# Patient Record
Sex: MALE | Race: WHITE | Employment: OTHER | URBAN - METROPOLITAN AREA
[De-identification: names, ages, dates, MRNs, and addresses within clinical notes are randomized per-mention and may not be internally consistent; named-entity substitution may affect disease eponyms.]

---

## 2017-02-20 PROBLEM — K64.0 FIRST DEGREE HEMORRHOIDS: Status: ACTIVE | Noted: 2017-02-20

## 2018-02-26 PROBLEM — F32.A MILD DEPRESSION: Status: ACTIVE | Noted: 2018-02-26

## 2018-03-12 PROBLEM — I48.0 PAROXYSMAL ATRIAL FIBRILLATION (HCC): Status: ACTIVE | Noted: 2018-03-12

## 2018-03-14 ENCOUNTER — HOSPITAL ENCOUNTER (OUTPATIENT)
Dept: LAB | Age: 58
Discharge: HOME | End: 2018-03-14
Payer: COMMERCIAL

## 2018-03-14 DIAGNOSIS — I48.0 PAROXYSMAL ATRIAL FIBRILLATION (HCC): ICD-10-CM

## 2018-03-14 LAB
ANION GAP SERPL CALC-SCNC: 8 MMOL/L
BASOPHILS # BLD: 0.1 K/UL (ref 0–0.2)
BASOPHILS NFR BLD: 1 % (ref 0–2)
BUN SERPL-MCNC: 21 MG/DL (ref 6–23)
CALCIUM SERPL-MCNC: 9.1 MG/DL (ref 8.3–10.4)
CHLORIDE SERPL-SCNC: 111 MMOL/L (ref 98–107)
CO2 SERPL-SCNC: 22 MMOL/L (ref 21–32)
CREAT SERPL-MCNC: 0.8 MG/DL (ref 0.8–1.5)
DIFFERENTIAL METHOD BLD: ABNORMAL
EOSINOPHIL # BLD: 0.1 K/UL (ref 0–0.8)
EOSINOPHIL NFR BLD: 1 % (ref 0.5–7.8)
ERYTHROCYTE [DISTWIDTH] IN BLOOD BY AUTOMATED COUNT: 12.4 % (ref 11.9–14.6)
GLUCOSE SERPL-MCNC: 95 MG/DL (ref 65–100)
HCT VFR BLD AUTO: 45.7 % (ref 41.1–50.3)
HGB BLD-MCNC: 16.7 G/DL (ref 13.6–17.2)
INR PPP: 1.1
LYMPHOCYTES # BLD: 1.6 K/UL (ref 0.5–4.6)
LYMPHOCYTES NFR BLD: 24 % (ref 13–44)
MCH RBC QN AUTO: 30.8 PG (ref 26.1–32.9)
MCHC RBC AUTO-ENTMCNC: 36.5 G/DL (ref 31.4–35)
MCV RBC AUTO: 84.2 FL (ref 79.6–97.8)
MONOCYTES # BLD: 0.5 K/UL (ref 0.1–1.3)
MONOCYTES NFR BLD: 7 % (ref 4–12)
NEUTS SEG # BLD: 4.5 K/UL (ref 1.7–8.2)
NEUTS SEG NFR BLD: 67 % (ref 43–78)
PLATELET # BLD AUTO: 207 K/UL (ref 150–450)
PMV BLD AUTO: 9.2 FL (ref 10.8–14.1)
POTASSIUM SERPL-SCNC: 3.7 MMOL/L (ref 3.5–5.1)
PROTHROMBIN TIME: 14.3 SEC (ref 11.5–14.5)
RBC # BLD AUTO: 5.43 M/UL (ref 4.23–5.67)
SODIUM SERPL-SCNC: 141 MMOL/L (ref 136–145)
WBC # BLD AUTO: 6.7 K/UL (ref 4.3–11.1)

## 2018-03-14 PROCEDURE — 36415 COLL VENOUS BLD VENIPUNCTURE: CPT | Performed by: INTERNAL MEDICINE

## 2018-03-14 PROCEDURE — 85610 PROTHROMBIN TIME: CPT | Performed by: INTERNAL MEDICINE

## 2018-03-14 PROCEDURE — 85025 COMPLETE CBC W/AUTO DIFF WBC: CPT | Performed by: INTERNAL MEDICINE

## 2018-03-14 PROCEDURE — 80048 BASIC METABOLIC PNL TOTAL CA: CPT | Performed by: INTERNAL MEDICINE

## 2018-03-15 ENCOUNTER — HOSPITAL ENCOUNTER (OUTPATIENT)
Dept: CARDIAC CATH/INVASIVE PROCEDURES | Age: 58
Discharge: HOME | End: 2018-03-15
Attending: INTERNAL MEDICINE | Admitting: INTERNAL MEDICINE
Payer: COMMERCIAL

## 2018-03-15 VITALS
BODY MASS INDEX: 24.59 KG/M2 | HEART RATE: 62 BPM | HEIGHT: 69 IN | DIASTOLIC BLOOD PRESSURE: 56 MMHG | WEIGHT: 166 LBS | TEMPERATURE: 98 F | SYSTOLIC BLOOD PRESSURE: 97 MMHG | OXYGEN SATURATION: 97 % | RESPIRATION RATE: 16 BRPM

## 2018-03-15 LAB
ATRIAL RATE: 66 BPM
CALCULATED P AXIS, ECG09: 7 DEGREES
CALCULATED R AXIS, ECG10: 63 DEGREES
CALCULATED T AXIS, ECG11: 45 DEGREES
DIAGNOSIS, 93000: NORMAL
P-R INTERVAL, ECG05: 190 MS
Q-T INTERVAL, ECG07: 398 MS
QRS DURATION, ECG06: 100 MS
QTC CALCULATION (BEZET), ECG08: 417 MS
VENTRICULAR RATE, ECG03: 66 BPM

## 2018-03-15 PROCEDURE — 74011000250 HC RX REV CODE- 250: Performed by: INTERNAL MEDICINE

## 2018-03-15 PROCEDURE — 93005 ELECTROCARDIOGRAM TRACING: CPT | Performed by: INTERNAL MEDICINE

## 2018-03-15 PROCEDURE — 74011250636 HC RX REV CODE- 250/636: Performed by: INTERNAL MEDICINE

## 2018-03-15 PROCEDURE — 74011250636 HC RX REV CODE- 250/636

## 2018-03-15 PROCEDURE — C1894 INTRO/SHEATH, NON-LASER: HCPCS

## 2018-03-15 PROCEDURE — 99152 MOD SED SAME PHYS/QHP 5/>YRS: CPT

## 2018-03-15 PROCEDURE — 99152 MOD SED SAME PHYS/QHP 5/>YRS: CPT | Performed by: NURSE PRACTITIONER

## 2018-03-15 PROCEDURE — 77030019569 HC BND COMPR RAD TERU -B

## 2018-03-15 PROCEDURE — 93458 L HRT ARTERY/VENTRICLE ANGIO: CPT

## 2018-03-15 PROCEDURE — 74011636320 HC RX REV CODE- 636/320: Performed by: INTERNAL MEDICINE

## 2018-03-15 PROCEDURE — C1769 GUIDE WIRE: HCPCS

## 2018-03-15 PROCEDURE — 77030004534 HC CATH ANGI DX INFN CARD -A

## 2018-03-15 PROCEDURE — 77030015766

## 2018-03-15 PROCEDURE — 93312 ECHO TRANSESOPHAGEAL: CPT

## 2018-03-15 PROCEDURE — 74011250637 HC RX REV CODE- 250/637: Performed by: INTERNAL MEDICINE

## 2018-03-15 RX ORDER — HYDROCODONE BITARTRATE AND ACETAMINOPHEN 5; 325 MG/1; MG/1
1 TABLET ORAL
Status: DISCONTINUED | OUTPATIENT
Start: 2018-03-15 | End: 2018-03-15 | Stop reason: HOSPADM

## 2018-03-15 RX ORDER — LIDOCAINE HYDROCHLORIDE 20 MG/ML
15 SOLUTION OROPHARYNGEAL AS NEEDED
Status: DISCONTINUED | OUTPATIENT
Start: 2018-03-15 | End: 2018-03-15 | Stop reason: HOSPADM

## 2018-03-15 RX ORDER — SODIUM CHLORIDE 9 MG/ML
75 INJECTION, SOLUTION INTRAVENOUS CONTINUOUS
Status: DISCONTINUED | OUTPATIENT
Start: 2018-03-15 | End: 2018-03-15 | Stop reason: HOSPADM

## 2018-03-15 RX ORDER — HEPARIN SODIUM 200 [USP'U]/100ML
3 INJECTION, SOLUTION INTRAVENOUS CONTINUOUS
Status: DISCONTINUED | OUTPATIENT
Start: 2018-03-15 | End: 2018-03-15 | Stop reason: HOSPADM

## 2018-03-15 RX ORDER — LIDOCAINE HYDROCHLORIDE 20 MG/ML
60 INJECTION, SOLUTION INFILTRATION; PERINEURAL ONCE
Status: COMPLETED | OUTPATIENT
Start: 2018-03-15 | End: 2018-03-15

## 2018-03-15 RX ORDER — DIAZEPAM 5 MG/1
5 TABLET ORAL ONCE
Status: DISCONTINUED | OUTPATIENT
Start: 2018-03-15 | End: 2018-03-15 | Stop reason: HOSPADM

## 2018-03-15 RX ORDER — GUAIFENESIN 100 MG/5ML
81-324 LIQUID (ML) ORAL ONCE
Status: COMPLETED | OUTPATIENT
Start: 2018-03-15 | End: 2018-03-15

## 2018-03-15 RX ORDER — SODIUM CHLORIDE 0.9 % (FLUSH) 0.9 %
5-10 SYRINGE (ML) INJECTION EVERY 8 HOURS
Status: DISCONTINUED | OUTPATIENT
Start: 2018-03-15 | End: 2018-03-15 | Stop reason: HOSPADM

## 2018-03-15 RX ORDER — ACETAMINOPHEN 325 MG/1
650 TABLET ORAL
Status: DISCONTINUED | OUTPATIENT
Start: 2018-03-15 | End: 2018-03-15 | Stop reason: HOSPADM

## 2018-03-15 RX ORDER — SODIUM CHLORIDE 0.9 % (FLUSH) 0.9 %
5-10 SYRINGE (ML) INJECTION AS NEEDED
Status: DISCONTINUED | OUTPATIENT
Start: 2018-03-15 | End: 2018-03-15 | Stop reason: HOSPADM

## 2018-03-15 RX ORDER — ONDANSETRON 2 MG/ML
4 INJECTION INTRAMUSCULAR; INTRAVENOUS
Status: DISCONTINUED | OUTPATIENT
Start: 2018-03-15 | End: 2018-03-15 | Stop reason: HOSPADM

## 2018-03-15 RX ORDER — FENTANYL CITRATE 50 UG/ML
25-50 INJECTION, SOLUTION INTRAMUSCULAR; INTRAVENOUS
Status: DISCONTINUED | OUTPATIENT
Start: 2018-03-15 | End: 2018-03-15 | Stop reason: HOSPADM

## 2018-03-15 RX ORDER — MIDAZOLAM HYDROCHLORIDE 1 MG/ML
.5-2 INJECTION, SOLUTION INTRAMUSCULAR; INTRAVENOUS
Status: DISCONTINUED | OUTPATIENT
Start: 2018-03-15 | End: 2018-03-15 | Stop reason: HOSPADM

## 2018-03-15 RX ADMIN — MIDAZOLAM HYDROCHLORIDE 2 MG: 1 INJECTION, SOLUTION INTRAMUSCULAR; INTRAVENOUS at 14:04

## 2018-03-15 RX ADMIN — FENTANYL CITRATE 50 MCG: 50 INJECTION, SOLUTION INTRAMUSCULAR; INTRAVENOUS at 14:00

## 2018-03-15 RX ADMIN — MIDAZOLAM HYDROCHLORIDE 2 MG: 1 INJECTION, SOLUTION INTRAMUSCULAR; INTRAVENOUS at 14:00

## 2018-03-15 RX ADMIN — HEPARIN SODIUM 3 ML/HR: 200 INJECTION, SOLUTION INTRAVENOUS at 14:06

## 2018-03-15 RX ADMIN — LIDOCAINE HYDROCHLORIDE 60 MG: 20 INJECTION, SOLUTION INFILTRATION; PERINEURAL at 14:06

## 2018-03-15 RX ADMIN — LIDOCAINE HYDROCHLORIDE 15 ML: 20 SOLUTION ORAL; TOPICAL at 09:00

## 2018-03-15 RX ADMIN — ASPIRIN 81 MG 81 MG: 81 TABLET ORAL at 10:00

## 2018-03-15 RX ADMIN — IOPAMIDOL 74 ML: 755 INJECTION, SOLUTION INTRAVENOUS at 14:16

## 2018-03-15 RX ADMIN — HEPARIN SODIUM 2 ML: 10000 INJECTION, SOLUTION INTRAVENOUS; SUBCUTANEOUS at 14:07

## 2018-03-15 NOTE — PROCEDURES
Brief Cardiac Procedure Note    Patient: Terese Sweet MRN: [id number]  SSN: [id number]    YOB: 1960  Age: 65 y.o. Sex: male      Date of Procedure: 3/15/2018     Pre-procedure Diagnosis: Mitral Valve Disorder    Post-procedure Diagnosis: Mitral Valve Disorder    Procedure: Left Heart Catheterization    Brief Description of Procedure: As above    Performed By: Tula Jeans, MD     Assistants: None    Anesthesia: Moderate Sedation    Estimated Blood Loss: Less than 10 mL      Specimens: None    Implants: None    Findings: Mild to moderate CAD. EF 50%. 4+ MR    Complications: None    Recommendations: Continue medical therapy.     Signed By: Tula Jeans, MD     March 15, 2018

## 2018-03-15 NOTE — IP AVS SNAPSHOT
[address] 
[phone] Patient: Efrain Salvador 
MRN: [id number] RVI:5/4/8800 Discharge Summary 3/15/2018 Melissa Herrera III  
 MRN[de-identified]  [id number] Admission Information Provider Pager Service Admission Date Expected D/C Date Caio Becerril MD  CARDIAC CATH LAB 3/15/2018 Actual LOS Patient Class 0 days OUTPATIENT Follow-up Information Follow up With Details Comments Contact Info Vandana Kwan MD On 4/3/2018 Appointment with Dr Srikanth Day with CV surgery on Tuesday April 3rd at [address] [facility] CARDIO THORACIC [facility] [address] 
[phone] Caio Becerril MD On 4/12/2018 Follow up with Dr. Weston Haile on April 12th at 915am at [facility] Cardiology. [address] [facility] [address] 
[phone] My Medications ASK your physician about these medications Instructions Each Dose to Equal  
 Morning Noon Evening Bedtime  
 apixaban 5 mg tablet Commonly known as:  Kandi Redfield Your last dose was: Your next dose is: Take 1 Tab by mouth two (2) times a day. 5 mg  
    
   
   
   
  
 dronedarone Tab tablet Commonly known as:  Toyei Bettye Your last dose was: Your next dose is: Take 1 Tab by mouth two (2) times daily (with meals). 400 mg  
    
   
   
   
  
 hydrocortisone 2.5 % rectal cream  
Commonly known as:  ANUSOL-HC Your last dose was: Your next dose is: Insert  into rectum two (2) times a day. LORazepam 1 mg tablet Commonly known as:  ATIVAN Your last dose was: Your next dose is: Take 1 Tab by mouth two (2) times a day. Max Daily Amount: 2 mg.  
 1 mg  
    
   
   
   
  
 metoprolol succinate 50 mg XL tablet Commonly known as:  TOPROL-XL  
 Your last dose was: Your next dose is: Take 1 Tab by mouth daily. 50 mg  
    
   
   
   
  
 sertraline 100 mg tablet Commonly known as:  ZOLOFT Your last dose was: Your next dose is: Take 1 Tab by mouth daily. 100 mg  
    
   
   
   
  
 zolpidem 10 mg tablet Commonly known as:  AMBIEN Your last dose was: Your next dose is: Take 1 Tab by mouth nightly. Max Daily Amount: 10 mg.  
 10 mg  
    
   
   
   
  
  
  
  
 
  
General Information Please provide this summary of care documentation to your next provider. Allergies No Known Allergies Current Immunizations  Reviewed on 2/22/2016 Name Date Influenza Vaccine (Quad) Mdck Pf 8/29/2017 Influenza Vaccine (Quad) PF 10/24/2016 Td 4/22/2013 Discharge Instructions Discharge Instructions HEART CATHETERIZATION/ANGIOGRAPHY DISCHARGE INSTRUCTIONS 1. Check puncture site frequently for swelling or bleeding. If there is any bleeding, lie down and apply pressure over the area with a clean towel or washcloth and call 911. Notify your doctor for any redness, swelling, drainage, or oozing from the puncture site. Notify your doctor for any fever or chills. 2. If the extremity becomes cold, numb, or painful call [facility] Cardiology at [phone]. 
3. Activity should be limited for the next 48 hours. No heavy lifting (anything over 5 pounds) for 3 days. 4. You may resume your usual diet. Drink more fluids than usual. 
5. Have a responsible person drive you home and stay with you for at least 24 hours after your heart catheterization/angiography. 6. You may remove bandage from your Right wrist in 24 hours. You may shower in 24 hours. No tub baths, hot tubs, or swimming for 1 week.  Do not place any lotions, creams, powders, or ointments over puncture site for 1 week. You may place a clean band-aid over the puncture site each day for 5 days. Change daily. I have read the above instructions and have had the opportunity to ask questions. Patient: ________________________   Date: 3/15/2018 Witness: _______________________   Date: 3/15/2018 Discharge Orders None  
  
` Patient Signature:  ____________________________________________________________ Date:  ____________________________________________________________  
  
 Marlyse Leaks Provider Signature:  ____________________________________________________________ Date:  ____________________________________________________________

## 2018-03-15 NOTE — H&P
[address], [address] [facility], [address] [place] [address]  PHONE: [phone]  Yesika Wick III  1960     SUBJECTIVE:   Yesika Wick III is a 65 y.o. male seen for a follow up visit regarding the following:           Chief Complaint   Patient presents with    Other       Aortic Valve Disease         HPI:     HPI Comments: Echo showed severe MR due to prolapse and possible flail posterior leaflet, low normal LVEF with mild LVE. There was no significant mitral valve calcification and all other valves appeared normal. RVSP 41. Monitor has shown a host of atrial arrhythmias which include atypical flutter, atrial fib and atrial tachycardia. His overarching symptom is palpitations leading to anxiety. He says if it weren't for that, he would feel fine, denies dyspnea or chest discomfort. Notably, with heart rate 140 ST segments and T waves remain normal.  He frequently converts spontaneously to sinus rhythm and does so during his exam today.          Past Medical History, Past Surgical History, Family history, Social History, and Medications were all reviewed with the patient today and updated as necessary.              Prior to Admission medications    Medication Sig Start Date End Date Taking? Authorizing Provider   metoprolol succinate (TOPROL-XL) 50 mg XL tablet Take 1 Tab by mouth daily. 3/14/18   Yes Kevin Jones MD   apixaban (ELIQUIS) 5 mg tablet Take 1 Tab by mouth two (2) times a day. 3/14/18   Yes Kevin Jones MD   dronedarone (MULTAQ) tab tablet Take 1 Tab by mouth two (2) times daily (with meals). 3/14/18   Yes Kevin Jones MD   LORazepam (ATIVAN) 1 mg tablet Take 1 Tab by mouth two (2) times a day. Max Daily Amount: 2 mg. 2/12/18     Leonardo Velez MD   zolpidem (AMBIEN) 10 mg tablet Take 1 Tab by mouth nightly. Max Daily Amount: 10 mg. 12/18/17     Leonardo Velez MD   sertraline (ZOLOFT) 100 mg tablet Take 1 Tab by mouth daily.  11/4/17     Hasmukh Calabrese MD Marty   hydrocortisone (ANUSOL-HC) 2.5 % rectal cream Insert  into rectum two (2) times a day. 3/3/17     Galen Gandhi MD      No Known Allergies       Past Medical History:   Diagnosis Date    Allergic rhinitis due to other allergen 2/25/2015    Anxiety      Anxiety state, unspecified 2/25/2015    Depression      First degree hemorrhoids 2/20/2017    Insomnia 2/25/2015    Insomnia, unspecified 2/25/2015    Lipoma 2/25/2015    Unspecified adjustment reaction 2/25/2015            Past Surgical History:   Procedure Laterality Date    HX WISDOM TEETH EXTRACTION                 Family History   Problem Relation Age of Onset    Other Mother         alcoholism    Hypertension Father      Heart Failure Paternal Grandfather      Coronary Artery Disease Paternal Grandfather             Social History   Substance Use Topics    Smoking status: Never Smoker    Smokeless tobacco: Never Used    Alcohol use No         ROS:     Review of Systems   Cardiovascular: Positive for palpitations. Negative for chest pain. Respiratory: Negative for shortness of breath. Psychiatric/Behavioral: The patient is nervous/anxious.              PHYSICAL EXAM:          Visit Vitals    /78    Pulse (!) 113    Ht 5' 9\" (1.753 m)    Wt 166 lb (75.3 kg)    BMI 24.51 kg/m2                 Wt Readings from Last 3 Encounters:   03/14/18 166 lb (75.3 kg)   03/09/18 164 lb (74.4 kg)   02/27/18 163 lb 9.6 oz (74.2 kg)          BP Readings from Last 3 Encounters:   03/14/18 124/78   03/09/18 126/64   02/27/18 126/70            Physical Exam   Constitutional: He is oriented to person, place, and time. He appears well-developed and well-nourished. HENT:   Head: Normocephalic and atraumatic. Eyes: Conjunctivae are normal.   Neck: Neck supple. No JVD present. Cardiovascular: Normal rate and regular rhythm. Exam reveals no gallop and no friction rub.     Murmur (3/6 hsm llsb, apex and precordium) heard.  Pulmonary/Chest: Breath sounds normal. No respiratory distress. He has no wheezes. He has no rales. Musculoskeletal: He exhibits no edema. Neurological: He is alert and oriented to person, place, and time. Skin: Skin is warm and dry. Psychiatric: He has a normal mood and affect. Vitals reviewed.        Medical problems and test results were reviewed with the patient today.               Lab Results   Component Value Date/Time     BUN 21 03/14/2018 11:11 AM      Lab Results   Component Value Date/Time     Creatinine 0.80 03/14/2018 11:11 AM            Lab Results   Component Value Date/Time     Potassium 3.7 03/14/2018 11:11 AM                  Lab Results   Component Value Date/Time     Cholesterol, total 162 02/20/2017 04:03 PM     HDL Cholesterol 54 02/20/2017 04:03 PM     LDL, calculated 96 02/20/2017 04:03 PM     VLDL, calculated 12 02/20/2017 04:03 PM     Triglyceride 61 02/20/2017 04:03 PM      EKG - Atrial fibrillation  with RVR  normal axis, intervals, ST segments, T waves     ASSESSMENT and PLAN     Diagnoses and all orders for this visit:     1. Paroxysmal atrial fibrillation (HCC)  -     EKG  -     ECHO TRANSESOPHAGEAL (MALLORY) W OR WO CONTR; Future  -     LEFT HEART CATH; Future  -     CBC WITH AUTOMATED DIFF; Future  -     METABOLIC PANEL, BASIC; Future  -     PROTHROMBIN TIME + INR; Future  -     CARDIOVERSION EXTERNAL; Future     2. Mitral regurgitation, myxomatous  -     CARDIOTHORACIC SURGERY - [facility]     Other orders  -     metoprolol succinate (TOPROL-XL) 50 mg XL tablet; Take 1 Tab by mouth daily. -     apixaban (ELIQUIS) 5 mg tablet; Take 1 Tab by mouth two (2) times a day. -     dronedarone (MULTAQ) tab tablet; Take 1 Tab by mouth two (2) times daily (with meals).                IMPRESSION:      Should be a good candidate for MV repair. Likelihood of coexistent coronary disease is low but needs angiogram and MALLORY prior to surgery.   He would like to proceed with each of those but delay surgery for 3-6 weeks while getting things in order. Discussed risks of procedure including, but not limited to, bleeding, infection, MI, CVA, renal failure, allergic reaction, arterial damage. Patient understands and wishes to proceed. Discussed risks of procedure including, but not limited to esophageal damage, allergic reaction, bleeding. Patient understands and wishes to proceed.      His work is very stressful and contributes to his tachyarrhythmias. I encouraged a leave of absence while having this evaluated and treated, he will consider     Because he has again spontaneously converted will add Multaq 400 mg bid, add     Discussed indications, benefits and risks of anticoagulation with patient. Risks include, but are not limited to minor and major bleeding for which there may not be an immediate antidote other than supportive care. The risk of fatal bleeding is rare but present. Patient is advised to stop all aspirin and aspirin containing products and NSAID's (such as ibuprofen, Aleve, Naprosyn), and is invited to call should he or she have questions regarding the safety of over the counter medications, and instructed to call should anyone wish to interrupt the medication for any reason. A mild increase in  bruising is to be expected. Call should concerning signs or symptoms of abnormal bleeding be experienced. moderate dose metoprolol for rate control, and Eliquis for anticoagulation at this time, though his CHADS 2 VAsc score is 0 at this time. If still in afib during MALLORY will proceed with cardioversion which we discussed at length as well, reviewed risks and benefits.   CT surgery consultation after diagnostic evaluation complete.            Follow-up Disposition:  Return for after procedure to review results.

## 2018-03-15 NOTE — PROCEDURES
[address]  MR#: [id number]  : 1960  ACCOUNT #: [de-identified]   DATE OF SERVICE: 03/15/2018    PROCEDURE:  Left heart catheterization, selective arteriography, left ventriculogram via the right radial approach. INDICATION:  Severe mitral regurgitation. Need for mitral valve repair. TECHNICAL FINDINGS:  After informed consent was obtained, patient brought to cardiac catheterization lab. The right radial artery was prepped and draped in the usual sterile fashion. Using the modified Seldinger technique and micropuncture needle, the right radial artery was entered. A 6-Citizen of [place] slender sheath was placed without difficulty. Radial cocktail consisting of 2000 units of heparin, 2 mg verapamil, 200 mcg nitroglycerin was administered. A 5-Citizen of [place] Tiger 4.0 catheter used to engage the ostium of the left main coronary artery and right coronary artery respectively. Selective injection of various projections was performed. The pigtail catheter used to cross the aortic valve and left ventricle. Hemodynamic measurements and left ventriculogram were obtained. Left ventricular aortic pressure estimated by pullback technique. At the close of diagnostic procedure, the radial sheath was removed and a pneumatic band was placed with excellent hemostasis. No complications were encountered. SEDATION:  The patient received 4 mg of Versed, 25 mcg fentanyl. Sedation start time was 1402 with a procedure end time of 1421. Sedation was administered by Mirna Alan RN, under my supervision. CONTRAST:  Isovue 74. HEMODYNAMIC RESULTS:  1. Aortic pressure 116/64 with a mean of 78.  2.  Left ventricular end diastolic pressure is 26.    3.  There is no significant gradient across the aortic valve. Study start time was 14, O2 with the procedure end time 14:21.   Sedation was administered by Galdino Cortes RN under my supervision. CORONARY ANGIOGRAPHY:    1. Left main coronary artery:  Large caliber vessel. A 10% to 20% distal left main coronary artery stenosis with mild calcification. 2.  Left anterior descending artery: This is a medium caliber vessel. Diffuse 20% mid stenosis. 3.  First diagonal artery:  Medium caliber vessel. Patent. 4.  Ramus intermediate:  Medium to large caliber vessel. Angiographically normal.  5.  Left circumflex:  Medium caliber vessel. Contains a 40% mid stenosis. 6.  Right coronary artery:  Large caliber dominant vessel; 30% ostial stenosis; 20% mid stenosis. 7.  Right PDA:  Small caliber vessel. 8.  Right posterolateral branch:  Medium caliber vessel. Patent. 9.  Left ventricle: Moderately dilated. Low normal left ventricular systolic function, EF 88%. There is 4+ mitral regurgitation with a markedly enlarged left atrium and retrograde filling of the pulmonary veins. CONCLUSION:    1. Mild to moderate nonobstructive coronary artery disease. 2.  Dilated left ventricle with low normal left ventricular systolic function. 3.  Mitral regurgitation, 4+. PLAN:  Surgical evaluation for mitral valve repair, surgical Maze and left atrial appendage occlusion. I do not see targets for coronary bypass grafting.       MD PAPI Baig / RN  D: 03/15/2018 14:34     T: 03/15/2018 15:01  JOB #: [id number]

## 2018-03-15 NOTE — PROCEDURES
Procedure: MALLORY with color doppler and 3-D reconstruction    Assistants:  PORTIA Villasenor sonographer    Sedation start time: 10:25 am  Sedation completion time: 10:44 am    After informed consent was obtained, posterior pharynx was anesthetized with topical xylocaine. Patient was sedated with 75 mcg IV fentanyl and 4 IV Versed. MALLORY probe was inserted into the distal esophagus without difficulty. Imaging of the heart and great vessels obtained. Procedure was well tolerated without apparent complications. FINDINGS: full report is under separate cover  LV: Mild LV enlargement with low normal LVEF    RV:normal size and function    ATRIA: marked left atrial enlargement, normal right atrium with small Eustachian valve and intact interatrial septum    VALVES: normal structure and function of AV, PV and TV. Flail posterior MV leaflet with ruptured chord and severe eccentric MR. No significant MV calcification    AORTA: normal caliber, minimal atheroma      IMPRESSION:  1. Low normal LV systolic function with mild LVE  2. Severe MR due primarily to ruptured chord with flail posterior leaflet        PLAN:    Surgical consultation is scheduled for 4/3/18. Patient has severe anxiety and hopes to move up appointment, but in the interim will need to apply for leave from work as his workplace environment is very stressful and exacerbates his symptoms significantly.

## 2018-03-15 NOTE — DISCHARGE INSTRUCTIONS
HEART CATHETERIZATION/ANGIOGRAPHY DISCHARGE INSTRUCTIONS    1. Check puncture site frequently for swelling or bleeding. If there is any bleeding, lie down and apply pressure over the area with a clean towel or washcloth and call 911. Notify your doctor for any redness, swelling, drainage, or oozing from the puncture site. Notify your doctor for any fever or chills. 2. If the extremity becomes cold, numb, or painful call [facility] Cardiology at [phone].  3. Activity should be limited for the next 48 hours. No heavy lifting (anything over 5 pounds) for 3 days. 4. You may resume your usual diet. Drink more fluids than usual.  5. Have a responsible person drive you home and stay with you for at least 24 hours after your heart catheterization/angiography. 6. You may remove bandage from your Right wrist in 24 hours. You may shower in 24 hours. No tub baths, hot tubs, or swimming for 1 week. Do not place any lotions, creams, powders, or ointments over puncture site for 1 week. You may place a clean band-aid over the puncture site each day for 5 days. Change daily. I have read the above instructions and have had the opportunity to ask questions.       Patient: ________________________   Date: 3/15/2018    Witness: _______________________   Date: 3/15/2018

## 2018-03-15 NOTE — PROGRESS NOTES
TRANSFER - OUT REPORT:    Verbal report given to Hal Morfin RN(name) on [employer] III  being transferred to CPRU(unit) for routine progression of care       Report consisted of patients Situation, Background, Assessment and   Recommendations(SBAR). Information from the following report(s) SBAR and Procedure Summary was reviewed with the receiving nurse. Lines:   Peripheral IV 03/15/18 Right Antecubital (Active)       Peripheral IV 03/15/18 Left Hand (Active)        Opportunity for questions and clarification was provided.       Patient transported with:   Monitor

## 2018-03-15 NOTE — PROGRESS NOTES
Patient up to bedside, vital signs and site stable. Patient ambulated to bathroom without difficulty. Patient voided without difficulty. Vascular site stable. 1547 Discharge instructions and home medications reviewed with patient. Time allowed for questions and answers. [id number] Patient ambulated second time without difficulty. Site stable after ambulation. Peripheral IV sites dc'd without difficulty with tips intact. 1615 Patient discharged to home with family. Patient up to bedside, vital signs and site stable. Patient ambulated to bathroom without difficulty. Patient voided without difficulty. Vascular site stable.

## 2018-03-15 NOTE — PROGRESS NOTES
TRANSFER - IN REPORT:    Verbal report received from Lori Coleman RN(name) on [employer] III  being received from cath lab(unit) for routine progression of care      Report consisted of patients Situation, Background, Assessment and   Recommendations(SBAR). Information from the following report(s) Procedure Summary was reviewed with the receiving nurse. Opportunity for questions and clarification was provided. Assessment completed upon patients arrival to unit and care assumed.

## 2018-03-15 NOTE — PROGRESS NOTES
Patient received to [address] [place] [address] room # 9  Ambulatory from [facility]. Patient scheduled for MALLORY/LHC today with Dr Bhavani Rajan. Procedure reviewed & questions answered, voiced good understanding consent obtained & placed on chart. All medications and medical history reviewed. Will prep patient per orders. Patient & family updated on plan of care.

## 2018-03-15 NOTE — PROGRESS NOTES
TRANSFER - OUT REPORT:    Verbal report given to Celeste(name) on Rachid Ferrara III  being transferred to cpru(unit) for routine progression of care       Report consisted of patients Situation, Background, Assessment and   Recommendations(SBAR). Information from the following report(s) SBAR was reviewed with the receiving nurse. Opportunity for questions and clarification was provided. Procedure: [facility]   Finding Summary: no interventions(cath/pci/pacer settings)  Location: rwrist    Closure Device: trband 10ml(yes/no/description)  Post Site Assessment: no bleeding/no hematoma         Intra Procedure Meds:    Versed: 4mg  Fentanyl: 50mcg               Peripheral IV 03/15/18 Right Antecubital (Active)       Peripheral IV 03/15/18 Left Hand (Active)        Post-Procedure Site Assessment (1)  Wound Type: Catheter entry/exit  Location: Wrist  Orientation : Right  Hemostasis : TR Band  Site Assessment: No bleeding, No hematoma                       has No Known Allergies.     Past Medical History:   Diagnosis Date    Allergic rhinitis due to other allergen 2/25/2015    Anxiety     Anxiety state, unspecified 2/25/2015    Depression     First degree hemorrhoids 2/20/2017    Insomnia 2/25/2015    Insomnia, unspecified 2/25/2015    Lipoma 2/25/2015    Unspecified adjustment reaction 2/25/2015     Visit Vitals    /65 (BP Patient Position: Supine)    Pulse 66    Temp 98 °F (36.7 °C)    Resp 14    Ht 5' 9\" (1.753 m)    Wt 75.3 kg (166 lb)    SpO2 97%    BMI 24.51 kg/m2

## 2018-04-03 PROBLEM — Z79.01 ANTICOAGULATED: Chronic | Status: ACTIVE | Noted: 2018-04-03

## 2018-04-17 ENCOUNTER — HOSPITAL ENCOUNTER (OUTPATIENT)
Dept: ULTRASOUND IMAGING | Age: 58
Discharge: HOME | DRG: 219 | End: 2018-04-17
Attending: THORACIC SURGERY (CARDIOTHORACIC VASCULAR SURGERY)
Payer: COMMERCIAL

## 2018-04-17 ENCOUNTER — HOSPITAL ENCOUNTER (OUTPATIENT)
Dept: GENERAL RADIOLOGY | Age: 58
Discharge: HOME | DRG: 219 | End: 2018-04-17
Attending: THORACIC SURGERY (CARDIOTHORACIC VASCULAR SURGERY)
Payer: COMMERCIAL

## 2018-04-17 ENCOUNTER — HOSPITAL ENCOUNTER (OUTPATIENT)
Dept: SURGERY | Age: 58
Discharge: HOME | DRG: 219 | End: 2018-04-17
Payer: COMMERCIAL

## 2018-04-17 ENCOUNTER — ANESTHESIA EVENT (OUTPATIENT)
Dept: SURGERY | Age: 58
DRG: 219 | End: 2018-04-17
Payer: COMMERCIAL

## 2018-04-17 VITALS
RESPIRATION RATE: 17 BRPM | TEMPERATURE: 98.5 F | SYSTOLIC BLOOD PRESSURE: 104 MMHG | HEART RATE: 66 BPM | BODY MASS INDEX: 26.21 KG/M2 | WEIGHT: 167 LBS | HEIGHT: 67 IN | OXYGEN SATURATION: 96 % | DIASTOLIC BLOOD PRESSURE: 61 MMHG

## 2018-04-17 LAB
ALBUMIN SERPL-MCNC: 4 G/DL (ref 3.5–5)
ANION GAP SERPL CALC-SCNC: 12 MMOL/L (ref 7–16)
APPEARANCE UR: CLEAR
APTT PPP: 27.6 SEC (ref 23.2–35.3)
BACTERIA SPEC CULT: ABNORMAL
BILIRUB SERPL-MCNC: 0.5 MG/DL (ref 0.2–1.1)
BILIRUB UR QL: NEGATIVE
BUN SERPL-MCNC: 14 MG/DL (ref 6–23)
CALCIUM SERPL-MCNC: 9.1 MG/DL (ref 8.3–10.4)
CHLORIDE SERPL-SCNC: 109 MMOL/L (ref 98–107)
CO2 SERPL-SCNC: 24 MMOL/L (ref 21–32)
COLOR UR: YELLOW
CREAT SERPL-MCNC: 0.94 MG/DL (ref 0.8–1.5)
ERYTHROCYTE [DISTWIDTH] IN BLOOD BY AUTOMATED COUNT: 13 % (ref 11.9–14.6)
EST. AVERAGE GLUCOSE BLD GHB EST-MCNC: 97 MG/DL
GLUCOSE SERPL-MCNC: 88 MG/DL (ref 65–100)
GLUCOSE UR STRIP.AUTO-MCNC: NEGATIVE MG/DL
HBA1C MFR BLD: 5 % (ref 4.8–6)
HCT VFR BLD AUTO: 42.9 % (ref 41.1–50.3)
HGB BLD-MCNC: 15.3 G/DL (ref 13.6–17.2)
HGB UR QL STRIP: NEGATIVE
INR PPP: 1.1
KETONES UR QL STRIP.AUTO: NEGATIVE MG/DL
LEUKOCYTE ESTERASE UR QL STRIP.AUTO: NEGATIVE
MCH RBC QN AUTO: 30.7 PG (ref 26.1–32.9)
MCHC RBC AUTO-ENTMCNC: 35.7 G/DL (ref 31.4–35)
MCV RBC AUTO: 86.1 FL (ref 79.6–97.8)
NITRITE UR QL STRIP.AUTO: NEGATIVE
PH UR STRIP: 6.5 [PH] (ref 5–9)
PLATELET # BLD AUTO: 183 K/UL (ref 150–450)
PMV BLD AUTO: 10.6 FL (ref 10.8–14.1)
POTASSIUM SERPL-SCNC: 4.1 MMOL/L (ref 3.5–5.1)
PROT UR STRIP-MCNC: NEGATIVE MG/DL
PROTHROMBIN TIME: 13.9 SEC (ref 11.5–14.5)
RBC # BLD AUTO: 4.98 M/UL (ref 4.23–5.67)
SERVICE CMNT-IMP: ABNORMAL
SODIUM SERPL-SCNC: 145 MMOL/L (ref 136–145)
SP GR UR REFRACTOMETRY: 1.02 (ref 1–1.02)
UROBILINOGEN UR QL STRIP.AUTO: 0.2 EU/DL (ref 0.2–1)
WBC # BLD AUTO: 6.6 K/UL (ref 4.3–11.1)

## 2018-04-17 PROCEDURE — 82247 BILIRUBIN TOTAL: CPT | Performed by: THORACIC SURGERY (CARDIOTHORACIC VASCULAR SURGERY)

## 2018-04-17 PROCEDURE — 86923 COMPATIBILITY TEST ELECTRIC: CPT | Performed by: THORACIC SURGERY (CARDIOTHORACIC VASCULAR SURGERY)

## 2018-04-17 PROCEDURE — 93880 EXTRACRANIAL BILAT STUDY: CPT

## 2018-04-17 PROCEDURE — 82040 ASSAY OF SERUM ALBUMIN: CPT | Performed by: THORACIC SURGERY (CARDIOTHORACIC VASCULAR SURGERY)

## 2018-04-17 PROCEDURE — 86900 BLOOD TYPING SEROLOGIC ABO: CPT | Performed by: THORACIC SURGERY (CARDIOTHORACIC VASCULAR SURGERY)

## 2018-04-17 PROCEDURE — 80048 BASIC METABOLIC PNL TOTAL CA: CPT | Performed by: THORACIC SURGERY (CARDIOTHORACIC VASCULAR SURGERY)

## 2018-04-17 PROCEDURE — 87641 MR-STAPH DNA AMP PROBE: CPT | Performed by: THORACIC SURGERY (CARDIOTHORACIC VASCULAR SURGERY)

## 2018-04-17 PROCEDURE — 77030027138 HC INCENT SPIROMETER -A

## 2018-04-17 PROCEDURE — 85730 THROMBOPLASTIN TIME PARTIAL: CPT | Performed by: THORACIC SURGERY (CARDIOTHORACIC VASCULAR SURGERY)

## 2018-04-17 PROCEDURE — 83036 HEMOGLOBIN GLYCOSYLATED A1C: CPT | Performed by: THORACIC SURGERY (CARDIOTHORACIC VASCULAR SURGERY)

## 2018-04-17 PROCEDURE — 81003 URINALYSIS AUTO W/O SCOPE: CPT | Performed by: THORACIC SURGERY (CARDIOTHORACIC VASCULAR SURGERY)

## 2018-04-17 PROCEDURE — 85610 PROTHROMBIN TIME: CPT | Performed by: THORACIC SURGERY (CARDIOTHORACIC VASCULAR SURGERY)

## 2018-04-17 PROCEDURE — 94010 BREATHING CAPACITY TEST: CPT

## 2018-04-17 PROCEDURE — 71046 X-RAY EXAM CHEST 2 VIEWS: CPT

## 2018-04-17 PROCEDURE — 85027 COMPLETE CBC AUTOMATED: CPT | Performed by: THORACIC SURGERY (CARDIOTHORACIC VASCULAR SURGERY)

## 2018-04-17 RX ORDER — AMIODARONE HYDROCHLORIDE 200 MG/1
400 TABLET ORAL ONCE
Status: CANCELLED | OUTPATIENT
Start: 2018-04-19 | End: 2018-04-19

## 2018-04-17 NOTE — PERIOP NOTES
MRSA negative and SA is positive, patient has tiny bump in left nostril, called to Ron Lennon and Dr Jamie Nam office   All other labs noted and acceptable with anesthesia protocol. Patient notified and is to start mupirocin ointment now and continue to include am of surgery.

## 2018-04-17 NOTE — PERIOP NOTES
Patient verified name, , and surgery as listed in [facility]. Patient provided medical/health information and PTA medications to the best of their ability. TYPE  CASE: lll  Labs per surgeon: cbc, bmp, pt, ptt urinalysis and MRSA and A1C and T&C and total bili and albumin, and chest x ray and carotid ultrasound,  *  Labs per anesthesia protocol: no additional  EKG:  Ekg, echo and cath on chart and reviewed by Dr Tammy Louie , no new orders obtained    Patient provided with and instructed on educaitonal handouts including Heart Guide to Surgery, blood transfusions, pain management, central line infection prevention, being on a ventilator and hand hygiene for the family and community, and [facility] brochure. Instructed that family must be present in building at all times. Incentive spirometry completed with return demonstration. FEV1 completed as ordered. Patient viewed pre-operative DVD. Instructed on chest-xray procedure and carotid ultrasound. Instructed on type and cross match procedure and not to remove green blood bank bracelet. Instructed on medications-   Mupirocin with Rx called into  [facility] @ [phone]. Mupirocin ointment to be used the night before surgery and the am of surgery. After calling Dr Divine Guzman office, orders obtained to hold amiodarone the day before and AM of surgery because patient is on Multaq    Hibiclens and instructions given per hospital policy. Instructed patient to continue previous medications as prescribed prior to surgery unless otherwise directed and to take the following medications the day of surgery according to anesthesia guidelines : ativan, and metoprolol, and mupirocin and MULTAQ . Instructed patient to hold  the following medications: eliquis. Original medication prescription bottles were visualized during patient appointment. Patient teach back successful and patient demonstrates knowledge of instruction.

## 2018-04-17 NOTE — ANESTHESIA PREPROCEDURE EVALUATION
Anesthetic History   No history of anesthetic complications            Review of Systems / Medical History  Patient summary reviewed, nursing notes reviewed and pertinent labs reviewed    Pulmonary  Within defined limits                 Neuro/Psych         Psychiatric history     Cardiovascular      Valvular problems/murmurs: mitral insufficiency      Dysrhythmias : atrial fibrillation  CAD (mild non-obstructive dz)    Exercise tolerance: >4 METS  Comments: Echo with EF 50%, and possible flail posterior leaflet, severe eccentric MR  Occ SOB  Denies CP  Took Metoprolol today   GI/Hepatic/Renal  Within defined limits              Endo/Other  Within defined limits           Other Findings            Physical Exam    Airway  Mallampati: I  TM Distance: 4 - 6 cm  Neck ROM: normal range of motion   Mouth opening: Normal     Cardiovascular    Rhythm: regular  Rate: normal    Murmur: Grade 3, Mitral area     Dental    Dentition: Caps/crowns     Pulmonary  Breath sounds clear to auscultation               Abdominal  GI exam deferred       Other Findings            Anesthetic Plan    ASA: 4  Anesthesia type: general    Monitoring Plan: Arterial line, [place], MALLORY, BIS and CVP    Post procedure ventilation   Induction: Intravenous  Anesthetic plan and risks discussed with: Patient and Spouse

## 2018-04-18 RX ORDER — TRANEXAMIC ACID 100 MG/ML
500 INJECTION, SOLUTION INTRAVENOUS ONCE
Status: DISCONTINUED | OUTPATIENT
Start: 2018-04-19 | End: 2018-04-19 | Stop reason: HOSPADM

## 2018-04-19 ENCOUNTER — HOSPITAL ENCOUNTER (INPATIENT)
Age: 58
LOS: 5 days | Discharge: HOME HEALTH | DRG: 219 | End: 2018-04-24
Attending: THORACIC SURGERY (CARDIOTHORACIC VASCULAR SURGERY) | Admitting: THORACIC SURGERY (CARDIOTHORACIC VASCULAR SURGERY)
Payer: COMMERCIAL

## 2018-04-19 ENCOUNTER — ANESTHESIA (OUTPATIENT)
Dept: SURGERY | Age: 58
DRG: 219 | End: 2018-04-19
Payer: COMMERCIAL

## 2018-04-19 ENCOUNTER — APPOINTMENT (OUTPATIENT)
Dept: GENERAL RADIOLOGY | Age: 58
DRG: 219 | End: 2018-04-19
Attending: THORACIC SURGERY (CARDIOTHORACIC VASCULAR SURGERY)
Payer: COMMERCIAL

## 2018-04-19 DIAGNOSIS — J95.821 RESPIRATORY FAILURE, POST-OPERATIVE (HCC): ICD-10-CM

## 2018-04-19 DIAGNOSIS — I34.0 MITRAL VALVE INSUFFICIENCY, UNSPECIFIED ETIOLOGY: Chronic | ICD-10-CM

## 2018-04-19 DIAGNOSIS — R09.02 HYPOXIA: ICD-10-CM

## 2018-04-19 DIAGNOSIS — J98.11 ATELECTASIS: ICD-10-CM

## 2018-04-19 DIAGNOSIS — F41.9 ANXIETY: Chronic | ICD-10-CM

## 2018-04-19 DIAGNOSIS — Z98.890 S/P MVR (MITRAL VALVE REPAIR): ICD-10-CM

## 2018-04-19 DIAGNOSIS — I34.1 MITRAL VALVE PROLAPSE: ICD-10-CM

## 2018-04-19 PROBLEM — F32.A MILD DEPRESSION: Chronic | Status: ACTIVE | Noted: 2018-02-26

## 2018-04-19 PROBLEM — I48.0 PAROXYSMAL ATRIAL FIBRILLATION (HCC): Chronic | Status: ACTIVE | Noted: 2018-03-12

## 2018-04-19 LAB
ANION GAP SERPL CALC-SCNC: 10 MMOL/L (ref 7–16)
APTT PPP: 33.4 SEC (ref 23.2–35.3)
ARTERIAL PATENCY WRIST A: POSITIVE
ATRIAL RATE: 85 BPM
BASE DEFICIT BLD-SCNC: 1 MMOL/L
BASE DEFICIT BLD-SCNC: 1 MMOL/L
BASE DEFICIT BLD-SCNC: 2 MMOL/L
BASE DEFICIT BLDA-SCNC: 5.1 MMOL/L (ref 0–2)
BASE EXCESS BLD CALC-SCNC: 0 MMOL/L
BASE EXCESS BLD CALC-SCNC: 0 MMOL/L
BDY SITE: ABNORMAL
BUN SERPL-MCNC: 17 MG/DL (ref 6–23)
CA-I BLD-MCNC: 1.05 MMOL/L (ref 1.12–1.32)
CA-I BLD-MCNC: 1.1 MMOL/L (ref 1.12–1.32)
CA-I BLD-MCNC: 1.11 MMOL/L (ref 1.12–1.32)
CA-I BLD-MCNC: 1.12 MMOL/L (ref 1.12–1.32)
CA-I BLD-MCNC: 1.12 MMOL/L (ref 1.12–1.32)
CA-I BLD-MCNC: 1.23 MMOL/L (ref 1.12–1.32)
CA-I BLD-MCNC: 1.24 MMOL/L (ref 1.12–1.32)
CA-I BLD-MCNC: 1.39 MMOL/L (ref 1.12–1.32)
CA-I BLD-SCNC: 1.39 MMOL/L (ref 1–1.3)
CALCIUM SERPL-MCNC: 10.1 MG/DL (ref 8.3–10.4)
CALCULATED P AXIS, ECG09: 65 DEGREES
CALCULATED R AXIS, ECG10: -42 DEGREES
CALCULATED T AXIS, ECG11: 53 DEGREES
CHLORIDE BLDA-SCNC: 109 MMOL/L (ref 98–106)
CHLORIDE SERPL-SCNC: 111 MMOL/L (ref 98–107)
CO2 SERPL-SCNC: 23 MMOL/L (ref 21–32)
COHGB MFR BLD: 0.6 % (ref 0.5–1.5)
CREAT SERPL-MCNC: 1.09 MG/DL (ref 0.8–1.5)
DIAGNOSIS, 93000: NORMAL
DO-HGB BLD-MCNC: 5 % (ref 0–5)
ERYTHROCYTE [DISTWIDTH] IN BLOOD BY AUTOMATED COUNT: 13 % (ref 11.9–14.6)
FIBRINOGEN PPP-MCNC: 223 MG/DL (ref 190–501)
FIO2 ON VENT: 60 %
GLUCOSE BLD STRIP.AUTO-MCNC: 100 MG/DL (ref 65–100)
GLUCOSE BLD STRIP.AUTO-MCNC: 100 MG/DL (ref 65–100)
GLUCOSE BLD STRIP.AUTO-MCNC: 105 MG/DL (ref 65–100)
GLUCOSE BLD STRIP.AUTO-MCNC: 114 MG/DL (ref 65–100)
GLUCOSE BLD STRIP.AUTO-MCNC: 118 MG/DL (ref 65–100)
GLUCOSE BLD STRIP.AUTO-MCNC: 120 MG/DL (ref 65–100)
GLUCOSE BLD STRIP.AUTO-MCNC: 120 MG/DL (ref 65–100)
GLUCOSE BLD STRIP.AUTO-MCNC: 121 MG/DL (ref 65–100)
GLUCOSE BLD STRIP.AUTO-MCNC: 123 MG/DL (ref 65–100)
GLUCOSE BLD STRIP.AUTO-MCNC: 124 MG/DL (ref 65–100)
GLUCOSE BLD STRIP.AUTO-MCNC: 138 MG/DL (ref 65–100)
GLUCOSE BLD STRIP.AUTO-MCNC: 140 MG/DL (ref 65–100)
GLUCOSE BLD STRIP.AUTO-MCNC: 144 MG/DL (ref 65–100)
GLUCOSE BLD STRIP.AUTO-MCNC: 79 MG/DL (ref 65–100)
GLUCOSE BLD STRIP.AUTO-MCNC: 95 MG/DL (ref 65–100)
GLUCOSE SERPL-MCNC: 105 MG/DL (ref 65–100)
HCO3 BLD-SCNC: 24.1 MMOL/L (ref 22–26)
HCO3 BLD-SCNC: 24.1 MMOL/L (ref 22–26)
HCO3 BLD-SCNC: 24.2 MMOL/L (ref 22–26)
HCO3 BLD-SCNC: 24.4 MMOL/L (ref 22–26)
HCO3 BLD-SCNC: 24.5 MMOL/L (ref 22–26)
HCO3 BLD-SCNC: 25 MMOL/L (ref 22–26)
HCO3 BLD-SCNC: 25.1 MMOL/L (ref 22–26)
HCO3 BLD-SCNC: 25.2 MMOL/L (ref 22–26)
HCO3 BLDA-SCNC: 21 MMOL/L (ref 22–26)
HCT VFR BLD AUTO: 40.3 % (ref 41.1–50.3)
HCT VFR BLD AUTO: 43.1 % (ref 41.1–50.3)
HGB BLD-MCNC: 14.2 G/DL (ref 13.6–17.2)
HGB BLD-MCNC: 15.2 G/DL (ref 13.6–17.2)
HGB BLDMV-MCNC: 15.9 GM/DL (ref 11.7–15)
INR PPP: 1.5
MAGNESIUM SERPL-MCNC: 2.6 MG/DL (ref 1.8–2.4)
MAGNESIUM SERPL-MCNC: 3.8 MG/DL (ref 1.8–2.4)
MCH RBC QN AUTO: 30.4 PG (ref 26.1–32.9)
MCHC RBC AUTO-ENTMCNC: 35.3 G/DL (ref 31.4–35)
MCV RBC AUTO: 86.2 FL (ref 79.6–97.8)
METHGB MFR BLD: 0.5 % (ref 0–1.5)
OXYHGB MFR BLDA: 94 % (ref 94–97)
P-R INTERVAL, ECG05: 212 MS
PCO2 BLD: 40.2 MMHG (ref 35–45)
PCO2 BLD: 44.8 MMHG (ref 35–45)
PCO2 BLD: 45 MMHG (ref 35–45)
PCO2 BLD: 46 MMHG (ref 35–45)
PCO2 BLD: 46.2 MMHG (ref 35–45)
PCO2 BLD: 46.6 MMHG (ref 35–45)
PCO2 BLD: 46.6 MMHG (ref 35–45)
PCO2 BLD: 48.1 MMHG (ref 35–45)
PCO2 BLDA: 42 MMHG (ref 35–45)
PEEP RESPIRATORY: 8 CM[H2O]
PH BLD: 7.31 [PH] (ref 7.35–7.45)
PH BLD: 7.32 [PH] (ref 7.35–7.45)
PH BLD: 7.33 [PH] (ref 7.35–7.45)
PH BLD: 7.34 [PH] (ref 7.35–7.45)
PH BLD: 7.36 [PH] (ref 7.35–7.45)
PH BLD: 7.39 [PH] (ref 7.35–7.45)
PH BLDA: 7.31 [PH] (ref 7.35–7.45)
PLATELET # BLD AUTO: 119 K/UL (ref 150–450)
PMV BLD AUTO: 9.4 FL (ref 10.8–14.1)
PO2 BLD: 122 MMHG (ref 80–105)
PO2 BLD: 246 MMHG (ref 80–105)
PO2 BLD: 251 MMHG (ref 80–105)
PO2 BLD: 266 MMHG (ref 80–105)
PO2 BLD: 308 MMHG (ref 80–105)
PO2 BLD: 329 MMHG (ref 80–105)
PO2 BLD: 354 MMHG (ref 80–105)
PO2 BLD: 75 MMHG (ref 80–105)
PO2 BLDA: 85 MMHG (ref 80–105)
POTASSIUM BLD-SCNC: 4.1 MMOL/L (ref 3.5–5.1)
POTASSIUM BLD-SCNC: 4.4 MMOL/L (ref 3.5–5.1)
POTASSIUM BLD-SCNC: 4.5 MMOL/L (ref 3.5–5.1)
POTASSIUM BLD-SCNC: 4.7 MMOL/L (ref 3.5–5.1)
POTASSIUM BLD-SCNC: 5.3 MMOL/L (ref 3.5–5.1)
POTASSIUM BLD-SCNC: 5.8 MMOL/L (ref 3.5–5.1)
POTASSIUM BLD-SCNC: 5.9 MMOL/L (ref 3.5–5.1)
POTASSIUM BLD-SCNC: 6.1 MMOL/L (ref 3.5–5.1)
POTASSIUM BLDA-SCNC: 4.61 MMOL/L (ref 3.5–5.3)
POTASSIUM SERPL-SCNC: 4.5 MMOL/L (ref 3.5–5.1)
POTASSIUM SERPL-SCNC: 4.6 MMOL/L (ref 3.5–5.1)
PROTHROMBIN TIME: 17.3 SEC (ref 11.5–14.5)
Q-T INTERVAL, ECG07: 410 MS
QRS DURATION, ECG06: 106 MS
QTC CALCULATION (BEZET), ECG08: 487 MS
RBC # BLD AUTO: 5 M/UL (ref 4.23–5.67)
RESP RATE: 16
SAO2 % BLD: 100 % (ref 95–98)
SAO2 % BLD: 93 % (ref 95–98)
SAO2 % BLD: 95 % (ref 92–98.5)
SAO2 % BLD: 99 % (ref 95–98)
SERVICE CMNT-IMP: ABNORMAL
SODIUM BLD-SCNC: 133 MMOL/L (ref 136–145)
SODIUM BLD-SCNC: 135 MMOL/L (ref 136–145)
SODIUM BLD-SCNC: 135 MMOL/L (ref 136–145)
SODIUM BLD-SCNC: 137 MMOL/L (ref 136–145)
SODIUM BLD-SCNC: 137 MMOL/L (ref 136–145)
SODIUM BLD-SCNC: 139 MMOL/L (ref 136–145)
SODIUM BLD-SCNC: 141 MMOL/L (ref 136–145)
SODIUM BLD-SCNC: 141 MMOL/L (ref 136–145)
SODIUM BLDA-SCNC: 138.5 MMOL/L (ref 135–148)
SODIUM SERPL-SCNC: 144 MMOL/L (ref 136–145)
VENTILATION MODE VENT: ABNORMAL
VENTRICULAR RATE, ECG03: 85 BPM
VT SETTING VENT: 450 ML
WBC # BLD AUTO: 14.8 K/UL (ref 4.3–11.1)

## 2018-04-19 PROCEDURE — 77030018729 HC ELECTRD DEFIB PAD CARD -B: Performed by: THORACIC SURGERY (CARDIOTHORACIC VASCULAR SURGERY)

## 2018-04-19 PROCEDURE — 77030002996 HC SUT SLK J&J -A: Performed by: THORACIC SURGERY (CARDIOTHORACIC VASCULAR SURGERY)

## 2018-04-19 PROCEDURE — 88305 TISSUE EXAM BY PATHOLOGIST: CPT | Performed by: THORACIC SURGERY (CARDIOTHORACIC VASCULAR SURGERY)

## 2018-04-19 PROCEDURE — 77030037092 HC DRN CHST DRY SUCT WATER SEAL GTNG -B: Performed by: THORACIC SURGERY (CARDIOTHORACIC VASCULAR SURGERY)

## 2018-04-19 PROCEDURE — 83735 ASSAY OF MAGNESIUM: CPT | Performed by: THORACIC SURGERY (CARDIOTHORACIC VASCULAR SURGERY)

## 2018-04-19 PROCEDURE — 77030025827 HC BG BLD DNR AUTLG MEDT -A: Performed by: THORACIC SURGERY (CARDIOTHORACIC VASCULAR SURGERY)

## 2018-04-19 PROCEDURE — 77030002912 HC SUT ETHBND J&J -A: Performed by: THORACIC SURGERY (CARDIOTHORACIC VASCULAR SURGERY)

## 2018-04-19 PROCEDURE — 85027 COMPLETE CBC AUTOMATED: CPT | Performed by: THORACIC SURGERY (CARDIOTHORACIC VASCULAR SURGERY)

## 2018-04-19 PROCEDURE — 77030018547 HC SUT ETHBND1 J&J -B: Performed by: THORACIC SURGERY (CARDIOTHORACIC VASCULAR SURGERY)

## 2018-04-19 PROCEDURE — 77030019908 HC STETH ESOPH SIMS -A: Performed by: NURSE ANESTHETIST, CERTIFIED REGISTERED

## 2018-04-19 PROCEDURE — 74011000250 HC RX REV CODE- 250

## 2018-04-19 PROCEDURE — 77030018836 HC SOL IRR NACL ICUM -A: Performed by: THORACIC SURGERY (CARDIOTHORACIC VASCULAR SURGERY)

## 2018-04-19 PROCEDURE — 77030018548 HC SUT ETHBND2 J&J -B: Performed by: THORACIC SURGERY (CARDIOTHORACIC VASCULAR SURGERY)

## 2018-04-19 PROCEDURE — 77030002888 HC SUT CHRMC J&J -A: Performed by: THORACIC SURGERY (CARDIOTHORACIC VASCULAR SURGERY)

## 2018-04-19 PROCEDURE — 76060000043 HC ANESTHESIA 6 TO 6.5 HR: Performed by: THORACIC SURGERY (CARDIOTHORACIC VASCULAR SURGERY)

## 2018-04-19 PROCEDURE — 77030005326 HC CATH PAIN PMP/Q AVNM -C: Performed by: THORACIC SURGERY (CARDIOTHORACIC VASCULAR SURGERY)

## 2018-04-19 PROCEDURE — 02580ZZ DESTRUCTION OF CONDUCTION MECHANISM, OPEN APPROACH: ICD-10-PCS | Performed by: THORACIC SURGERY (CARDIOTHORACIC VASCULAR SURGERY)

## 2018-04-19 PROCEDURE — 80048 BASIC METABOLIC PNL TOTAL CA: CPT | Performed by: THORACIC SURGERY (CARDIOTHORACIC VASCULAR SURGERY)

## 2018-04-19 PROCEDURE — 77030030163 HC BN WAX J&J -A: Performed by: THORACIC SURGERY (CARDIOTHORACIC VASCULAR SURGERY)

## 2018-04-19 PROCEDURE — 84132 ASSAY OF SERUM POTASSIUM: CPT

## 2018-04-19 PROCEDURE — 85730 THROMBOPLASTIN TIME PARTIAL: CPT | Performed by: THORACIC SURGERY (CARDIOTHORACIC VASCULAR SURGERY)

## 2018-04-19 PROCEDURE — 77030012412 HC DRN WND CARD -B: Performed by: THORACIC SURGERY (CARDIOTHORACIC VASCULAR SURGERY)

## 2018-04-19 PROCEDURE — 86580 TB INTRADERMAL TEST: CPT | Performed by: THORACIC SURGERY (CARDIOTHORACIC VASCULAR SURGERY)

## 2018-04-19 PROCEDURE — 77030005537 HC CATH URETH BARD -A: Performed by: THORACIC SURGERY (CARDIOTHORACIC VASCULAR SURGERY)

## 2018-04-19 PROCEDURE — 77030011264 HC ELECTRD BLD EXT COVD -A: Performed by: THORACIC SURGERY (CARDIOTHORACIC VASCULAR SURGERY)

## 2018-04-19 PROCEDURE — 76010000219 HC CV SURG 6 TO 6.5 HR INTENSV-TIER 1: Performed by: THORACIC SURGERY (CARDIOTHORACIC VASCULAR SURGERY)

## 2018-04-19 PROCEDURE — 71045 X-RAY EXAM CHEST 1 VIEW: CPT

## 2018-04-19 PROCEDURE — 82947 ASSAY GLUCOSE BLOOD QUANT: CPT

## 2018-04-19 PROCEDURE — 77030010800: Performed by: THORACIC SURGERY (CARDIOTHORACIC VASCULAR SURGERY)

## 2018-04-19 PROCEDURE — 74011250636 HC RX REV CODE- 250/636: Performed by: THORACIC SURGERY (CARDIOTHORACIC VASCULAR SURGERY)

## 2018-04-19 PROCEDURE — 77030034927 HC PK PROC CPB INSPIRE PERF LIVA -F: Performed by: THORACIC SURGERY (CARDIOTHORACIC VASCULAR SURGERY)

## 2018-04-19 PROCEDURE — 77030005401 HC CATH RAD ARRO -A: Performed by: NURSE ANESTHETIST, CERTIFIED REGISTERED

## 2018-04-19 PROCEDURE — 77030002520 HC INSRT CLMP LATIS STLTH AMR -B: Performed by: THORACIC SURGERY (CARDIOTHORACIC VASCULAR SURGERY)

## 2018-04-19 PROCEDURE — 77030003010 HC SUT SURG STL J&J -B: Performed by: THORACIC SURGERY (CARDIOTHORACIC VASCULAR SURGERY)

## 2018-04-19 PROCEDURE — C2618 PROBE/NEEDLE, CRYO: HCPCS | Performed by: THORACIC SURGERY (CARDIOTHORACIC VASCULAR SURGERY)

## 2018-04-19 PROCEDURE — 74011000258 HC RX REV CODE- 258: Performed by: THORACIC SURGERY (CARDIOTHORACIC VASCULAR SURGERY)

## 2018-04-19 PROCEDURE — 77030020407 HC IV BLD WRMR ST 3M -A: Performed by: NURSE ANESTHETIST, CERTIFIED REGISTERED

## 2018-04-19 PROCEDURE — 77030016154: Performed by: THORACIC SURGERY (CARDIOTHORACIC VASCULAR SURGERY)

## 2018-04-19 PROCEDURE — 85610 PROTHROMBIN TIME: CPT | Performed by: THORACIC SURGERY (CARDIOTHORACIC VASCULAR SURGERY)

## 2018-04-19 PROCEDURE — 82803 BLOOD GASES ANY COMBINATION: CPT

## 2018-04-19 PROCEDURE — 77030013794 HC KT TRNSDUC BLD EDWD -B: Performed by: NURSE ANESTHETIST, CERTIFIED REGISTERED

## 2018-04-19 PROCEDURE — 85018 HEMOGLOBIN: CPT | Performed by: THORACIC SURGERY (CARDIOTHORACIC VASCULAR SURGERY)

## 2018-04-19 PROCEDURE — 77030009355 HC CRDPLG DEL SET QUES -C: Performed by: THORACIC SURGERY (CARDIOTHORACIC VASCULAR SURGERY)

## 2018-04-19 PROCEDURE — 77030003422 HC NDL ASPIR NOVO -A: Performed by: THORACIC SURGERY (CARDIOTHORACIC VASCULAR SURGERY)

## 2018-04-19 PROCEDURE — 77030011235 HC DRN PERCRD SUMP MEDT -B: Performed by: THORACIC SURGERY (CARDIOTHORACIC VASCULAR SURGERY)

## 2018-04-19 PROCEDURE — 77030005521 HC CATH URETH FOL38 BARD -B: Performed by: THORACIC SURGERY (CARDIOTHORACIC VASCULAR SURGERY)

## 2018-04-19 PROCEDURE — 82962 GLUCOSE BLOOD TEST: CPT

## 2018-04-19 PROCEDURE — C1729 CATH, DRAINAGE: HCPCS | Performed by: THORACIC SURGERY (CARDIOTHORACIC VASCULAR SURGERY)

## 2018-04-19 PROCEDURE — 77030031139 HC SUT VCRL2 J&J -A: Performed by: THORACIC SURGERY (CARDIOTHORACIC VASCULAR SURGERY)

## 2018-04-19 PROCEDURE — 74011636637 HC RX REV CODE- 636/637

## 2018-04-19 PROCEDURE — 77030006247 HC LD PCMKR MYOCRD BPLR TEMP MEDT -B: Performed by: THORACIC SURGERY (CARDIOTHORACIC VASCULAR SURGERY)

## 2018-04-19 PROCEDURE — 77030008671 HC TU ENDO/BRNC CUF COVD -B: Performed by: NURSE ANESTHETIST, CERTIFIED REGISTERED

## 2018-04-19 PROCEDURE — 82330 ASSAY OF CALCIUM: CPT

## 2018-04-19 PROCEDURE — P9047 ALBUMIN (HUMAN), 25%, 50ML: HCPCS

## 2018-04-19 PROCEDURE — 77030013292 HC BOWL MX PRSM J&J -A: Performed by: NURSE ANESTHETIST, CERTIFIED REGISTERED

## 2018-04-19 PROCEDURE — 77030002970 HC SUT PLEDG TELE -A: Performed by: THORACIC SURGERY (CARDIOTHORACIC VASCULAR SURGERY)

## 2018-04-19 PROCEDURE — 77030020782 HC GWN BAIR PAWS FLX 3M -B: Performed by: NURSE ANESTHETIST, CERTIFIED REGISTERED

## 2018-04-19 PROCEDURE — 93005 ELECTROCARDIOGRAM TRACING: CPT | Performed by: THORACIC SURGERY (CARDIOTHORACIC VASCULAR SURGERY)

## 2018-04-19 PROCEDURE — 76010000155 HC AUTO TRANSFUSION/CELL SAVER: Performed by: THORACIC SURGERY (CARDIOTHORACIC VASCULAR SURGERY)

## 2018-04-19 PROCEDURE — 77030002895 HC DEV VASC CLOSR COVD -B: Performed by: THORACIC SURGERY (CARDIOTHORACIC VASCULAR SURGERY)

## 2018-04-19 PROCEDURE — 84132 ASSAY OF SERUM POTASSIUM: CPT | Performed by: THORACIC SURGERY (CARDIOTHORACIC VASCULAR SURGERY)

## 2018-04-19 PROCEDURE — 77030008771 HC TU NG SALEM SUMP -A: Performed by: NURSE ANESTHETIST, CERTIFIED REGISTERED

## 2018-04-19 PROCEDURE — 80051 ELECTROLYTE PANEL: CPT

## 2018-04-19 PROCEDURE — 77030013797 HC KT TRNSDUC PRSSR EDWD -A: Performed by: THORACIC SURGERY (CARDIOTHORACIC VASCULAR SURGERY)

## 2018-04-19 PROCEDURE — 77030008703 HC TU ET UNCUF COVD -A: Performed by: NURSE ANESTHETIST, CERTIFIED REGISTERED

## 2018-04-19 PROCEDURE — 74011250636 HC RX REV CODE- 250/636

## 2018-04-19 PROCEDURE — 74011000250 HC RX REV CODE- 250: Performed by: THORACIC SURGERY (CARDIOTHORACIC VASCULAR SURGERY)

## 2018-04-19 PROCEDURE — 94002 VENT MGMT INPAT INIT DAY: CPT

## 2018-04-19 PROCEDURE — 36600 WITHDRAWAL OF ARTERIAL BLOOD: CPT

## 2018-04-19 PROCEDURE — 77030002986 HC SUT PROL J&J -A: Performed by: THORACIC SURGERY (CARDIOTHORACIC VASCULAR SURGERY)

## 2018-04-19 PROCEDURE — 74011000258 HC RX REV CODE- 258

## 2018-04-19 PROCEDURE — 85384 FIBRINOGEN ACTIVITY: CPT | Performed by: THORACIC SURGERY (CARDIOTHORACIC VASCULAR SURGERY)

## 2018-04-19 PROCEDURE — 99254 IP/OBS CNSLTJ NEW/EST MOD 60: CPT | Performed by: INTERNAL MEDICINE

## 2018-04-19 PROCEDURE — 74011000302 HC RX REV CODE- 302: Performed by: THORACIC SURGERY (CARDIOTHORACIC VASCULAR SURGERY)

## 2018-04-19 PROCEDURE — 02UG08Z SUPPLEMENT MITRAL VALVE WITH ZOOPLASTIC TISSUE, OPEN APPROACH: ICD-10-PCS | Performed by: THORACIC SURGERY (CARDIOTHORACIC VASCULAR SURGERY)

## 2018-04-19 PROCEDURE — 77030037378 HC BRONCHOSCOPE DISP TRAN -D: Performed by: NURSE ANESTHETIST, CERTIFIED REGISTERED

## 2018-04-19 PROCEDURE — C1769 GUIDE WIRE: HCPCS | Performed by: THORACIC SURGERY (CARDIOTHORACIC VASCULAR SURGERY)

## 2018-04-19 PROCEDURE — 77030016564 HC BLD STRNL SAW4 CNMD -B: Performed by: THORACIC SURGERY (CARDIOTHORACIC VASCULAR SURGERY)

## 2018-04-19 PROCEDURE — 77030018834: Performed by: THORACIC SURGERY (CARDIOTHORACIC VASCULAR SURGERY)

## 2018-04-19 PROCEDURE — 74011250637 HC RX REV CODE- 250/637: Performed by: THORACIC SURGERY (CARDIOTHORACIC VASCULAR SURGERY)

## 2018-04-19 PROCEDURE — 5A1223Z PERFORMANCE OF CARDIAC PACING, CONTINUOUS: ICD-10-PCS | Performed by: THORACIC SURGERY (CARDIOTHORACIC VASCULAR SURGERY)

## 2018-04-19 PROCEDURE — 77030018792 HC NDL SUT TFSH -A: Performed by: THORACIC SURGERY (CARDIOTHORACIC VASCULAR SURGERY)

## 2018-04-19 PROCEDURE — 77030020268 HC MISC GENERAL SUPPLY: Performed by: THORACIC SURGERY (CARDIOTHORACIC VASCULAR SURGERY)

## 2018-04-19 PROCEDURE — 5A1221Z PERFORMANCE OF CARDIAC OUTPUT, CONTINUOUS: ICD-10-PCS | Performed by: THORACIC SURGERY (CARDIOTHORACIC VASCULAR SURGERY)

## 2018-04-19 PROCEDURE — 65610000006 HC RM INTENSIVE CARE

## 2018-04-19 PROCEDURE — 84295 ASSAY OF SERUM SODIUM: CPT

## 2018-04-19 PROCEDURE — 77030020751 HC FLTR TBNG TRNSFUS HAEM -A: Performed by: NURSE ANESTHETIST, CERTIFIED REGISTERED

## 2018-04-19 PROCEDURE — 77030011640 HC PAD GRND REM COVD -A: Performed by: THORACIC SURGERY (CARDIOTHORACIC VASCULAR SURGERY)

## 2018-04-19 PROCEDURE — C1751 CATH, INF, PER/CENT/MIDLINE: HCPCS | Performed by: NURSE ANESTHETIST, CERTIFIED REGISTERED

## 2018-04-19 PROCEDURE — 74011250636 HC RX REV CODE- 250/636: Performed by: ANESTHESIOLOGY

## 2018-04-19 PROCEDURE — 77030026438 HC STYL ET INTUB CARD -A: Performed by: NURSE ANESTHETIST, CERTIFIED REGISTERED

## 2018-04-19 PROCEDURE — 77030008477 HC STYL SATN SLP COVD -A: Performed by: NURSE ANESTHETIST, CERTIFIED REGISTERED

## 2018-04-19 PROCEDURE — 77030033070 HC RLD QLC FPCH COR-KNOT LSIS -C: Performed by: THORACIC SURGERY (CARDIOTHORACIC VASCULAR SURGERY)

## 2018-04-19 PROCEDURE — 77030026882 HC RNG ANUPLST MTRL PHY EDWD -I1: Performed by: THORACIC SURGERY (CARDIOTHORACIC VASCULAR SURGERY)

## 2018-04-19 PROCEDURE — 77030025646 HC AUTOTRNSFUS KT TERU -C: Performed by: THORACIC SURGERY (CARDIOTHORACIC VASCULAR SURGERY)

## 2018-04-19 PROCEDURE — 77030013386: Performed by: THORACIC SURGERY (CARDIOTHORACIC VASCULAR SURGERY)

## 2018-04-19 PROCEDURE — 02BG0ZZ EXCISION OF MITRAL VALVE, OPEN APPROACH: ICD-10-PCS | Performed by: THORACIC SURGERY (CARDIOTHORACIC VASCULAR SURGERY)

## 2018-04-19 DEVICE — IMPLANTABLE DEVICE: Type: IMPLANTABLE DEVICE | Site: MITRAL VALVE | Status: FUNCTIONAL

## 2018-04-19 RX ORDER — KETOROLAC TROMETHAMINE 30 MG/ML
30 INJECTION, SOLUTION INTRAMUSCULAR; INTRAVENOUS
Status: COMPLETED | OUTPATIENT
Start: 2018-04-19 | End: 2018-04-19

## 2018-04-19 RX ORDER — PROPOFOL 10 MG/ML
INJECTION, EMULSION INTRAVENOUS AS NEEDED
Status: DISCONTINUED | OUTPATIENT
Start: 2018-04-19 | End: 2018-04-19 | Stop reason: HOSPADM

## 2018-04-19 RX ORDER — SUFENTANIL CITRATE 50 UG/ML
INJECTION EPIDURAL; INTRAVENOUS AS NEEDED
Status: DISCONTINUED | OUTPATIENT
Start: 2018-04-19 | End: 2018-04-19 | Stop reason: HOSPADM

## 2018-04-19 RX ORDER — ROCURONIUM BROMIDE 10 MG/ML
INJECTION, SOLUTION INTRAVENOUS AS NEEDED
Status: DISCONTINUED | OUTPATIENT
Start: 2018-04-19 | End: 2018-04-19 | Stop reason: HOSPADM

## 2018-04-19 RX ORDER — ONDANSETRON 2 MG/ML
4 INJECTION INTRAMUSCULAR; INTRAVENOUS
Status: DISCONTINUED | OUTPATIENT
Start: 2018-04-19 | End: 2018-04-20

## 2018-04-19 RX ORDER — OXYCODONE AND ACETAMINOPHEN 5; 325 MG/1; MG/1
1 TABLET ORAL
Status: DISCONTINUED | OUTPATIENT
Start: 2018-04-19 | End: 2018-04-20

## 2018-04-19 RX ORDER — LIDOCAINE HCL/PF 100 MG/5ML
50-100 SYRINGE (ML) INTRAVENOUS
Status: DISCONTINUED | OUTPATIENT
Start: 2018-04-19 | End: 2018-04-20

## 2018-04-19 RX ORDER — MIDAZOLAM HYDROCHLORIDE 1 MG/ML
2 INJECTION, SOLUTION INTRAMUSCULAR; INTRAVENOUS ONCE
Status: DISCONTINUED | OUTPATIENT
Start: 2018-04-19 | End: 2018-04-19 | Stop reason: HOSPADM

## 2018-04-19 RX ORDER — MUPIROCIN 20 MG/G
OINTMENT TOPICAL ONCE
Status: COMPLETED | OUTPATIENT
Start: 2018-04-19 | End: 2018-04-19

## 2018-04-19 RX ORDER — SODIUM CHLORIDE, SODIUM LACTATE, POTASSIUM CHLORIDE, CALCIUM CHLORIDE 600; 310; 30; 20 MG/100ML; MG/100ML; MG/100ML; MG/100ML
INJECTION, SOLUTION INTRAVENOUS
Status: DISCONTINUED | OUTPATIENT
Start: 2018-04-19 | End: 2018-04-19 | Stop reason: HOSPADM

## 2018-04-19 RX ORDER — LIDOCAINE HYDROCHLORIDE 20 MG/ML
INJECTION, SOLUTION EPIDURAL; INFILTRATION; INTRACAUDAL; PERINEURAL AS NEEDED
Status: DISCONTINUED | OUTPATIENT
Start: 2018-04-19 | End: 2018-04-19 | Stop reason: HOSPADM

## 2018-04-19 RX ORDER — METOPROLOL TARTRATE 25 MG/1
25 TABLET, FILM COATED ORAL EVERY 12 HOURS
Status: DISCONTINUED | OUTPATIENT
Start: 2018-04-20 | End: 2018-04-20

## 2018-04-19 RX ORDER — MORPHINE SULFATE 10 MG/ML
3-5 INJECTION, SOLUTION INTRAMUSCULAR; INTRAVENOUS
Status: DISCONTINUED | OUTPATIENT
Start: 2018-04-19 | End: 2018-04-20

## 2018-04-19 RX ORDER — SODIUM BICARBONATE 1 MEQ/ML
50 SYRINGE (ML) INTRAVENOUS AS NEEDED
Status: DISCONTINUED | OUTPATIENT
Start: 2018-04-19 | End: 2018-04-20

## 2018-04-19 RX ORDER — SODIUM CHLORIDE 0.9 % (FLUSH) 0.9 %
5-10 SYRINGE (ML) INJECTION AS NEEDED
Status: DISCONTINUED | OUTPATIENT
Start: 2018-04-19 | End: 2018-04-20

## 2018-04-19 RX ORDER — CEFAZOLIN SODIUM/WATER 2 G/20 ML
2 SYRINGE (ML) INTRAVENOUS EVERY 8 HOURS
Status: COMPLETED | OUTPATIENT
Start: 2018-04-19 | End: 2018-04-20

## 2018-04-19 RX ORDER — PROPOFOL 10 MG/ML
0-50 VIAL (ML) INTRAVENOUS
Status: DISCONTINUED | OUTPATIENT
Start: 2018-04-19 | End: 2018-04-20

## 2018-04-19 RX ORDER — EPHEDRINE SULFATE 50 MG/ML
INJECTION, SOLUTION INTRAVENOUS AS NEEDED
Status: DISCONTINUED | OUTPATIENT
Start: 2018-04-19 | End: 2018-04-19 | Stop reason: HOSPADM

## 2018-04-19 RX ORDER — MUPIROCIN 20 MG/G
OINTMENT TOPICAL 2 TIMES DAILY
Status: DISCONTINUED | OUTPATIENT
Start: 2018-04-19 | End: 2018-04-20

## 2018-04-19 RX ORDER — VECURONIUM BROMIDE FOR INJECTION 1 MG/ML
INJECTION, POWDER, LYOPHILIZED, FOR SOLUTION INTRAVENOUS AS NEEDED
Status: DISCONTINUED | OUTPATIENT
Start: 2018-04-19 | End: 2018-04-19 | Stop reason: HOSPADM

## 2018-04-19 RX ORDER — MIDAZOLAM HYDROCHLORIDE 1 MG/ML
INJECTION, SOLUTION INTRAMUSCULAR; INTRAVENOUS AS NEEDED
Status: DISCONTINUED | OUTPATIENT
Start: 2018-04-19 | End: 2018-04-19 | Stop reason: HOSPADM

## 2018-04-19 RX ORDER — FENTANYL CITRATE 50 UG/ML
100 INJECTION, SOLUTION INTRAMUSCULAR; INTRAVENOUS ONCE
Status: DISCONTINUED | OUTPATIENT
Start: 2018-04-19 | End: 2018-04-19 | Stop reason: HOSPADM

## 2018-04-19 RX ORDER — LIDOCAINE HYDROCHLORIDE 10 MG/ML
0.1 INJECTION INFILTRATION; PERINEURAL AS NEEDED
Status: DISCONTINUED | OUTPATIENT
Start: 2018-04-19 | End: 2018-04-19 | Stop reason: HOSPADM

## 2018-04-19 RX ORDER — SODIUM CHLORIDE 9 MG/ML
INJECTION, SOLUTION INTRAVENOUS
Status: DISCONTINUED | OUTPATIENT
Start: 2018-04-19 | End: 2018-04-19 | Stop reason: HOSPADM

## 2018-04-19 RX ORDER — NALOXONE HYDROCHLORIDE 0.4 MG/ML
0.4 INJECTION, SOLUTION INTRAMUSCULAR; INTRAVENOUS; SUBCUTANEOUS AS NEEDED
Status: DISCONTINUED | OUTPATIENT
Start: 2018-04-19 | End: 2018-04-20

## 2018-04-19 RX ORDER — PROPOFOL 10 MG/ML
INJECTION, EMULSION INTRAVENOUS
Status: DISCONTINUED | OUTPATIENT
Start: 2018-04-19 | End: 2018-04-19 | Stop reason: HOSPADM

## 2018-04-19 RX ORDER — SERTRALINE HYDROCHLORIDE 100 MG/1
100 TABLET, FILM COATED ORAL DAILY
Status: DISCONTINUED | OUTPATIENT
Start: 2018-04-20 | End: 2018-04-24 | Stop reason: HOSPADM

## 2018-04-19 RX ORDER — HEPARIN SODIUM 1000 [USP'U]/ML
INJECTION, SOLUTION INTRAVENOUS; SUBCUTANEOUS AS NEEDED
Status: DISCONTINUED | OUTPATIENT
Start: 2018-04-19 | End: 2018-04-19 | Stop reason: HOSPADM

## 2018-04-19 RX ORDER — POTASSIUM CHLORIDE 14.9 MG/ML
10 INJECTION INTRAVENOUS AS NEEDED
Status: DISCONTINUED | OUTPATIENT
Start: 2018-04-19 | End: 2018-04-20

## 2018-04-19 RX ORDER — GUAIFENESIN 100 MG/5ML
81 LIQUID (ML) ORAL DAILY
Status: DISCONTINUED | OUTPATIENT
Start: 2018-04-20 | End: 2018-04-20

## 2018-04-19 RX ORDER — CEFAZOLIN SODIUM 1 G/3ML
INJECTION, POWDER, FOR SOLUTION INTRAMUSCULAR; INTRAVENOUS AS NEEDED
Status: DISCONTINUED | OUTPATIENT
Start: 2018-04-19 | End: 2018-04-19 | Stop reason: HOSPADM

## 2018-04-19 RX ORDER — MIDAZOLAM HYDROCHLORIDE 1 MG/ML
1 INJECTION, SOLUTION INTRAMUSCULAR; INTRAVENOUS
Status: DISCONTINUED | OUTPATIENT
Start: 2018-04-19 | End: 2018-04-20

## 2018-04-19 RX ORDER — CEFAZOLIN SODIUM/WATER 2 G/20 ML
2 SYRINGE (ML) INTRAVENOUS ONCE
Status: COMPLETED | OUTPATIENT
Start: 2018-04-19 | End: 2018-04-19

## 2018-04-19 RX ORDER — DEXTROSE, SODIUM CHLORIDE, AND POTASSIUM CHLORIDE 5; .45; .15 G/100ML; G/100ML; G/100ML
25 INJECTION INTRAVENOUS CONTINUOUS
Status: DISCONTINUED | OUTPATIENT
Start: 2018-04-19 | End: 2018-04-20

## 2018-04-19 RX ORDER — KETOROLAC TROMETHAMINE 15 MG/ML
15 INJECTION, SOLUTION INTRAMUSCULAR; INTRAVENOUS EVERY 6 HOURS
Status: COMPLETED | OUTPATIENT
Start: 2018-04-19 | End: 2018-04-20

## 2018-04-19 RX ORDER — SODIUM CHLORIDE 0.9 % (FLUSH) 0.9 %
5-10 SYRINGE (ML) INJECTION EVERY 8 HOURS
Status: DISCONTINUED | OUTPATIENT
Start: 2018-04-19 | End: 2018-04-20

## 2018-04-19 RX ORDER — NITROGLYCERIN 20 MG/100ML
10-100 INJECTION INTRAVENOUS
Status: DISCONTINUED | OUTPATIENT
Start: 2018-04-19 | End: 2018-04-20

## 2018-04-19 RX ORDER — PROTAMINE SULFATE 10 MG/ML
INJECTION, SOLUTION INTRAVENOUS AS NEEDED
Status: DISCONTINUED | OUTPATIENT
Start: 2018-04-19 | End: 2018-04-19 | Stop reason: HOSPADM

## 2018-04-19 RX ORDER — SODIUM CHLORIDE 9 MG/ML
25 INJECTION, SOLUTION INTRAVENOUS CONTINUOUS
Status: DISCONTINUED | OUTPATIENT
Start: 2018-04-19 | End: 2018-04-20

## 2018-04-19 RX ORDER — DEXTROSE 50 % IN WATER (D50W) INTRAVENOUS SYRINGE
25 AS NEEDED
Status: DISCONTINUED | OUTPATIENT
Start: 2018-04-19 | End: 2018-04-20

## 2018-04-19 RX ORDER — MIDAZOLAM HYDROCHLORIDE 1 MG/ML
2 INJECTION, SOLUTION INTRAMUSCULAR; INTRAVENOUS
Status: DISCONTINUED | OUTPATIENT
Start: 2018-04-19 | End: 2018-04-19 | Stop reason: HOSPADM

## 2018-04-19 RX ORDER — MAGNESIUM SULFATE 1 G/100ML
1 INJECTION INTRAVENOUS AS NEEDED
Status: DISCONTINUED | OUTPATIENT
Start: 2018-04-19 | End: 2018-04-20

## 2018-04-19 RX ORDER — CHLORHEXIDINE GLUCONATE 1.2 MG/ML
10 RINSE ORAL 2 TIMES DAILY
Status: DISCONTINUED | OUTPATIENT
Start: 2018-04-19 | End: 2018-04-20

## 2018-04-19 RX ADMIN — MIDAZOLAM HYDROCHLORIDE 2 MG: 1 INJECTION, SOLUTION INTRAMUSCULAR; INTRAVENOUS at 08:06

## 2018-04-19 RX ADMIN — EPHEDRINE SULFATE 5 MG: 50 INJECTION, SOLUTION INTRAVENOUS at 08:55

## 2018-04-19 RX ADMIN — SUFENTANIL CITRATE 20 MCG: 50 INJECTION EPIDURAL; INTRAVENOUS at 08:18

## 2018-04-19 RX ADMIN — SUFENTANIL CITRATE 25 MCG: 50 INJECTION EPIDURAL; INTRAVENOUS at 09:18

## 2018-04-19 RX ADMIN — SUFENTANIL CITRATE 25 MCG: 50 INJECTION EPIDURAL; INTRAVENOUS at 13:47

## 2018-04-19 RX ADMIN — SUFENTANIL CITRATE 50 MCG: 50 INJECTION EPIDURAL; INTRAVENOUS at 10:11

## 2018-04-19 RX ADMIN — MIDAZOLAM HYDROCHLORIDE 2 MG: 1 INJECTION, SOLUTION INTRAMUSCULAR; INTRAVENOUS at 08:18

## 2018-04-19 RX ADMIN — DRONEDARONE 400 MG: 400 TABLET, FILM COATED ORAL at 21:22

## 2018-04-19 RX ADMIN — EPHEDRINE SULFATE 15 MG: 50 INJECTION, SOLUTION INTRAVENOUS at 08:20

## 2018-04-19 RX ADMIN — PROPOFOL 20 MCG/KG/MIN: 10 INJECTION, EMULSION INTRAVENOUS at 13:46

## 2018-04-19 RX ADMIN — SUFENTANIL CITRATE 50 MCG: 50 INJECTION EPIDURAL; INTRAVENOUS at 09:36

## 2018-04-19 RX ADMIN — Medication 2 G: at 09:10

## 2018-04-19 RX ADMIN — VECURONIUM BROMIDE FOR INJECTION 2 MG: 1 INJECTION, POWDER, LYOPHILIZED, FOR SOLUTION INTRAVENOUS at 10:45

## 2018-04-19 RX ADMIN — HEPARIN SODIUM 22000 UNITS: 1000 INJECTION, SOLUTION INTRAVENOUS; SUBCUTANEOUS at 10:27

## 2018-04-19 RX ADMIN — CHLORHEXIDINE GLUCONATE 10 ML: 1.2 RINSE ORAL at 14:51

## 2018-04-19 RX ADMIN — SODIUM CHLORIDE, SODIUM LACTATE, POTASSIUM CHLORIDE, CALCIUM CHLORIDE: 600; 310; 30; 20 INJECTION, SOLUTION INTRAVENOUS at 08:06

## 2018-04-19 RX ADMIN — OXYCODONE HYDROCHLORIDE AND ACETAMINOPHEN 1 TABLET: 5; 325 TABLET ORAL at 15:49

## 2018-04-19 RX ADMIN — LIDOCAINE HYDROCHLORIDE 80 MG: 20 INJECTION, SOLUTION EPIDURAL; INFILTRATION; INTRACAUDAL; PERINEURAL at 08:18

## 2018-04-19 RX ADMIN — Medication 10 ML: at 14:51

## 2018-04-19 RX ADMIN — PROPOFOL 100 MG: 10 INJECTION, EMULSION INTRAVENOUS at 08:18

## 2018-04-19 RX ADMIN — SODIUM CHLORIDE, SODIUM LACTATE, POTASSIUM CHLORIDE, AND CALCIUM CHLORIDE 25 ML/HR: 600; 310; 30; 20 INJECTION, SOLUTION INTRAVENOUS at 06:47

## 2018-04-19 RX ADMIN — FAMOTIDINE 20 MG: 10 INJECTION INTRAVENOUS at 21:23

## 2018-04-19 RX ADMIN — ROCURONIUM BROMIDE 30 MG: 10 INJECTION, SOLUTION INTRAVENOUS at 12:42

## 2018-04-19 RX ADMIN — TUBERCULIN PURIFIED PROTEIN DERIVATIVE 5 UNITS: 5 INJECTION, SOLUTION INTRADERMAL at 21:29

## 2018-04-19 RX ADMIN — EPHEDRINE SULFATE 5 MG: 50 INJECTION, SOLUTION INTRAVENOUS at 08:18

## 2018-04-19 RX ADMIN — PHENYLEPHRINE HYDROCHLORIDE 20 MCG/MIN: 10 INJECTION INTRAMUSCULAR; INTRAVENOUS; SUBCUTANEOUS at 19:40

## 2018-04-19 RX ADMIN — EPHEDRINE SULFATE 10 MG: 50 INJECTION, SOLUTION INTRAVENOUS at 08:21

## 2018-04-19 RX ADMIN — SODIUM CHLORIDE 500 MG/HR: 900 INJECTION, SOLUTION INTRAVENOUS at 09:04

## 2018-04-19 RX ADMIN — DEXTROSE MONOHYDRATE, SODIUM CHLORIDE, AND POTASSIUM CHLORIDE 25 ML/HR: 50; 4.5; 1.49 INJECTION, SOLUTION INTRAVENOUS at 14:51

## 2018-04-19 RX ADMIN — CEFAZOLIN SODIUM 2 G: 1 INJECTION, POWDER, FOR SOLUTION INTRAMUSCULAR; INTRAVENOUS at 12:30

## 2018-04-19 RX ADMIN — KETOROLAC TROMETHAMINE 15 MG: 15 INJECTION, SOLUTION INTRAMUSCULAR; INTRAVENOUS at 21:26

## 2018-04-19 RX ADMIN — PROTAMINE SULFATE 220 MG: 10 INJECTION, SOLUTION INTRAVENOUS at 13:33

## 2018-04-19 RX ADMIN — SODIUM CHLORIDE 25 ML/HR: 900 INJECTION, SOLUTION INTRAVENOUS at 14:54

## 2018-04-19 RX ADMIN — SUFENTANIL CITRATE 5 MCG: 50 INJECTION EPIDURAL; INTRAVENOUS at 08:09

## 2018-04-19 RX ADMIN — MUPIROCIN: 20 OINTMENT TOPICAL at 21:23

## 2018-04-19 RX ADMIN — SODIUM CHLORIDE: 9 INJECTION, SOLUTION INTRAVENOUS at 08:40

## 2018-04-19 RX ADMIN — SODIUM CHLORIDE 1 G: 900 INJECTION, SOLUTION INTRAVENOUS at 08:45

## 2018-04-19 RX ADMIN — Medication 2 G: at 19:36

## 2018-04-19 RX ADMIN — MIDAZOLAM HYDROCHLORIDE 1 MG: 1 INJECTION, SOLUTION INTRAMUSCULAR; INTRAVENOUS at 08:09

## 2018-04-19 RX ADMIN — MIDAZOLAM HYDROCHLORIDE 2 MG: 1 INJECTION, SOLUTION INTRAMUSCULAR; INTRAVENOUS at 06:56

## 2018-04-19 RX ADMIN — Medication 10 ML: at 21:23

## 2018-04-19 RX ADMIN — EPHEDRINE SULFATE 10 MG: 50 INJECTION, SOLUTION INTRAVENOUS at 08:19

## 2018-04-19 RX ADMIN — VECURONIUM BROMIDE FOR INJECTION 3 MG: 1 INJECTION, POWDER, LYOPHILIZED, FOR SOLUTION INTRAVENOUS at 09:18

## 2018-04-19 RX ADMIN — OXYCODONE HYDROCHLORIDE AND ACETAMINOPHEN 1 TABLET: 5; 325 TABLET ORAL at 19:36

## 2018-04-19 RX ADMIN — VECURONIUM BROMIDE FOR INJECTION 2 MG: 1 INJECTION, POWDER, LYOPHILIZED, FOR SOLUTION INTRAVENOUS at 08:48

## 2018-04-19 RX ADMIN — ROCURONIUM BROMIDE 50 MG: 10 INJECTION, SOLUTION INTRAVENOUS at 08:18

## 2018-04-19 RX ADMIN — MUPIROCIN: 20 OINTMENT TOPICAL at 07:00

## 2018-04-19 RX ADMIN — KETOROLAC TROMETHAMINE 30 MG: 30 INJECTION, SOLUTION INTRAMUSCULAR at 16:19

## 2018-04-19 RX ADMIN — VECURONIUM BROMIDE FOR INJECTION 3 MG: 1 INJECTION, POWDER, LYOPHILIZED, FOR SOLUTION INTRAVENOUS at 09:50

## 2018-04-19 RX ADMIN — MIDAZOLAM HYDROCHLORIDE 3 MG: 1 INJECTION, SOLUTION INTRAMUSCULAR; INTRAVENOUS at 12:42

## 2018-04-19 RX ADMIN — ROCURONIUM BROMIDE 20 MG: 10 INJECTION, SOLUTION INTRAVENOUS at 11:15

## 2018-04-19 NOTE — ANESTHESIA PROCEDURE NOTES
Central Line Placement    Start time: 4/19/2018 8:20 AM  End time: 4/19/2018 8:35 AM  Performed by: Lani Mendes  Authorized by: Lani Mendes     Indications: vascular access, central pressure monitoring and need for vasopressors  Preanesthetic Checklist: patient identified, risks and benefits discussed, anesthesia consent, site marked, patient being monitored and timeout performed    Timeout Time: 08:20       Pre-procedure: All elements of maximal sterile barrier technique followed? Yes    2% Chlorhexidine for cutaneous antisepsis, Hand hygiene performed prior to catheter insertion and Ultrasound guidance    Sterile Ultrasound Technique followed?: Yes        Ultrasound Image Stored?  Image stored    Procedure:   Prep:  ChloraPrep  Location:  Internal jugular  Orientation:  Right  Patient position:  Trendelenburg  Catheter type:  Double lumen  Catheter size:  9 Fr  Catheter length:  10 cm  Number of attempts:  1  Successful placement: Yes      Assessment:   Post-procedure:  Catheter secured, sterile dressing applied and sterile dressing with CHG applied  Assessment:  Placement verified by x-ray, free fluid flow, blood return through all ports and guidewire removal verified  Insertion:  Uncomplicated  Patient tolerance:  Patient tolerated the procedure well with no immediate complications  PA Cath at 48 cm

## 2018-04-19 NOTE — PROGRESS NOTES
Ventilator check complete; patient has a #7.5 ET tube secured at the 23 at the lip. Patient is sedated. Patient is not able to follow commands. Breath sounds are diminished. Trachea is midline, Negative for subcutaneous air, and chest excursion is symmetric. Patient is also Negative for cyanosis and is Negative for pitting edema. All alarms are set and audible. Resuscitation bag is at the head of the bed. Ventilator Settings  Mode FIO2 Rate Tidal Volume Pressure PEEP I:E Ratio                           Peak airway pressure:     Minute ventilation:       ABG: No results for input(s): PH, PCO2, PO2, HCO3 in the last 72 hours.       Hieu Elias, RT

## 2018-04-19 NOTE — ANESTHESIA POSTPROCEDURE EVALUATION
Post-Anesthesia Evaluation and Assessment    Patient: Prisca Colon MRN: [id number]  SSN: [id number]    YOB: 1960  Age: 65 y.o. Sex: male       Cardiovascular Function/Vital Signs  Visit Vitals    /59    Pulse 83    Temp 36.6 °C (97.9 °F)    Resp 18    Ht 5' 7\" (1.702 m)    Wt 74.5 kg (164 lb 3.2 oz)    SpO2 100%    BMI 25.72 kg/m2       Patient is status post general anesthesia for Procedure(s):  MITRAL VALVE REPAIR (MVR) MINIMALLY INVASIVE. CRYOABLATION OF RIGHT INTERCOSTAL SPACES  MAZE PROCEDURE WITH CRYOABLATION  MALLORY/ANES PROBE  DR GARCIA FOR ANES. Nausea/Vomiting: None    Postoperative hydration reviewed and adequate. Pain:  Pain Scale 1: Visual (04/19/18 1425)  Pain Intensity 1: 0 (04/19/18 1425)   Managed    Neurological Status:   Neuro (WDL): Within Defined Limits (04/19/18 0646)   At baseline    Mental Status and Level of Consciousness: Arousable    Pulmonary Status:   High flow NC  Adequate oxygenation and airway patent    Complications related to anesthesia: None    Post-anesthesia assessment completed.  No concerns    Signed By: Lennox Pilot, MD     April 19, 2018

## 2018-04-19 NOTE — PERIOP NOTES
TRANSFER - OUT REPORT:    Verbal report given to Brenda Castro. 291 on [employer]  being transferred to CVICU(unit) for routine progression of care       Report consisted of patients Situation, Background, Assessment and   Recommendations(SBAR). Information from the following report(s) OR Summary was reviewed with the receiving nurse. Lines:   Double Lumen 04/19/18 Right Internal jugular (Active)       [place] Ill Dual 04/19/18 Right Neck (Active)       Peripheral IV 04/19/18 Right Hand (Active)   Site Assessment Clean, dry, & intact 4/19/2018  6:01 AM   Phlebitis Assessment 0 4/19/2018  6:01 AM   Infiltration Assessment 0 4/19/2018  6:01 AM   Dressing Status Clean, dry, & intact 4/19/2018  6:01 AM   Dressing Type Tape;Transparent 4/19/2018  6:01 AM   Hub Color/Line Status Green; Infusing 4/19/2018  6:01 AM       Arterial Line 04/19/18 Left Radial artery (Active)        Opportunity for questions and clarification was provided.       Patient transported with:   Monitor  O2 @ 15 liters  Registered Nurse   CRNA

## 2018-04-19 NOTE — PROGRESS NOTES
Respiratory Mechanics completed and are as follows:  Weaning Parameters  Spontaneous Breathing Trial Complete: Yes  Ve: 12.4  VT: 1158  NIF: -30  Patient extubated to a 60% airvo. Patient is able to communicate and is negative for stridor. Breath sounds are diminished. No complications with extubation.      Joshua Killian, RT

## 2018-04-19 NOTE — PROGRESS NOTES
's Pre-op visit requested by patient. Conveyed care and concern for patient and family. Offered prayer as requested for patient, family, and staff.     Edward Montgomery MDiv, BS  Board Certified

## 2018-04-19 NOTE — PROGRESS NOTES
TRANSFER - IN REPORT:    Verbal report received from Thomas Delcid CRNA (name) on [employer]  being received from [place] [id number] (unit) for routine post - op      Report consisted of patients Situation, Background, Assessment and   Recommendations(SBAR). Information from the following report(s) SBAR, Kardex, OR Summary, Intake/Output, MAR, Accordion and Recent Results was reviewed with the receiving nurse. Opportunity for questions and clarification was provided. Assessment completed upon patients arrival to unit and care assumed.

## 2018-04-19 NOTE — PERIOP NOTES
On arrival to preop, blood band noted to read Francisco Marcano. Confirmed with charge nurse, blood bank, OR desk and preop supervisor - okay given to proceed as is.

## 2018-04-19 NOTE — PERIOP NOTES
MD order on paper chart to not give Amiodarone day prior or day of surgery. Pt placed on 3 L O2 per NC per protocol. Allevyn dsg placed on chart per protocol.

## 2018-04-19 NOTE — IP AVS SNAPSHOT
303 [facility] 
 
 
 2329 [facility] 322 [facility] 
[phone] Patient: Sahara Obando 
MRN: [id number] KFS:[id number]/[id number] About your hospitalization You were admitted on:  April 19, 2018 You last received care in the:  [facility] 2 CV STEPDOWN You were discharged on:  April 24, 2018 Why you were hospitalized Your primary diagnosis was:  Mitral Valve Regurgitation Your diagnoses also included:  Respiratory Failure, Post-Operative (Hcc), Hypoxia, Anxiety, Mitral Valve Prolapse, S/P Mvr (Mitral Valve Repair), Atelectasis, First Degree Av Block, Mobitz (Type) I (Wenckebach's) Atrioventricular Block Follow-up Information Follow up With Details Comments Contact [facility] [address]  They will call you within 24 hours to schedule your home health visit. [address] [facility] [address] [facility] [address] 
[phone] [facility] On 5/4/2018 Your Cardiac Rehab orientation is scheduled for Friday, May 4th at 11:00 AM. Please arrive at 10:45 to check in with Registration in the Lobby before coming to your appt with Cardiac Rehab on 2nd floor. [address] Elly Garcia [phone] Mireille Adams MD   [address]  Jc Trin[place] 
[phone] Leona Millard MD On 5/8/2018 Appointment at 3:20. Please bring picture ID, list of medications and proof of insurance with you to appointment. 217 [facility] [address] [facility] [address] 
[phone] Jessenia On 5/1/2018 Appointment at10:00. [address] 
615 [facility] 73 [facility] 
[phone] Your Scheduled Appointments Tuesday May 01, 2018 10:00 AM EDT TRANSITIONAL CARE MANAGEMENT with Marcia Welch MD  
[facility] OFFICE (Cynthia Armstrong) [address] [place] [address]  
[phone] Friday May 04, 2018 11:00 AM EDT  
ORIENTATION with Vivien Buck RN  
[facility] ([address]) 1100 [facility] 187 [facility]  
[phone] Tuesday May 08, 2018  3:20 PM EDT Discharge Followup with Franklyn Pal MD  
1141 [facility] ([address] [address] [phone] Wednesday June 06, 2018  3:45 PM EDT Office Visit with Bi Granado MD  
[facility] INTERNAL MEDICINE ([address]) 1825 [facility]  
[phone] Discharge Orders Procedure Order Date Status Priority Quantity Spec Type Associated Dx REFERRAL TO CARDIAC [facility] 04/24/18 0851 Normal Routine 1  S/P MVR (mitral valve repair) [[id number]] A check tasneem indicates which time of day the medication should be taken. My Medications START taking these medications Instructions Each Dose to Equal  
 Morning Noon Evening Bedtime  
 acetaminophen 325 mg tablet Commonly known as:  TYLENOL Your last dose was: Your next dose is:    
   
   
 May take for pain, over the counter medicine. aspirin delayed-release 81 mg tablet Start taking on:  4/25/2018 Your last dose was: Your next dose is: Take 1 Tab by mouth daily. 81 mg  
    
   
   
   
  
 senna-docusate 8.6-50 mg per tablet Commonly known as:  Eliz Pretty Your last dose was: Your next dose is:    
   
   
 Senakot or Colace- over the counter medicine for constipation, take as directed and as needed  
     
   
   
   
  
 tapentadol 100 mg tablet Commonly known as:  Demetrio Mehta Your last dose was: Your next dose is: Take 100 mg by mouth every six (6) hours as needed for Pain.  Max Daily Amount: 400 mg.  
 100 mg  
    
   
   
   
  
  
 CHANGE how you take these medications Instructions Each Dose to Equal  
 Morning Noon Evening Bedtime  
 apixaban 5 mg tablet Commonly known as:  Kari Pillow What changed:  additional instructions Your last dose was: Your next dose is: Take 1 Tab by mouth two (2) times a day. 5 mg  
    
   
   
   
  
 sertraline 100 mg tablet Commonly known as:  ZOLOFT What changed:  when to take this Your last dose was: Your next dose is: Take 1 Tab by mouth daily. 100 mg CONTINUE taking these medications Instructions Each Dose to Equal  
 Morning Noon Evening Bedtime LORazepam 1 mg tablet Commonly known as:  ATIVAN Your last dose was: Your next dose is: Take 1 Tab by mouth two (2) times a day. Max Daily Amount: 2 mg.  
 1 mg  
    
   
   
   
  
 zolpidem 10 mg tablet Commonly known as:  AMBIEN Your last dose was: Your next dose is: Take 1 Tab by mouth nightly. Max Daily Amount: 10 mg.  
 10 mg  
    
   
   
   
  
  
STOP taking these medications BACTROBAN NASAL 2 % nasal ointment Generic drug:  mupirocin calcium ASK your doctor about these medications Instructions Each Dose to Equal  
 Morning Noon Evening Bedtime  
 dronedarone Tab tablet Commonly known as:  Caffie Lynne Your last dose was: Your next dose is: Take 1 Tab by mouth two (2) times daily (with meals). 400 mg  
    
   
   
   
  
 metoprolol succinate 25 mg XL tablet Commonly known as:  TOPROL XL Your last dose was: Your next dose is: Take 1 Tab by mouth daily. 25 mg Where to Get Your Medications Information on where to get these meds will be given to you by the nurse or doctor. ! Ask your nurse or doctor about these medications  
  acetaminophen 325 mg tablet aspirin delayed-release 81 mg tablet  
 senna-docusate 8.6-50 mg per tablet  
 tapentadol 100 mg tablet Opioid Education Prescription Opioids: What You Need to Know: 
 
 
 
F-face looks uneven A-arms unable to move or move unevenly S-speech slurred or non-existent T-time-call 911 as soon as signs and symptoms begin-DO NOT go Back to bed or wait to see if you get better-TIME IS BRAIN. Warning Signs of HEART ATTACK Call 911 if you have these symptoms: 
? Chest discomfort. Most heart attacks involve discomfort in the center of the chest that lasts more than a few minutes, or that goes away and comes back. It can feel like uncomfortable pressure, squeezing, fullness, or pain. ? Discomfort in other areas of the upper body. Symptoms can include pain or discomfort in one or both arms, the back, neck, jaw, or stomach. ? Shortness of breath with or without chest discomfort. ? Other signs may include breaking out in a cold sweat, nausea, or lightheadedness. Don't wait more than five minutes to call [address]! Fast action can save your life. Calling 911 is almost always the fastest way to get lifesaving treatment. Emergency Medical Services staff can begin treatment when they arrive  up to an hour sooner than if someone gets to the hospital by car. The discharge information has been reviewed with the patient and spouse. The patient and spouse verbalized understanding. Discharge medications reviewed with the patient and spouse and appropriate educational materials and side effects teaching were provided.  
___________________________________________________________________________ ________________________________________________________ NeGoBuY Announcement We are excited to announce that we are making your provider's discharge notes available to you in NeGoBuY. You will see these notes when they are completed and signed by the physician that discharged you from your recent hospital stay. If you have any questions or concerns about any information you see in NeGoBuY, please call the Health Information Department where you were seen or reach out to your Primary Care Provider for more information about your plan of care. Introducing [facility]! Dear Eyad Perez: Thank you for requesting a NeGoBuY account. Our records indicate that you already have an active NeGoBuY account. You can access your account anytime at [email or web address]/"[email or web address] Did you know that you can access your hospital and ER discharge instructions at any time in NeGoBuY? You can also review all of your test results from your hospital stay or ER visit. Additional Information If you have questions, please visit the Frequently Asked Questions section of the NeGoBuY website at [email or web address]/"[email or web address]/. Remember, NeGoBuY is NOT to be used for urgent needs. For medical emergencies, dial 911. Now available from your iPhone and Android! Introducing Tomás Martinez As a [facility] patient, I wanted to make you aware of our electronic visit tool called Tomás Davonkevan. [facility] 24/7 allows you to connect within minutes with a medical provider 24 hours a day, seven days a week via a mobile device or tablet or logging into a secure website from your computer. You can access Tomás Martinez from anywhere in the [place].  
 
A virtual visit might be right for you when you have a simple condition and feel like you just dont want to get out of bed, or cant get away from work for an appointment, when your regular [facility] provider is not available (evenings, weekends or holidays), or when youre out of town and need minor care. Electronic visits cost only $49 and if the [facility] 24/[employer] provider determines a prescription is needed to treat your condition, one can be electronically transmitted to a nearby pharmacy*. Please take a moment to enroll today if you have not already done so. The enrollment process is free and takes just a few minutes. To enroll, please download the Envivio martinez to your tablet or phone, or visit [email or web address] to enroll on your computer. And, as an [address] [place] [address] patient with a [employer] account, the results of your visits will be scanned into your electronic medical record and your primary care provider will be able to view the scanned results. We urge you to continue to see your regular [facility] provider for your ongoing medical care. And while your primary care provider may not be the one available when you seek a Zinitixwallyfin virtual visit, the peace of mind you get from getting a real diagnosis real time can be priceless. For more information on [employer], view our Frequently Asked Questions (FAQs) at [email or web address]. Sincerely, 
 
Rosalba Silva MD 
Chief Medical Officer [address] *:  certain medications cannot be prescribed via [employer] Providers Seen During Your Hospitalization Provider Specialty Primary office phone Audrey Penaloza MD Cardiothoracic Surgery [phone] Immunizations Administered for This Admission Name Date  
 TB Skin Test (PPD) Intradermal  Deferred (),  Deferred (), 4/19/2018 Your Primary Care Physician (PCP) Primary Care Physician Office Phone Office Fax Carmelita Che [phone] You are allergic to the following No active allergies Recent Documentation Height Weight BMI Smoking Status 1.702 m 76 kg 26.25 kg/m2 Never Smoker Emergency Contacts Name Discharge Info Relation Home Work Mobile Leonor Jeter  Spouse [3] [phone] Mel Alvarez  Daughter [21] [phone] Patient Belongings The following personal items are in your possession at time of discharge: 
  Dental Appliances: None  Visual Aid: Glasses, With patient   Hearing Aids/Status: Does not own  Home Medications: None   Jewelry: None  Clothing: Shirt, Pants, Footwear, Undergarments    Other Valuables: Eyeglasses Discharge Instructions Attachments/References CARDIAC REHABILITATION (ENGLISH) HEALTHY DIET: HEART (ENGLISH) SECONDHAND SMOKE (ENGLISH) SMOKING CESSATION: HEALTH BENEFITS: GENERAL INFO (ENGLISH) CAD (CORONARY ARTERY DISEASE): GENERAL INFO (ENGLISH) HEART ATTACK: MEDICINE TO REDUCE RISK (ENGLISH) ATRIAL FIBRILLATION: GENERAL INFO (ENGLISH) MITRAL VALVE REPAIR OR REPLACEMENT: POST-OP (ENGLISH) Patient Handouts Cardiac Rehabilitation: Care Instructions Your Care Instructions Cardiac rehabilitation is a program for people who have a heart problem, such as a heart attack, heart failure, or a heart valve disease. The program includes exercise, lifestyle changes, education, and emotional support. Cardiac rehab can help you improve the quality of your life through better overall health. It can help you lose weight and feel better about yourself. On your cardiac rehab team, you may have your doctor, a nurse specialist, a dietitian, and a physical therapist. They will design your cardiac rehab program specifically for you. You will learn how to reduce your risk for heart problems, how to manage stress, and how to eat a heart-healthy diet. By the end of the program, you will be ready to maintain a healthier lifestyle on your own. Follow-up care is a key part of your treatment and safety.  Be sure to make and go to all appointments, and call your doctor if you are having problems. It's also a good idea to know your test results and keep a list of the medicines you take. How can you care for yourself at home? · Take your medicines exactly as prescribed. Call your doctor if you think you are having a problem with your medicine. You will get more details on the specific medicines your doctor prescribes. · Weigh yourself every day if your doctor tells you to. Watch for sudden weight gain. Weigh yourself on the same scale with the same amount of clothing at the same time of day. · Plan your meals so that you are eating heart-healthy foods. ¨ Eat a variety of foods daily. Fresh fruits and vegetables and whole-grains are good choices. ¨ Limit your fat intake, especially saturated and trans fat. ¨ Limit salt (sodium). ¨ Increase fiber in your diet. ¨ Limit alcohol. · Learn how to take your pulse so that you can track your heart rate during exercise. · Always check with your doctor before you begin a new exercise program. 
· Warm up before you exercise and cool down afterward for at least 15 minutes each. This will help your heart gradually prepare for and recover from exercise and avoid pushing your heart too hard. · Stop exercising if you have any unusual discomfort, such as chest pain. · Do not smoke. Smoking can make heart problems worse. If you need help quitting, talk to your doctor about stop-smoking programs and medicines. These can increase your chances of quitting for good. When should you call for help? Call 911 anytime you think you may need emergency care. For example, call if: 
? · You have severe trouble breathing. ? · You cough up pink, foamy mucus and you have trouble breathing. ? · You have symptoms of a heart attack. These may include: ¨ Chest pain or pressure, or a strange feeling in the chest. 
¨ Sweating. ¨ Shortness of breath. ¨ Nausea or vomiting. ¨ Pain, pressure, or a strange feeling in the back, neck, jaw, or upper belly or in one or both shoulders or arms. ¨ Lightheadedness or sudden weakness. ¨ A fast or irregular heartbeat. After you call 911, the  may tell you to chew 1 adult-strength or 2 to 4 low-dose aspirin. Wait for an ambulance. Do not try to drive yourself. ? · You have angina symptoms (such as chest pain or pressure) that do not go away with rest or are not getting better within 5 minutes after you take a dose of nitroglycerin. ? · You have symptoms of a stroke. These may include: 
¨ Sudden numbness, tingling, weakness, or loss of movement in your face, arm, or leg, especially on only one side of your body. ¨ Sudden vision changes. ¨ Sudden trouble speaking. ¨ Sudden confusion or trouble understanding simple statements. ¨ Sudden problems with walking or balance. ¨ A sudden, severe headache that is different from past headaches. ? · You passed out (lost consciousness). ?Call your doctor now or seek immediate medical care if: 
? · You have new or increased shortness of breath. ? · You are dizzy or lightheaded, or you feel like you may faint. ? · You gain weight suddenly, such as more than 2 to 3 pounds in a day or 5 pounds in a week. (Your doctor may suggest a different range of weight gain.) ? · You have increased swelling in your legs, ankles, or feet. ? Watch closely for changes in your health, and be sure to contact your doctor if you have any problems. Where can you learn more? Go to http://lisa-eron.info/. Enter M456 in the search box to learn more about \"Cardiac Rehabilitation: Care Instructions. \" Current as of: September 21, 2016 Content Version: 11.4 © 6027-3756 [employer]. Care instructions adapted under license by [employer] (which disclaims liability or warranty for this information).  If you have questions about a medical condition or this instruction, always ask your healthcare professional. [address] any warranty or liability for your use of this information. Heart-Healthy Diet: Care Instructions Your Care Instructions A heart-healthy diet has lots of vegetables, fruits, nuts, beans, and whole grains, and is low in salt. It limits foods that are high in saturated fat, such as meats, cheeses, and fried foods. It may be hard to change your diet, but even small changes can lower your risk of heart attack and heart disease. Follow-up care is a key part of your treatment and safety. Be sure to make and go to all appointments, and call your doctor if you are having problems. It's also a good idea to know your test results and keep a list of the medicines you take. How can you care for yourself at home? Watch your portions · Learn what a serving is. A \"serving\" and a \"portion\" are not always the same thing. Make sure that you are not eating larger portions than are recommended. For example, a serving of pasta is ½ cup. A serving size of meat is 2 to 3 ounces. A 3-ounce serving is about the size of a deck of cards. Measure serving sizes until you are good at [place]" them. Keep in mind that restaurants often serve portions that are 2 or 3 times the size of one serving. · To keep your energy level up and keep you from feeling hungry, eat often but in smaller portions. · Eat only the number of calories you need to stay at a healthy weight. If you need to lose weight, eat fewer calories than your body burns (through exercise and other physical activity). Eat more fruits and vegetables · Eat a variety of fruit and vegetables every day. Dark green, deep orange, red, or yellow fruits and vegetables are especially good for you. Examples include spinach, carrots, peaches, and berries. · Keep carrots, celery, and other veggies handy for snacks.  Buy fruit that is in season and store it where you can see it so that you will be tempted to eat it. · Cook dishes that have a lot of veggies in them, such as stir-fries and soups. Limit saturated and trans fat · Read food labels, and try to avoid saturated and trans fats. They increase your risk of heart disease. Trans fat is found in many processed foods such as cookies and crackers. · Use olive or canola oil when you cook. Try cholesterol-lowering spreads, such as Benecol or Take Control. · Bake, broil, grill, or steam foods instead of frying them. · Choose lean meats instead of high-fat meats such as hot dogs and sausages. Cut off all visible fat when you prepare meat. · Eat fish, skinless poultry, and meat alternatives such as soy products instead of high-fat meats. Soy products, such as tofu, may be especially good for your heart. · Choose low-fat or fat-free milk and dairy products. Eat fish · Eat at least two servings of fish a week. Certain fish, such as salmon and tuna, contain omega-3 fatty acids, which may help reduce your risk of heart attack. Eat foods high in fiber · Eat a variety of grain products every day. Include whole-grain foods that have lots of fiber and nutrients. Examples of whole-grain foods include oats, whole wheat bread, and brown rice. · Buy whole-grain breads and cereals, instead of white bread or pastries. Limit salt and sodium · Limit how much salt and sodium you eat to help lower your blood pressure. · Taste food before you salt it. Add only a little salt when you think you need it. With time, your taste buds will adjust to less salt. · Eat fewer snack items, fast foods, and other high-salt, processed foods. Check food labels for the amount of sodium in packaged foods. · Choose low-sodium versions of canned goods (such as soups, vegetables, and beans). Limit sugar · Limit drinks and foods with added sugar. These include candy, desserts, and soda pop. Limit alcohol · Limit alcohol to no more than 2 drinks a day for men and 1 drink a day for women. Too much alcohol can cause health problems. When should you call for help? Watch closely for changes in your health, and be sure to contact your doctor if: 
? · You would like help planning heart-healthy meals. Where can you learn more? Go to http://lisa-eron.info/. Enter V137 in the search box to learn more about \"Heart-Healthy Diet: Care Instructions. \" Current as of: September 21, 2016 Content Version: 11.4 © 9220-7199 [employer]. Care instructions adapted under license by [employer] (which disclaims liability or warranty for this information). If you have questions about a medical condition or this instruction, always ask your healthcare professional. [address] any warranty or liability for your use of this information. Secondhand Smoke: Care Instructions Your Care Instructions Secondhand smoke comes from the burning end of a cigarette, cigar, or pipe and the smoke that a smoker exhales. The smoke contains nicotine and many other harmful chemicals. Breathing secondhand smoke can cause or worsen health problems including cancer, asthma, coronary artery disease, and respiratory infections. It can make your eyes and nose burn and cause a sore throat. Secondhand smoke is especially bad for babies and young children whose lungs are still developing. Babies whose parents smoke are more likely to have ear infections, pneumonia, and bronchitis in the first few years of their lives. Secondhand smoke can make asthma symptoms worse in children. If you are pregnant, it is important that you not smoke and that you avoid secondhand smoke. You are more likely to give birth to a baby who weighs less than expected (low birth weight) if you smoke. And your baby may have a greater risk for sudden infant death syndrome (SIDS).  Babies whose mothers are exposed to secondhand smoke during pregnancy have a higher risk for health problems. Follow-up care is a key part of your treatment and safety. Be sure to make and go to all appointments, and call your doctor if you are having problems. It's also a good idea to know your test results and keep a list of the medicines you take. How can you care for yourself at home? · Do not smoke or let anyone else smoke in your home. If people must smoke, ask them to go outside. · If people do smoke in your home, choose a room where you can open a window or use a fan to get the smoke outside. · Do not let anyone smoke in your car. If someone must smoke, pull over in a safe place and let him or her smoke away from the car. · Ask your employer to make sure that you have a smoke-free work area. · Make sure that your children are not exposed to secondhand smoke at day care, school, and after-school programs. · Try to choose nonsmoking bars, restaurants, and other public places when you go out. · Help your family and friends who smoke to quit by encouraging them to try. Tell them about treatment resources. Having support from others often helps. · If you smoke, quit. Quitting is hard, but there are ways to boost your chance of quitting tobacco for good. ¨ Use nicotine gum, patches, or lozenges. Call a quitline. Ask your doctor about stop-smoking programs and medicines. ¨ Keep trying. When should you call for help? Watch closely for changes in your health, and be sure to contact your doctor if you have any problems. Where can you learn more? Go to http://lisa-eron.info/. Enter L004 in the search box to learn more about \"Secondhand Smoke: Care Instructions. \" Current as of: March 20, 2017 Content Version: 11.4 © 3352-8662 [employer].  Care instructions adapted under license by [employer] (which disclaims liability or warranty for this information). If you have questions about a medical condition or this instruction, always ask your healthcare professional. [address] any warranty or liability for your use of this information. Learning About Benefits From Quitting Smoking How does quitting smoking make you healthier? If you're thinking about quitting smoking, you may have a few reasons to be smoke-free. Your health may be one of them. · When you quit smoking, you lower your risks for cancer, lung disease, heart attack, stroke, blood vessel disease, and blindness from macular degeneration. · When you're smoke-free, you get sick less often, and you heal faster. You are less likely to get colds, flu, bronchitis, and pneumonia. · As a nonsmoker, you may find that your mood is better and you are less stressed. When and how will you feel healthier? Quitting has real health benefits that start from day 1 of being smoke-free. And the longer you stay smoke-free, the healthier you get and the better you feel. The first hours · After just 20 minutes, your blood pressure and heart rate go down. That means there's less stress on your heart and blood vessels. · Within 12 hours, the level of carbon monoxide in your blood drops back to normal. That makes room for more oxygen. With more oxygen in your body, you may notice that you have more energy than when you smoked. After 2 weeks · Your lungs start to work better. · Your risk of heart attack starts to drop. After 1 month · When your lungs are clear, you cough less and breathe deeper, so it's easier to be active. · Your sense of taste and smell return. That means you can enjoy food more than you have since you started smoking. Over the years · After 1 year, your risk of heart disease is half what it would be if you kept smoking. · After 5 years, your risk of stroke starts to shrink.  Within a few years after that, it's about the same as if you'd never smoked. · After 10 years, your risk of dying from lung cancer is cut by about half. And your risk for many other types of cancer is lower too. How would quitting help others in your life? When you quit smoking, you improve the health of everyone who now breathes in your smoke. · Their heart, lung, and cancer risks drop, much like yours. · They are sick less. For babies and small children, living smoke-free means they're less likely to have ear infections, pneumonia, and bronchitis. · If you're a woman who is or will be pregnant someday, quitting smoking means a healthier . · Children who are close to you are less likely to become adult smokers. Where can you learn more? Go to http://lisaThe Pointeron.info/. Enter [id number] in the search box to learn more about \"Learning About Benefits From Quitting Smoking. \" Current as of: 2017 Content Version: 11.4 © 4325-5810 [employer]. Care instructions adapted under license by [employer] (which disclaims liability or warranty for this information). If you have questions about a medical condition or this instruction, always ask your healthcare professional. [place] [address] any warranty or liability for your use of this information. Learning About Coronary Artery Disease (CAD) What is coronary artery disease? Coronary artery disease (CAD) occurs when plaque builds up in the arteries that bring oxygen-rich blood to your heart. Plaque is a fatty substance made of cholesterol, calcium, and other substances in the blood. This process is called hardening of the arteries, or atherosclerosis. What happens when you have coronary artery disease? · Plaque may narrow the coronary arteries. Narrowed arteries cause poor blood flow.  This can lead to angina symptoms such as chest pain or discomfort. If blood flow is completely blocked, you could have a heart attack. · You can slow CAD and reduce the risk of future problems by making changes in your lifestyle. These include quitting smoking and eating heart-healthy foods. · Treatments for CAD, along with changes in your lifestyle, can help you live a longer and healthier life. How can you prevent coronary artery disease? · Do not smoke. It may be the best thing you can do to prevent heart disease. If you need help quitting, talk to your doctor about stop-smoking programs and medicines. These can increase your chances of quitting for good. · Be active. Get at least 30 minutes of exercise on most days of the week. Walking is a good choice. You also may want to do other activities, such as running, swimming, cycling, or playing tennis or team sports. · Eat heart-healthy foods. Eat more fruits and vegetables and less foods that contain saturated and trans fats. Limit alcohol, sodium, and sweets. · Stay at a healthy weight. Lose weight if you need to. · Manage other health problems such as diabetes, high blood pressure, and high cholesterol. · Manage stress. Stress can hurt your heart. To keep stress low, talk about your problems and feelings. Don't keep your feelings hidden. · If you have talked about it with your doctor, take a low-dose aspirin every day. Aspirin can help certain people lower their risk of a heart attack or stroke. But taking aspirin isn't right for everyone, because it can cause serious bleeding. Do not start taking daily aspirin unless your doctor knows about it. How is coronary artery disease treated? · Your doctor will suggest that you make lifestyle changes. For example, your doctor may ask you to eat healthy foods, quit smoking, lose extra weight, and be more active. · You will have to take medicines.  
· Your doctor may suggest a procedure to open narrowed or blocked arteries. This is called angioplasty. Or your doctor may suggest using healthy blood vessels to create detours around narrowed or blocked arteries. This is called bypass surgery. Follow-up care is a key part of your treatment and safety. Be sure to make and go to all appointments, and call your doctor if you are having problems. It's also a good idea to know your test results and keep a list of the medicines you take. Where can you learn more? Go to http://lisa-eron.info/. Enter [id number] in the search box to learn more about \"Learning About Coronary Artery Disease (CAD). \" Current as of: September 21, 2016 Content Version: 11.4 © 0295-0108 [employer]. Care instructions adapted under license by [employer] (which disclaims liability or warranty for this information). If you have questions about a medical condition or this instruction, always ask your healthcare professional. [place] [address] any warranty or liability for your use of this information. Reducing Heart Attack Risk With Daily Medicine: Care Instructions Your Care Instructions Heart disease is the number one cause of death. If you are at risk for heart disease, there are many medicines that can reduce your risk. These include: · ACE inhibitors. These are a type of blood pressure medicine. They can reduce the risk of heart attacks and strokes if you are at high risk. · Statin medicines. These lower cholesterol. They can also reduce the risk of heart disease and strokes. · Aspirin. It can help certain people lower their risk of a heart attack or stroke. · Beta-blocker medicines. These are a type of blood pressure and heart medicine. They can reduce the chance of early death if you have had a heart attack. All medicines can cause side effects. So it is important to understand the pros and cons of any medicine you take.  It is also important to take your medicines exactly as your doctor tells you to. Follow-up care is a key part of your treatment and safety. Be sure to make and go to all appointments, and call your doctor if you are having problems. It's also a good idea to know your test results and keep a list of the medicines you take. ACE inhibitors ACE (angiotensin-converting enzyme) inhibitors are used for three main reasons. They lower blood pressure, protect the kidneys, and prevent heart attacks and strokes. Examples include benazepril (Lotensin), lisinopril (Prinivil, Zestril), and ramipril (Altace). Before you start taking an ACE inhibitor, make sure your doctor knows if: 
· You are taking a water pill (diuretic). · You are taking potassium pills or using salt substitutes. · You are pregnant or breastfeeding. · You have had a kidney transplant or other kidney problems. ACE inhibitors can cause side effects. Call your doctor right away if you have: · Trouble breathing. · Swelling in your face, head, neck, or tongue. · Dizziness or lightheadedness. · A dry cough. Statins Statins lower cholesterol. Examples include atorvastatin (Lipitor), lovastatin (Mevacor), pravastatin (Pravachol), and simvastatin (Zocor). Before you start taking a statin, make sure your doctor knows if: 
· You have had a kidney transplant or other kidney problems. · You have liver disease. · You take any other prescription medicine, over-the-counter medicine, vitamins, supplements, or herbal remedies. · You are pregnant or breastfeeding. Statins can cause side effects. Call your doctor right away if you have: · New, severe muscle aches. · Brown urine. Aspirin Taking an aspirin every day can lower your risk for a heart attack. A heart attack occurs when a blood vessel in the heart gets blocked. When this happens, oxygen can't get to the heart muscle, and part of the heart dies. Aspirin can help prevent blood clots that can block the blood vessels. Talk to your doctor before you start taking aspirin every day. He or she may recommend that you take one low-dose aspirin (81 mg) tablet each day, with a meal and a full glass of water. Taking aspirin isn't right for everyone. This is because it can cause serious bleeding. And you may not be able to use aspirin if you: 
· Have asthma. · Have an ulcer or other stomach problem. · Take some other medicine (called a blood thinner) that prevents blood clots. · Are allergic to aspirin. Before having a surgery or procedure, tell your doctor or dentist that you take aspirin. He or she will tell you if you should stop taking aspirin beforehand. Make sure that you understand exactly what your doctor wants you to do. Aspirin can cause side effects. Call your doctor right away if you have: · Unusual bleeding or bruising. · Nausea, vomiting, or heartburn. · Black or bloody stools. Beta-blockers Beta-blockers are used for three main reasons. They lower blood pressure, relieve angina symptoms (such as chest pain or pressure), and reduce the chances of a second heart attack. They include atenolol (Tenormin), carvedilol (Coreg), and metoprolol (Lopressor). Before you start taking a beta-blocker, make sure your doctor knows if you have: · Severe asthma or frequent asthma attacks. · A very slow pulse (less than 55 beats a minute). Beta-blockers can cause side effects. Call your doctor right away if you have: · Wheezing or trouble breathing. · Dizziness or lightheadedness. · Asthma that gets worse. When should you call for help? Watch closely for changes in your health, and be sure to contact your doctor if you have any problems. Where can you learn more? Go to http://lisa-eron.info/. Enter R428 in the search box to learn more about \"Reducing Heart Attack Risk With Daily Medicine: Care Instructions. \" Current as of: September 21, 2016 Content Version: 11.4 © 1503-8672 [employer]. Care instructions adapted under license by [employer] (which disclaims liability or warranty for this information). If you have questions about a medical condition or this instruction, always ask your healthcare professional. [address] any warranty or liability for your use of this information. Learning About Atrial Fibrillation What is atrial fibrillation? Atrial fibrillation (say \"AY-tree-go xis-himl-DDC-shun\") is the most common type of irregular heartbeat (arrhythmia). Normally, the heart beats in a strong, steady rhythm. In atrial fibrillation, a problem with the heart's electrical system causes the two upper parts of the heart (the atria) to quiver, or fibrillate. Your heart rate also may be faster than normal. 
Atrial fibrillation can be dangerous because if the heartbeat isn't strong and steady, blood can collect, or pool, in the atria. And pooled blood is more likely to form clots. Clots can travel to the brain, block blood flow, and cause a stroke. Atrial fibrillation can also lead to heart failure. Treatment for atrial fibrillation helps prevent stroke and heart failure. It also helps relieve symptoms. Atrial fibrillation is often caused by another heart problem. It may happen after heart surgery. It may also be caused by other problems, such as an overactive thyroid gland or lung disease. Many people with atrial fibrillation are able to live full and active lives. What are the symptoms? Some people feel symptoms when they have episodes of atrial fibrillation. But other people don't notice any symptoms. If you have symptoms, you may feel: · A fluttering, racing, or pounding feeling in your chest called palpitations. · Weak or tired. · Dizzy or lightheaded. · Short of breath. · Chest pain. · Confused. You may notice signs of atrial fibrillation when you check your pulse. Your pulse may seem uneven or fast. 
What can you expect when you have atrial fibrillation? At first, spells of atrial fibrillation may come on suddenly and last a short time. It may go away on its own or it goes away after treatment. This is called paroxysmal atrial fibrillation. Over time, the spells may last longer and occur more often. They often don't go away on their own. How is it treated? Treatments can help you feel better and prevent future problems, especially stroke and heart failure. The main types of treatment slow the heart rate, control the heart rhythm, and help prevent stroke. Your treatment will depend on the cause of your atrial fibrillation, your symptoms, and your risk for stroke. · Heart rate treatment. Medicine may be used to slow your heart rate. Your heartbeat may still be irregular. But these medicines keep your heart from beating too fast. They may also help relieve your symptoms. · Heart rhythm treatment. Different treatments may be used to try to stop atrial fibrillation and keep it from returning. They can also relieve symptoms. These treatments include medicine, electrical cardioversion to shock the heart back to a normal rhythm, a procedure called catheter ablation, and heart surgery. · Stroke prevention. You and your doctor can decide how to lower your risk. You may decide to take a blood-thinning medicine, such as aspirin or an anticoagulant. How can you live well with it? You can live well and help manage atrial fibrillation by having a heart-healthy lifestyle. To have a heart-healthy lifestyle: · Don't smoke. · Eat heart-healthy foods. · Be active. Talk to your doctor about what type and level of exercise is safe for you. · Stay at a healthy weight. Lose weight if you need to. · Manage stress. · Avoid alcohol if it triggers symptoms. · Manage other health problems such as high blood pressure, high cholesterol, and diabetes. · Avoid getting sick from the flu. Get a flu shot every year. Where can you learn more? Go to http://lisa-eron.info/. Enter [phone] in the search box to learn more about \"Learning About Atrial Fibrillation. \" Current as of: September 21, 2016 Content Version: 11.4 © 3366-4616 [employer]. Care instructions adapted under license by [employer] (which disclaims liability or warranty for this information). If you have questions about a medical condition or this instruction, always ask your healthcare professional. [place] [address] any warranty or liability for your use of this information. Mitral Valve Repair or Replacement: What to Expect at Home Your Recovery You have had surgery to repair or replace your heart's mitral valve. Your doctor did the surgery through a cut, called an incision, in your chest. 
You will feel tired and sore for the first few weeks after surgery. You may have some brief, sharp pains on either side of your chest. Your chest, shoulders, and upper back may ache. The incision in your chest may be sore or swollen. These symptoms usually get better after 4 to 6 weeks. You will probably be able to do many of your usual activities after 4 to 6 weeks. But for at least 6 weeks, you will not be able to lift heavy objects or do activities that strain your chest or upper arm muscles. At first you may notice that you get tired easily and need to rest often. It may take 1 to 2 months to get your energy back. Some people find that they are more emotional after this surgery. You may cry easily or show emotion in ways that are unusual for you. This is common and may last for up to a year. Some people get depressed after this surgery. Talk with your doctor if you have sadness that continues or you are concerned about how you are feeling. Treatment and other support can help you feel better. Even though the surgery repaired your mitral valve, it is still important to eat a heart-healthy diet, get regular exercise, not smoke, take your heart medicines, and reduce stress. Your doctor may recommend that you work with a nurse, a dietitian, and a physical therapist to make these changes. This is sometimes called cardiac rehabilitation. This care sheet gives you a general idea about how long it will take for you to recover. But each person recovers at a different pace. Follow the steps below to get better as quickly as possible. How can you care for yourself at home? Activity ? · Rest when you feel tired. Getting enough sleep will help you recover. Try to sleep on your back while your breastbone (sternum) heals. This usually takes about 4 to 6 weeks. ? · Try to walk each day. Start by walking a little more than you did the day before. Bit by bit, increase the amount you walk. Walking boosts blood flow and helps prevent pneumonia and constipation. ? · Avoid strenuous activities, such as bicycle riding, jogging, weight lifting, or heavy aerobic exercise, until your doctor says it is okay. ? · For 3 months, avoid activities that strain your chest or upper arm muscles. This includes pushing a  or vacuum, mopping floors, or swinging a golf club or tennis racquet. ? · For at least 6 weeks, avoid lifting anything that would make you strain. This may include a child, heavy grocery bags and milk containers, a heavy briefcase or backpack, or cat litter or dog food bags. ? · Hold a pillow firmly over your chest incision when you cough or take deep breaths. This will support your chest and reduce your pain. ? · Do breathing exercises at home as instructed by your doctor. This will help prevent pneumonia. ? · Ask your doctor when you can drive again. ? · You will probably need to take 4 to 12 weeks off from work. It depends on the type of work you do and how you feel. ? · You may shower as usual. Pat the incision dry. Do not take a bath for the first 3 weeks, or until your doctor tells you it is okay. ? · Do not swim or use a hot tub for at least 1 month, or until your doctor says it is okay. ? · Ask your doctor when it is okay for you to have sex. Diet ? · Eat a heart-healthy, low-salt diet. If you have not been eating this way, talk to your doctor. You also may want to talk to a dietitian. A dietitian can help you plan meals and learn about healthy foods. ? · Drink plenty of fluids (unless your doctor tells you not to). ? · You may notice that your bowel movements are not regular right after your surgery. This is common. Try to avoid constipation and straining with bowel movements. You may want to take a fiber supplement every day. If you have not had a bowel movement after a couple of days, ask your doctor about taking a mild laxative. Medicines ? · Your doctor will tell you if and when you can restart your medicines. He or she will also give you instructions about taking any new medicines. ? · If you take blood thinners, such as warfarin (Coumadin), clopidogrel (Plavix), or aspirin, be sure to talk to your doctor. He or she will tell you if and when to start taking those medicines again. Make sure that you understand exactly what your doctor wants you to do. ? · Be safe with medicines. Take your medicines exactly as prescribed. Call your doctor if you think you are having a problem with your medicine. ? · Take pain medicines exactly as directed. ¨ If the doctor gave you a prescription medicine for pain, take it as prescribed. ¨ If you are not taking a prescription pain medicine, ask your doctor if you can take an over-the-counter medicine. ¨ Do not take aspirin, ibuprofen (Advil, Motrin), naproxen (Aleve), or other nonsteroidal anti-inflammatory drugs (NSAIDs) unless your doctor says it is okay. ? · If you think your pain medicine is making you sick to your stomach: 
¨ Take your medicine after meals (unless your doctor has told you not to). ¨ Ask your doctor for a different pain medicine. ? · If your doctor prescribed antibiotics, take them as directed. Do not stop taking them just because you feel better. You need to take the full course of antibiotics. ? · Your doctor may give you a blood thinner to prevent blood clots. If you take a blood thinner, be sure you get instructions about how to take your medicine safely. Blood thinners can cause serious bleeding problems. Incision care ? · If you have strips of tape on the incision the doctor made, leave the tape on for a week or until it falls off.  
? · Wash the area daily with warm, soapy water and pat it dry. Don't use hydrogen peroxide or alcohol, which can slow healing. You may cover the area with a gauze bandage if it weeps or rubs against clothing. Change the bandage every day. ? · Keep the area clean and dry. Other instructions ? · Keep track of your weight. Weigh yourself every day at the same time of day, on the same scale, in the same amount of clothing. A sudden increase in weight can be a sign of a problem with your heart. Tell your doctor if you suddenly gain weight, such as 3 pounds or more in 2 to 3 days. ? · Be sure to tell all your doctors and your dentist that you have had mitral valve surgery. This is important, because you may need to take antibiotics before certain procedures to prevent infection. Follow-up care is a key part of your treatment and safety. Be sure to make and go to all appointments, and call your doctor if you are having problems. It's also a good idea to know your test results and keep a list of the medicines you take. When should you call for help? Call 911 anytime you think you may need emergency care. For example, call if: 
? · You passed out (lost consciousness). ? · You have trouble breathing. ?Call your doctor now or seek immediate medical care if: 
? · You have pain that does not get better after you take pain medicine. ? · You have loose stitches, or your incision comes open. ? · You are bleeding a lot from the incision. ? · You have signs of infection, such as: 
¨ Increased pain, swelling, warmth, or redness. ¨ Red streaks leading from the incision. ¨ Pus draining from the incision. ¨ A fever. ? · You have severe pain in your leg, or it becomes cold, pale, blue, tingly, or numb. ? · You are sick to your stomach or cannot keep fluids down. ? Watch closely for changes in your health, and be sure to contact your doctor if: 
? · You do not get better as expected. Where can you learn more? Go to http://lisa-eron.info/. Enter G286 in the search box to learn more about \"Mitral Valve Repair or Replacement: What to Expect at Home. \" Current as of: September 21, 2016 Content Version: 11.4 © 5565-0848 [employer]. Care instructions adapted under license by [employer] (which disclaims liability or warranty for this information). If you have questions about a medical condition or this instruction, always ask your healthcare professional. [address] any warranty or liability for your use of this information. Please provide this summary of care documentation to your next provider. Signatures-by signing, you are acknowledging that this After Visit Summary has been reviewed with you and you have received a copy. Patient Signature:  ____________________________________________________________ Date:  ____________________________________________________________  
  
Larri Hazard Provider Signature:  ____________________________________________________________ Date:  ____________________________________________________________

## 2018-04-19 NOTE — ANESTHESIA PROCEDURE NOTES
[facility]  Date/Time: 4/19/2018 8:50 AM  Ordering Provider: Delroy Donaldson    Procedure Details: probe placement, image aquisition & interpretation    Timeout performed, 08:50  Risks and benefits discussed with the patient and plans are to proceed    Procedure Note    Performed by: Candy Hackett  Authorized by: West ARREDONDO       Indications: assessment of ascending aorta and assessment of surgical repair  Modalities: 2D, CF, CWD, PWD  Probe Type: multiplane  Insertion: atraumatic  Patient Status: intubated and sedated    Echocardiographic and Doppler Measurements   Aorta  Size  Diam(cm)  Dissection PlaqueThick(mm)  Plaque Mobile    Ascending normal  No 0-3 No    Arch         Descending normal  No 0-3 No          Valves  Annulus  Stenosis  Area/Grad  Regurg  Leaflet   Morph  Leaflet   Motion    Aortic normal    normal normal    Mitral dilated none  4+ normal flail    Tricuspid normal none  1+ normal normal          Atria  Size  SEC (smoke)  Thrombus  Tumor  Device    Rt Atrium normal No No No No    Lt Atrium dilated No No No No     Interatrial Septum Morphology: normal    Interventricular Septum Morphology: normal    Ventricle  Cavity Size  Cavity Dimension Hypertrophy  Thrombus  Gloal FXN  EF    RV normal  No no      LV dilated   No mildly impaired 50%       Regional Function  (1 = normal, 2 = mildly hypokinetic, 3 = severely hypokinetic, 4 = akinetic, 5 = dyskinetic) LAV - Long Opp View   ME LAV = 0  ME LAV = 90  ME LAV = 130   Basal Sept:2 Basal Ant:2 Basal Post:3   Mid Sept:2 Mid Ant:2 Mid Post:2   Apical Sept:2 Apical Ant:2 Basal Ant Sept:2   Basal Lat:2 Basal Inf:2 Mid Ant Sept:2   Mid Lat:2 Mid Inf:2    Apical Lat:2 Apical Inf:2        Pericardium: normal    Post Intervention Follow-up Study  Ventricular Global Function: decreased  Ventricular Regional Function: decreased     Valve  Function  Regurgitation  Area    Aortic no change 0     Mitral improved 0     Tricuspid no change 2+     Prosthetic Complications: None  Comments: All findings viewed by and discussed with surgeon  Preop: Severe MR with posterior leaflet flail. This involved primarily P2. Wall hugging, anteriorly directed jet   Post op: MV repaired and ringed. No visible MR.  Mean gradient of 3.5mmHg with no evidence of NADIRA  Post op EF markedly reduced

## 2018-04-19 NOTE — PERIOP NOTES
Patient's green blood bracelet reads Francisco Hagen. Name listed as an alias. Spoke with Ro Addison in [address]. Reassured by Ro Addison, \"it's okay. \"  Cristino Paulino, [place] charge, regarding patient's name on blood bracelet. Spoke with Nicola Avilez, Preop Supervisor and patient's RN, Sylvie Crisostomo. All agree to proceed.

## 2018-04-19 NOTE — CONSULTS
Cardiovascular ICU Consult Note: 4/19/2018  Delia Hines III  Admission Date: 4/19/2018     The patient's chart is reviewed and the patient is discussed with the staff. Subjective:     Patient is seen at the request of Dr. Joshua Benitez for respiratory management status post cardiac surgery. Patient had minimally invasive MVR ( mitral valve repair ) and a MAZE. He is currently is sedated in CV-ICU and orally intubated receiving mechanical ventilation. He was being followed by [address] [place] [address] [address] [facility] for a history of atrial arrythmias to include atrial fibrillation, flutter and tachycardia. He was being treated with Multaq and Eliquis. He was recently found to have a murmur and echo showed severe MR. Cardiac cath revealed mild to moderate CAD with an EF of 50 to 55%. He smoked for a brief time in high school. He was an active runner up until last August. Since then, he has felt too short of breath and fatigued. He has chronic anxiety and takes Ativan BID. Prior to Admission Medications   Prescriptions Last Dose Informant Patient Reported? Taking? LORazepam (ATIVAN) 1 mg tablet 4/19/2018 at Unknown time  No Yes   Sig: Take 1 Tab by mouth two (2) times a day. Max Daily Amount: 2 mg. apixaban (ELIQUIS) 5 mg tablet 4/13/2018  No No   Sig: Take 1 Tab by mouth two (2) times a day. Patient taking differently: Take 5 mg by mouth two (2) times a day. Last dose was 4/13/2018 per Dr Boone Bailon order to patient and hospital   dronedarone (Luba Sharp) tab tablet 4/19/2018 at 0430  No Yes   Sig: Take 1 Tab by mouth two (2) times daily (with meals). metoprolol succinate (TOPROL XL) 25 mg XL tablet 4/19/2018 at 0430  No Yes   Sig: Take 1 Tab by mouth daily. Patient taking differently: Take 25 mg by mouth daily. 7am   mupirocin calcium (BACTROBAN NASAL) 2 % nasal ointment 4/19/2018 at Unknown time  Yes Yes   Sig: by Both Nostrils route two (2) times a day.    sertraline (ZOLOFT) 100 mg tablet 4/18/2018 at Unknown time  No Yes   Sig: Take 1 Tab by mouth daily. Patient taking differently: Take 100 mg by mouth nightly. zolpidem (AMBIEN) 10 mg tablet 4/18/2018 at Unknown time  No Yes   Sig: Take 1 Tab by mouth nightly. Max Daily Amount: 10 mg.       Facility-Administered Medications: None       Review of Systems  A comprehensive review of systems was negative except for: Constitutional: positive for fatigue  Eyes: positive for contacts/glasses  Ears, nose, mouth, throat, and face: positive for none  Respiratory: positive for none  Cardiovascular: positive for palpitations, fatigue, dyspnea on exertion  Gastrointestinal: positive for none  Genitourinary: positive for none  Integument/breast: positive for none  Hematologic/lymphatic: positive for none  Musculoskeletal: positive for none  Neurological: positive for none  Behvioral/Psych: positive for anxiety and depression  Endocrine: positive for none  Allergic/Immunologic: positive for none    Past Medical History:   Diagnosis Date    Allergic rhinitis due to other allergen 2/25/2015    Anticoagulated 4/3/2018    Anxiety state, unspecified 2/25/2015    CAD (coronary artery disease)     mild non obstructive , awaitng Maze for mitral valve repair/replacement    Depression     started with loss of parents 2010    First degree hemorrhoids 2/20/2017    Insomnia, unspecified 2/25/2015    Lipoma 2/25/2015    Mitral valve regurgitation     Paroxysmal A-fib ([address]          Unspecified adjustment reaction 2/25/2015     Past Surgical History:   Procedure Laterality Date    ECHO TRANSESOPHAGEAL ([facility]) W OR WO CONTRAST  3/15/2018         HX HEART CATHETERIZATION  03/15/2018    HX HEENT  1996    wisdom teeth ext    HX ORTHOPAEDIC  late 1990's    cyst from left ring finger     HX WISDOM TEETH EXTRACTION       Social History     Social History    Marital status:      Spouse name: N/A    Number of children: N/A    Years of education: N/A Occupational History     at [facility] [employer]      Social History Main Topics    Smoking status: Never Smoker    Smokeless tobacco: Never Used    Alcohol use No    Drug use: No    Sexual activity: Yes     Partners: Female     Other Topics Concern    Not on file     Social History Narrative    . Lives with wife     Family History   Problem Relation Age of Onset    Alcohol abuse Mother     Heart Failure Paternal Grandfather     Coronary Artery Disease Paternal Grandfather      No Known Allergies    No current facility-administered medications for this encounter. Objective:     Vitals:    04/19/18 0557 04/19/18 0644 04/19/18 1425 04/19/18 1430   BP:  107/66  118/59   Pulse:  66 81 80   Resp:  18 9 9   Temp:  98.2 °F (36.8 °C) 97.8 °F (36.6 °C) 97.9 °F (36.6 °C)   SpO2:  98% 97% 96%   Weight: 164 lb 3.2 oz (74.5 kg)      Height: 5' 7\" (1.702 m)          Intake and Output:      04/19 0701 - 04/19 1900  In: 1100 [I.V.:1100]  Out: 1006 [Urine:986]    Physical Exam:          Constitutional:  Sedated, intubated and mechanically ventilated. HEENT:  Sclera clear, pupils equal, oral mucosa moist and orally intubated  RESPIRATORY: clear   CARDIOVASCULAR:  RRR with no M,G,R;  ABDOMEN:  soft; no bowel sounds present  EXTREMITIES:  warm with no cyanosis, no lower leg edema. SKIN:  no jaundice or ecchymosis   NEURO:  sedated. Musculoskeletal: cannot assess - sedated    CHEST XRAY:              LINES:  Mcgarry, swan arnold, arterial line. Oral ETT, oral gastric tube, chest tube(s)    DRIPS:  Diprivan    CI:  3.1    Ventilator Settings  Mode FIO2 Rate Tidal Volume Pressure PEEP   SIMV  60 %    450 ml  15 cm H2O  8 cm H20      Peak airway pressure: 21.9 cm H2O   Minute ventilation: 6.9 l/min     ABG: No results for input(s): PH, PCO2, PO2, HCO3 in the last 72 hours.      LAB  Recent Labs      04/17/18   0833   WBC  6.6   HGB  15.3   HCT  42.9   PLT  183   INR  1.1     Recent Labs      04/17/18 5544   NA  145   K  4.1   CL  109*   CO2  24   GLU  88   BUN  14   CREA  0.94   CA  9.1   ALB  4.0       Assessment and Plan :  (Medical Decision Making)     Hospital Problems  Date Reviewed: 4/19/2018          Codes Class Noted POA    Respiratory failure, post-operative ([address] ICD-10-CM: H55.443  ICD-9-CM: 518.51  4/19/2018 No        Hypoxia ICD-10-CM: R09.02  ICD-9-CM: 799.02  4/19/2018 No        Anxiety (Chronic) ICD-10-CM: F41.9  ICD-9-CM: 300.00  4/19/2018 Yes        Mitral valve prolapse ICD-10-CM: I34.1  ICD-9-CM: 424.0  4/19/2018 Yes        S/P MVR (mitral valve repair) ICD-10-CM: I92.290  ICD-9-CM: V45.89  4/19/2018 Yes        * (Principal)Mitral valve regurgitation (Chronic) ICD-10-CM: I34.0  ICD-9-CM: 424.0  Unknown Yes              Plan:     Wean vent per protocol  CXR  Follow hemodynamics  Per surgery  follow ABG and labs  Resume Ativan when alert and needed (takes BID at home)         Liset Chowdhury MD    More than 50% of time documented was spent face-to-face contact with the patient and in the care of the patient on the floor/unit where the patient is located

## 2018-04-19 NOTE — IP AVS SNAPSHOT
[address] 
[phone] Patient: Matteo Elizondo 
MRN: [id number] YYS:[birth year] A check tasneem indicates which time of day the medication should be taken. My Medications START taking these medications Instructions Each Dose to Equal  
 Morning Noon Evening Bedtime  
 acetaminophen 325 mg tablet Commonly known as:  TYLENOL Your last dose was: Your next dose is:    
   
   
 May take for pain, over the counter medicine. aspirin delayed-release 81 mg tablet Start taking on:  4/25/2018 Your last dose was: Your next dose is: Take 1 Tab by mouth daily. 81 mg  
    
   
   
   
  
 senna-docusate 8.6-50 mg per tablet Commonly known as:  Wan Hitchcock Your last dose was: Your next dose is:    
   
   
 Senakot or Colace- over the counter medicine for constipation, take as directed and as needed  
     
   
   
   
  
 tapentadol 100 mg tablet Commonly known as:  Neha Simpson Your last dose was: Your next dose is: Take 100 mg by mouth every six (6) hours as needed for Pain. Max Daily Amount: 400 mg.  
 100 mg CHANGE how you take these medications Instructions Each Dose to Equal  
 Morning Noon Evening Bedtime  
 apixaban 5 mg tablet Commonly known as:  Мария Matthew What changed:  additional instructions Your last dose was: Your next dose is: Take 1 Tab by mouth two (2) times a day. 5 mg  
    
   
   
   
  
 sertraline 100 mg tablet Commonly known as:  ZOLOFT What changed:  when to take this Your last dose was: Your next dose is: Take 1 Tab by mouth daily. 100 mg CONTINUE taking these medications Instructions Each Dose to Equal  
 Morning Noon Evening Bedtime LORazepam 1 mg tablet Commonly known as:  ATIVAN Your last dose was: Your next dose is: Take 1 Tab by mouth two (2) times a day. Max Daily Amount: 2 mg.  
 1 mg  
    
   
   
   
  
 zolpidem 10 mg tablet Commonly known as:  AMBIEN Your last dose was: Your next dose is: Take 1 Tab by mouth nightly. Max Daily Amount: 10 mg.  
 10 mg  
    
   
   
   
  
  
STOP taking these medications BACTROBAN NASAL 2 % nasal ointment Generic drug:  mupirocin calcium ASK your doctor about these medications Instructions Each Dose to Equal  
 Morning Noon Evening Bedtime  
 dronedarone Tab tablet Commonly known as:  Nathan Trejo Your last dose was: Your next dose is: Take 1 Tab by mouth two (2) times daily (with meals). 400 mg  
    
   
   
   
  
 metoprolol succinate 25 mg XL tablet Commonly known as:  TOPROL XL Your last dose was: Your next dose is: Take 1 Tab by mouth daily. 25 mg Where to Get Your Medications Information on where to get these meds will be given to you by the nurse or doctor. ! Ask your nurse or doctor about these medications  
  acetaminophen 325 mg tablet  
 aspirin delayed-release 81 mg tablet  
 senna-docusate 8.6-50 mg per tablet  
 tapentadol 100 mg tablet

## 2018-04-19 NOTE — ANESTHESIA PROCEDURE NOTES
Arterial Line Placement    Start time: 4/19/2018 8:11 AM  End time: 4/19/2018 8:15 AM  Performed by: Cheryl Gupta  Authorized by: Yaakov Galeazzi R     Pre-Procedure  Indications:  Arterial pressure monitoring and blood sampling  Preanesthetic Checklist: patient identified, risks and benefits discussed, anesthesia consent, site marked, patient being monitored, timeout performed and patient being monitored    Timeout Time: 08:11        Procedure:   Prep:  ChloraPrep  Seldinger Technique?: Yes    Orientation:  Left  Location:  Radial artery  Catheter size:  20 G  Number of attempts:  1    Assessment:   Post-procedure:  Line secured and sterile dressing applied  Patient Tolerance:  Patient tolerated the procedure well with no immediate complications  Comment:   Left arm prepped with ChloraPrep, 0.8ml of 1% lidocaine infiltrated at skin, Seldinger technique, good blood return, good waveform.

## 2018-04-20 ENCOUNTER — HOME HEALTH ADMISSION (OUTPATIENT)
Dept: HOME HEALTH SERVICES | Facility: HOME HEALTH | Age: 58
End: 2018-04-20
Payer: COMMERCIAL

## 2018-04-20 ENCOUNTER — APPOINTMENT (OUTPATIENT)
Dept: GENERAL RADIOLOGY | Age: 58
DRG: 219 | End: 2018-04-20
Attending: THORACIC SURGERY (CARDIOTHORACIC VASCULAR SURGERY)
Payer: COMMERCIAL

## 2018-04-20 PROBLEM — J98.11 ATELECTASIS: Status: ACTIVE | Noted: 2018-04-20

## 2018-04-20 LAB
ANION GAP SERPL CALC-SCNC: 8 MMOL/L (ref 7–16)
ATRIAL RATE: 81 BPM
BUN SERPL-MCNC: 18 MG/DL (ref 6–23)
CALCIUM SERPL-MCNC: 7.8 MG/DL (ref 8.3–10.4)
CALCULATED P AXIS, ECG09: 44 DEGREES
CALCULATED R AXIS, ECG10: -48 DEGREES
CALCULATED T AXIS, ECG11: -22 DEGREES
CHLORIDE SERPL-SCNC: 111 MMOL/L (ref 98–107)
CO2 SERPL-SCNC: 24 MMOL/L (ref 21–32)
CREAT SERPL-MCNC: 0.88 MG/DL (ref 0.8–1.5)
DIAGNOSIS, 93000: NORMAL
ERYTHROCYTE [DISTWIDTH] IN BLOOD BY AUTOMATED COUNT: 13.1 % (ref 11.9–14.6)
ERYTHROCYTE [DISTWIDTH] IN BLOOD BY AUTOMATED COUNT: 13.1 % (ref 11.9–14.6)
GLUCOSE BLD STRIP.AUTO-MCNC: 101 MG/DL (ref 65–100)
GLUCOSE BLD STRIP.AUTO-MCNC: 114 MG/DL (ref 65–100)
GLUCOSE BLD STRIP.AUTO-MCNC: 118 MG/DL (ref 65–100)
GLUCOSE BLD STRIP.AUTO-MCNC: 118 MG/DL (ref 65–100)
GLUCOSE BLD STRIP.AUTO-MCNC: 119 MG/DL (ref 65–100)
GLUCOSE BLD STRIP.AUTO-MCNC: 92 MG/DL (ref 65–100)
GLUCOSE SERPL-MCNC: 119 MG/DL (ref 65–100)
HCT VFR BLD AUTO: 37.8 % (ref 41.1–50.3)
HCT VFR BLD AUTO: 40.4 % (ref 41.1–50.3)
HGB BLD-MCNC: 13 G/DL (ref 13.6–17.2)
HGB BLD-MCNC: 14.2 G/DL (ref 13.6–17.2)
MAGNESIUM SERPL-MCNC: 2.2 MG/DL (ref 1.8–2.4)
MAGNESIUM SERPL-MCNC: 2.3 MG/DL (ref 1.8–2.4)
MCH RBC QN AUTO: 30.1 PG (ref 26.1–32.9)
MCH RBC QN AUTO: 30.7 PG (ref 26.1–32.9)
MCHC RBC AUTO-ENTMCNC: 34.4 G/DL (ref 31.4–35)
MCHC RBC AUTO-ENTMCNC: 35.1 G/DL (ref 31.4–35)
MCV RBC AUTO: 87.3 FL (ref 79.6–97.8)
MCV RBC AUTO: 87.5 FL (ref 79.6–97.8)
MM INDURATION POC: 0 MM (ref 0–5)
P-R INTERVAL, ECG05: 200 MS
PLATELET # BLD AUTO: 118 K/UL (ref 150–450)
PLATELET # BLD AUTO: 125 K/UL (ref 150–450)
PMV BLD AUTO: 9.7 FL (ref 10.8–14.1)
PMV BLD AUTO: 9.8 FL (ref 10.8–14.1)
POTASSIUM SERPL-SCNC: 3.8 MMOL/L (ref 3.5–5.1)
POTASSIUM SERPL-SCNC: 4.5 MMOL/L (ref 3.5–5.1)
PPD POC: NEGATIVE NEGATIVE
Q-T INTERVAL, ECG07: 434 MS
QRS DURATION, ECG06: 102 MS
QTC CALCULATION (BEZET), ECG08: 504 MS
RBC # BLD AUTO: 4.32 M/UL (ref 4.23–5.67)
RBC # BLD AUTO: 4.63 M/UL (ref 4.23–5.67)
SODIUM SERPL-SCNC: 143 MMOL/L (ref 136–145)
VENTRICULAR RATE, ECG03: 81 BPM
WBC # BLD AUTO: 12.1 K/UL (ref 4.3–11.1)
WBC # BLD AUTO: 13.2 K/UL (ref 4.3–11.1)

## 2018-04-20 PROCEDURE — 74011250637 HC RX REV CODE- 250/637: Performed by: THORACIC SURGERY (CARDIOTHORACIC VASCULAR SURGERY)

## 2018-04-20 PROCEDURE — 74011250636 HC RX REV CODE- 250/636: Performed by: THORACIC SURGERY (CARDIOTHORACIC VASCULAR SURGERY)

## 2018-04-20 PROCEDURE — 77030032994 HC SOL INJ SOD CL 0.9% LFCR 500ML

## 2018-04-20 PROCEDURE — 36592 COLLECT BLOOD FROM PICC: CPT

## 2018-04-20 PROCEDURE — 36415 COLL VENOUS BLD VENIPUNCTURE: CPT | Performed by: THORACIC SURGERY (CARDIOTHORACIC VASCULAR SURGERY)

## 2018-04-20 PROCEDURE — 74011000250 HC RX REV CODE- 250: Performed by: THORACIC SURGERY (CARDIOTHORACIC VASCULAR SURGERY)

## 2018-04-20 PROCEDURE — 84132 ASSAY OF SERUM POTASSIUM: CPT | Performed by: THORACIC SURGERY (CARDIOTHORACIC VASCULAR SURGERY)

## 2018-04-20 PROCEDURE — 74011250637 HC RX REV CODE- 250/637: Performed by: NURSE PRACTITIONER

## 2018-04-20 PROCEDURE — 82962 GLUCOSE BLOOD TEST: CPT

## 2018-04-20 PROCEDURE — 93005 ELECTROCARDIOGRAM TRACING: CPT | Performed by: THORACIC SURGERY (CARDIOTHORACIC VASCULAR SURGERY)

## 2018-04-20 PROCEDURE — 99233 SBSQ HOSP IP/OBS HIGH 50: CPT | Performed by: INTERNAL MEDICINE

## 2018-04-20 PROCEDURE — 71045 X-RAY EXAM CHEST 1 VIEW: CPT

## 2018-04-20 PROCEDURE — 83735 ASSAY OF MAGNESIUM: CPT | Performed by: THORACIC SURGERY (CARDIOTHORACIC VASCULAR SURGERY)

## 2018-04-20 PROCEDURE — 77030012390 HC DRN CHST BTL GTNG -B

## 2018-04-20 PROCEDURE — 80048 BASIC METABOLIC PNL TOTAL CA: CPT | Performed by: THORACIC SURGERY (CARDIOTHORACIC VASCULAR SURGERY)

## 2018-04-20 PROCEDURE — 77010033678 HC OXYGEN DAILY

## 2018-04-20 PROCEDURE — 65660000004 HC RM CVT STEPDOWN

## 2018-04-20 PROCEDURE — 85027 COMPLETE CBC AUTOMATED: CPT | Performed by: THORACIC SURGERY (CARDIOTHORACIC VASCULAR SURGERY)

## 2018-04-20 RX ORDER — LANOLIN ALCOHOL/MO/W.PET/CERES
400 CREAM (GRAM) TOPICAL
Status: DISCONTINUED | OUTPATIENT
Start: 2018-04-20 | End: 2018-04-24 | Stop reason: HOSPADM

## 2018-04-20 RX ORDER — LORAZEPAM 1 MG/1
1 TABLET ORAL
Status: DISCONTINUED | OUTPATIENT
Start: 2018-04-20 | End: 2018-04-24 | Stop reason: HOSPADM

## 2018-04-20 RX ORDER — ZOLPIDEM TARTRATE 5 MG/1
5 TABLET ORAL
Status: DISCONTINUED | OUTPATIENT
Start: 2018-04-20 | End: 2018-04-24 | Stop reason: HOSPADM

## 2018-04-20 RX ORDER — FAMOTIDINE 20 MG/1
20 TABLET, FILM COATED ORAL EVERY 12 HOURS
Status: DISCONTINUED | OUTPATIENT
Start: 2018-04-20 | End: 2018-04-24 | Stop reason: HOSPADM

## 2018-04-20 RX ORDER — POTASSIUM CHLORIDE 20 MEQ/1
20 TABLET, EXTENDED RELEASE ORAL
Status: DISCONTINUED | OUTPATIENT
Start: 2018-04-20 | End: 2018-04-24 | Stop reason: HOSPADM

## 2018-04-20 RX ORDER — SODIUM CHLORIDE 0.9 % (FLUSH) 0.9 %
5-10 SYRINGE (ML) INJECTION EVERY 8 HOURS
Status: DISCONTINUED | OUTPATIENT
Start: 2018-04-20 | End: 2018-04-24 | Stop reason: HOSPADM

## 2018-04-20 RX ORDER — POTASSIUM CHLORIDE 750 MG/1
10 TABLET, EXTENDED RELEASE ORAL DAILY
Status: COMPLETED | OUTPATIENT
Start: 2018-04-21 | End: 2018-04-23

## 2018-04-20 RX ORDER — POTASSIUM CHLORIDE 20 MEQ/1
40 TABLET, EXTENDED RELEASE ORAL
Status: DISCONTINUED | OUTPATIENT
Start: 2018-04-20 | End: 2018-04-24 | Stop reason: HOSPADM

## 2018-04-20 RX ORDER — MUPIROCIN 20 MG/G
OINTMENT TOPICAL EVERY 12 HOURS
Status: DISCONTINUED | OUTPATIENT
Start: 2018-04-20 | End: 2018-04-24 | Stop reason: HOSPADM

## 2018-04-20 RX ORDER — TRAMADOL HYDROCHLORIDE 50 MG/1
100 TABLET ORAL
Status: DISCONTINUED | OUTPATIENT
Start: 2018-04-20 | End: 2018-04-24 | Stop reason: HOSPADM

## 2018-04-20 RX ORDER — METOPROLOL TARTRATE 25 MG/1
12.5 TABLET, FILM COATED ORAL EVERY 12 HOURS
Status: DISCONTINUED | OUTPATIENT
Start: 2018-04-20 | End: 2018-04-22

## 2018-04-20 RX ORDER — ACETAMINOPHEN 325 MG/1
650 TABLET ORAL
Status: DISCONTINUED | OUTPATIENT
Start: 2018-04-20 | End: 2018-04-24 | Stop reason: HOSPADM

## 2018-04-20 RX ORDER — FAMOTIDINE 20 MG/1
20 TABLET, FILM COATED ORAL 2 TIMES DAILY
Status: DISCONTINUED | OUTPATIENT
Start: 2018-04-20 | End: 2018-04-20

## 2018-04-20 RX ORDER — MUPIROCIN 20 MG/G
OINTMENT TOPICAL 2 TIMES DAILY
Status: DISCONTINUED | OUTPATIENT
Start: 2018-04-20 | End: 2018-04-20

## 2018-04-20 RX ORDER — MAG HYDROX/ALUMINUM HYD/SIMETH 200-200-20
30 SUSPENSION, ORAL (FINAL DOSE FORM) ORAL
Status: DISCONTINUED | OUTPATIENT
Start: 2018-04-20 | End: 2018-04-24 | Stop reason: HOSPADM

## 2018-04-20 RX ORDER — SODIUM CHLORIDE 0.9 % (FLUSH) 0.9 %
5-10 SYRINGE (ML) INJECTION AS NEEDED
Status: DISCONTINUED | OUTPATIENT
Start: 2018-04-20 | End: 2018-04-24 | Stop reason: HOSPADM

## 2018-04-20 RX ORDER — ASPIRIN 81 MG/1
81 TABLET ORAL DAILY
Status: DISCONTINUED | OUTPATIENT
Start: 2018-04-21 | End: 2018-04-24 | Stop reason: HOSPADM

## 2018-04-20 RX ADMIN — Medication 10 ML: at 19:58

## 2018-04-20 RX ADMIN — KETOROLAC TROMETHAMINE 15 MG: 15 INJECTION, SOLUTION INTRAMUSCULAR; INTRAVENOUS at 06:13

## 2018-04-20 RX ADMIN — ASPIRIN 81 MG 81 MG: 81 TABLET ORAL at 09:54

## 2018-04-20 RX ADMIN — ACETAMINOPHEN 650 MG: 325 TABLET ORAL at 19:52

## 2018-04-20 RX ADMIN — FAMOTIDINE 20 MG: 20 TABLET ORAL at 21:17

## 2018-04-20 RX ADMIN — LORAZEPAM 1 MG: 1 TABLET ORAL at 21:19

## 2018-04-20 RX ADMIN — TRAMADOL HYDROCHLORIDE 100 MG: 50 TABLET, FILM COATED ORAL at 17:19

## 2018-04-20 RX ADMIN — MUPIROCIN: 20 OINTMENT TOPICAL at 21:19

## 2018-04-20 RX ADMIN — OXYCODONE HYDROCHLORIDE AND ACETAMINOPHEN 1 TABLET: 5; 325 TABLET ORAL at 00:23

## 2018-04-20 RX ADMIN — MUPIROCIN: 20 OINTMENT TOPICAL at 09:55

## 2018-04-20 RX ADMIN — SERTRALINE HYDROCHLORIDE 100 MG: 100 TABLET ORAL at 09:54

## 2018-04-20 RX ADMIN — Medication 2 G: at 04:39

## 2018-04-20 RX ADMIN — TRAMADOL HYDROCHLORIDE 100 MG: 50 TABLET, FILM COATED ORAL at 11:23

## 2018-04-20 RX ADMIN — PHENYLEPHRINE HYDROCHLORIDE 20 MCG/MIN: 10 INJECTION INTRAMUSCULAR; INTRAVENOUS; SUBCUTANEOUS at 06:07

## 2018-04-20 RX ADMIN — ACETAMINOPHEN 650 MG: 325 TABLET ORAL at 15:55

## 2018-04-20 RX ADMIN — Medication 10 ML: at 21:19

## 2018-04-20 RX ADMIN — TRAMADOL HYDROCHLORIDE 100 MG: 50 TABLET, FILM COATED ORAL at 22:45

## 2018-04-20 RX ADMIN — OXYCODONE HYDROCHLORIDE AND ACETAMINOPHEN 1 TABLET: 5; 325 TABLET ORAL at 04:39

## 2018-04-20 RX ADMIN — Medication 10 ML: at 06:12

## 2018-04-20 RX ADMIN — METOPROLOL TARTRATE 12.5 MG: 25 TABLET ORAL at 21:19

## 2018-04-20 RX ADMIN — FAMOTIDINE 20 MG: 10 INJECTION INTRAVENOUS at 09:54

## 2018-04-20 RX ADMIN — Medication 10 ML: at 17:19

## 2018-04-20 NOTE — PROGRESS NOTES
CV Progress Note    Admit Date: 4/19/2018    POD 1    Subjective:     Patient present conditions: Awake, Alert and Cooperative. Review of Systems   Cardiac: Vital signs stable. Lines out  Respiratory: Chest x-ray clear. Minimal chest tube drainage. extubated  Neuro: Moves all four extremities. Incision: Dry. GI: Taking liquids. Objective:     Vitals:  Blood pressure (!) 83/48, pulse 70, temperature 98.2 °F (36.8 °C), resp. rate 16, height 5' 7\" (1.702 m), weight 178 lb 2.1 oz (80.8 kg), SpO2 97 %. I/O:  04/20 0701 - 04/20 1900  In: 500 [I.V.:500]  Out: 220 [Urine:165; Drains:5]  04/18 1901 - 04/20 0700  In: 3944.8 [P.O.:1185; I.V.:2759.8]  Out: 0174 [Urine:2430; Drains:20]    Heart: No Murmur. Lung: Working with IS. Neuro: Cooperative. Incisions: Dry.     ECG/Telemetry: Unchanged from Pre-Op    Labs:  Recent Results (from the past 12 hour(s))   GLUCOSE, POC    Collection Time: 04/19/18  9:43 PM   Result Value Ref Range    Glucose (POC) 120 (H) 65 - 100 mg/dL   GLUCOSE, POC    Collection Time: 04/19/18 11:08 PM   Result Value Ref Range    Glucose (POC) 79 65 - 100 mg/dL   GLUCOSE, POC    Collection Time: 04/20/18  1:05 AM   Result Value Ref Range    Glucose (POC) 118 (H) 65 - 100 mg/dL   MAGNESIUM    Collection Time: 04/20/18  3:15 AM   Result Value Ref Range    Magnesium 2.3 1.8 - 2.4 mg/dL   METABOLIC PANEL, BASIC    Collection Time: 04/20/18  3:15 AM   Result Value Ref Range    Sodium 143 136 - 145 mmol/L    Potassium 4.5 3.5 - 5.1 mmol/L    Chloride 111 (H) 98 - 107 mmol/L    CO2 24 21 - 32 mmol/L    Anion gap 8 7 - 16 mmol/L    Glucose 119 (H) 65 - 100 mg/dL    BUN 18 6 - 23 MG/DL    Creatinine 0.88 0.8 - 1.5 MG/DL    GFR est AA >60 >60 ml/min/1.73m2    GFR est non-AA >60 >60 ml/min/1.73m2    Calcium 7.8 (L) 8.3 - 10.4 MG/DL   CBC W/O DIFF    Collection Time: 04/20/18  3:15 AM   Result Value Ref Range    WBC 12.1 (H) 4.3 - 11.1 K/uL    RBC 4.32 4.23 - 5.67 M/uL    HGB 13.0 (L) 13.6 - 17.2 g/dL HCT 37.8 (L) 41.1 - 50.3 %    MCV 87.5 79.6 - 97.8 FL    MCH 30.1 26.1 - 32.9 PG    MCHC 34.4 31.4 - 35.0 g/dL    RDW 13.1 11.9 - 14.6 %    PLATELET 909 (L) 735 - 450 K/uL    MPV 9.8 (L) 10.8 - 14.1 FL   GLUCOSE, POC    Collection Time: 04/20/18  3:19 AM   Result Value Ref Range    Glucose (POC) 119 (H) 65 - 100 mg/dL   GLUCOSE, POC    Collection Time: 04/20/18  6:17 AM   Result Value Ref Range    Glucose (POC) 101 (H) 65 - 100 mg/dL   EKG, 12 LEAD, SUBSEQUENT    Collection Time: 04/20/18  6:17 AM   Result Value Ref Range    Ventricular Rate 81 BPM    Atrial Rate 81 BPM    P-R Interval 200 ms    QRS Duration 102 ms    Q-T Interval 434 ms    QTC Calculation (Bezet) 504 ms    Calculated P Axis 44 degrees    Calculated R Axis -48 degrees    Calculated T Axis -22 degrees    Diagnosis       Normal sinus rhythm  Left axis deviation  Prolonged QT  Abnormal ECG  When compared with ECG of 19-APR-2018 15:32,  T wave inversion now evident in Inferior leads  Confirmed by Tiffanie Mccartney ([id number]) on 4/20/2018 7:12:01 AM     GLUCOSE, POC    Collection Time: 04/20/18  8:30 AM   Result Value Ref Range    Glucose (POC) 92 65 - 100 mg/dL       Assessment:     Stable. Plan transfer today      Plan/Recommendations/Medical Decision Making:     Continue present treatment    Discontinue: Chest tubes today. See orders    Nydia Alegre.  Grupo Mcallister MD

## 2018-04-20 NOTE — PROGRESS NOTES
TRANSFER - IN REPORT:    Verbal report received from Adalid on Marleny Silver III  being received from CVICU(unit) for routine progression of care      Report consisted of patients Situation, Background, Assessment and   Recommendations(SBAR). Information from the following report(s) SBAR, Procedure Summary, Intake/Output and Recent Results was reviewed with the receiving nurse. Opportunity for questions and clarification was provided. Assessment completed upon patients arrival to unit and care assumed.      Dual skin assessment performed patient had no signs of bruising, redness, and swelling

## 2018-04-20 NOTE — PROGRESS NOTES
Bedside shift change report given to Renny Galvan (oncoming nurse) by Nuno Valadez (offgoing nurse). Report included the following information SBAR, Intake/Output and MAR.

## 2018-04-20 NOTE — PROGRESS NOTES
Cardiovascular ICU Daily Pulmonary Note: 4/20/2018  Tawana Patricio III  Admission Date: 4/19/2018     Patient is seen at the request of Dr. Felix Ramirez for respiratory management status post cardiac surgery. Patient had minimally invasive MVR ( mitral valve repair ) and a MAZE. He is currently is sedated in CV-ICU and orally intubated receiving mechanical ventilation. He was being followed by [facility] for a history of atrial arrythmias to include atrial fibrillation, flutter and tachycardia. He was being treated with Multaq and Eliquis. He was recently found to have a murmur and echo showed severe MR. Cardiac cath revealed mild to moderate CAD with an EF of 50 to 55%. He smoked for a brief time in high school. He was an active runner up until last August. Since then, he has felt too short of breath and fatigued. He has chronic anxiety and takes Ativan BID. The patient's chart is reviewed and the patient is discussed with the staff. Subjective:     Extubated about 3 PM yesterday. Today on NC at 2 LPM. Still having air leak continous from one chest tube. Sore from surgery and having some right shoulder pain. No wheezing no coughing. BP lower in AM and back on pressors. Review of Systems:  -Fever  -Headaches  -Chest pain  -Dyspnea,- wheezing-, cough  -Abdominal pain, -constipation  -Leg swelling  As per above.    All other organ systems grossly normal.    Current Facility-Administered Medications   Medication Dose Route Frequency    dronedarone (MULTAQ) tablet tab 400 mg  400 mg Oral BID WITH MEALS    sertraline (ZOLOFT) tablet 100 mg  100 mg Oral DAILY    0.9% sodium chloride infusion  25 mL/hr IntraVENous CONTINUOUS    dextrose 5% - 0.45% NaCl with KCl 20 mEq/L infusion  25 mL/hr IntraVENous CONTINUOUS    sodium chloride (NS) flush 5-10 mL  5-10 mL IntraVENous Q8H    sodium chloride (NS) flush 5-10 mL  5-10 mL IntraVENous PRN    oxyCODONE-acetaminophen (PERCOCET) 5-325 mg per tablet 1 Tab  1 Tab Oral Q4H PRN    morphine injection 3-5 mg  3-5 mg IntraVENous Q1H PRN    naloxone (NARCAN) injection 0.4 mg  0.4 mg IntraVENous PRN    mupirocin (BACTROBAN) 2 % ointment   Both Nostrils BID    sodium bicarbonate 8.4 % (1 mEq/mL) injection 50 mEq  50 mEq IntraVENous PRN    EPINEPHrine (ADRENALIN) 4 mg in 0.9% sodium chloride 250 mL infusion  0.05-0.1 mcg/kg/min IntraVENous TITRATE    nitroglycerin (Tridil) 200 mcg/ml infusion   mcg/min IntraVENous TITRATE    PHENYLephrine (GENEVIEVE-SYNEPHRINE) 30 mg in 0.9% sodium chloride 250 mL infusion   mcg/min IntraVENous TITRATE    lidocaine (PF) (XYLOCAINE) 100 mg/5 mL (2 %) injection syringe  mg   mg IntraVENous ONCE PRN    metoprolol tartrate (LOPRESSOR) tablet 25 mg  25 mg Oral Q12H    ondansetron (ZOFRAN) injection 4 mg  4 mg IntraVENous Q4H PRN    insulin regular (NOVOLIN R, HUMULIN R) 100 Units in 0.9% sodium chloride 100 mL infusion  1 Units/hr IntraVENous TITRATE    dextrose (D50W) injection syrg 12.5 g  25 mL IntraVENous PRN    aspirin chewable tablet 81 mg  81 mg Oral DAILY    magnesium sulfate 1 g/100 ml IVPB (premix or compounded)  1 g IntraVENous PRN    potassium chloride 10 mEq in 50 ml IVPB  10 mEq IntraVENous PRN    midazolam (VERSED) injection 1 mg  1 mg IntraVENous Q1H PRN    propofol (DIPRIVAN) infusion  0-50 mcg/kg/min IntraVENous TITRATE    chlorhexidine (PERIDEX) 0.12 % mouthwash 10 mL  10 mL Oral BID    tuberculin injection 5 Units  5 Units IntraDERMal ONCE    famotidine (PF) (PEPCID) 20 mg in sodium chloride 0.9% 10 mL injection  20 mg IntraVENous Q12H         Objective:     Vitals:    04/20/18 0549 04/20/18 0601 04/20/18 0616 04/20/18 0631   BP: (!) 88/54 (!) 80/52 (!) 88/53 (!) 86/53   Pulse: 70 68 69 80   Resp: 18 29 21 15   Temp:       SpO2: 98% 96% 98% 99%   Weight:       Height:           Intake and Output:   04/18 0701 - 04/19 1900  In: 1100 [I.V.:1100]  Out: 1939 [PXCOS:4194]  04/19 1901 - 04/20 0700  In: 2844.8 [P.O.:1185; I.V.:1659.8]  Out: 26 [Urine:611; Drains:20]    Physical Exam:          Constitutional:  WM in bed and in no distress  HEENT:  Sclera clear, pupils equal, oral mucosa moist   RESPIRATORY: clear with chest tube  with air leak continous  CARDIOVASCULAR:  RRR with no M,G,R;  ABDOMEN:  soft; no bowel sounds present  EXTREMITIES:  warm with no cyanosis, no lower leg edema. SKIN:  no jaundice or ecchymosis   NEURO:  no focal deficits. Musculoskeletal: moving all limbs. CHEST XRAY:   Chest drains noted and lung is up.  Atelectasis of right chest              LINES:  Mcgarry,   chest tube(s)    DRIPS:   GENEVIEVE         ABG:   Recent Labs      04/19/18   1430   PH  7.31*   PCO2  42   PO2  85   HCO3  21*        LAB  Recent Labs      04/20/18 0315 04/19/18 1933 04/19/18   1442  04/17/18   0833   WBC  12.1*   --   14.8*  6.6   HGB  13.0*  14.2  15.2  15.3   HCT  37.8*  40.3*  43.1  42.9   PLT  125*   --   119*  183   INR   --    --   1.5  1.1     Recent Labs      04/20/18 0315 04/19/18 1933 04/19/18   1442  04/17/18   0833   NA  143   --   144  145   K  4.5  4.5  4.6  4.1   CL  111*   --   111*  109*   CO2  24   --   23  24   GLU  119*   --   105*  88   BUN  18   --   17  14   CREA  0.88   --   1.09  0.94   MG  2.3  2.6*  3.8*   --    CA  7.8*   --   10.1  9.1   ALB   --    --    --   4.0       Assessment and Plan :  (Medical Decision Making)     Hospital Problems  Date Reviewed: 4/19/2018          Codes Class Noted POA    Respiratory failure, post-operative ([facility] [address] [address] ICD-10-CM: R47.747  ICD-9-CM: 518.51  4/19/2018 No        Hypoxia ICD-10-CM: R09.02  ICD-9-CM: 799.02  4/19/2018 No        Anxiety (Chronic) ICD-10-CM: F41.9  ICD-9-CM: 300.00  4/19/2018 Yes        Mitral valve prolapse ICD-10-CM: I34.1  ICD-9-CM: 424.0  4/19/2018 Yes        S/P MVR (mitral valve repair) ICD-10-CM: U91.939  ICD-9-CM: V45.89  4/19/2018 Yes        * (Principal)Mitral valve regurgitation (Chronic) ICD-10-CM: I34.0  ICD-9-CM: 424.0  Unknown Yes            Chest tube with air leak  --no pneumothorax on x-ra    Atelectasis  --continue IS doing well with it. Plan:     --wean oxygen  --incentive spirometry  --pressors per PMD BP in 80's right now. --chest tube per surgery with noted air leak  --pain meds for control, appears comfortable  --ativan prn, uses at home  --continue remaining treatment.       Hakeem Samano MD    More than 50% of time documented was spent face-to-face contact with the patient and in the care of the patient on the floor/unit where the patient is located

## 2018-04-20 NOTE — PROGRESS NOTES
TRANSFER - OUT REPORT:    Verbal report given to Narinder Taveras, PORTIA and Lito Wadsworth RN on Silas Presto III  being transferred to [facility] room [id number] for routine progression of care       Report consisted of patients Situation, Background, Assessment and Recommendations(SBAR). Information from the following report(s) SBAR, Procedure Summary, MAR, Recent Results and Cardiac Rhythm NSR was reviewed with the receiving nurse. Opportunity for questions and clarification was provided.

## 2018-04-20 NOTE — PROGRESS NOTES
[address]  Face to Face Encounter    Patients Name: Selvin Morin III    YOB: 1960    Ordering Physician: Chito Clayton    Primary Diagnosis: Non-rheumatic mitral regurgitation [I34.0]  Paroxysmal atrial fibrillation [facility]) [I48.0]    Date of Face to Face:   4/20/2018                                  Face to Face Encounter findings are related to primary reason for home care:   yes. 1. I certify that the patient needs intermittent care as follows: skilled nursing care:  skilled observation/assessment, patient education  physical therapy: strengthening and stretching/ROM    2. I certify that this patient is homebound, that is: 1) patient requires the use of a None device, special transportation, or assistance of another to leave the home; or 2) patient's condition makes leaving the home medically contraindicated; and 3) patient has a normal inability to leave the home and leaving the home requires considerable and taxing effort. Patient may leave the home for infrequent and short duration for medical reasons, and occasional absences for non-medical reasons. Homebound status is due to the following functional limitations: Patient currently under activity restrictions secondary to recent surgical procedure, this hinders their ability to safely leave the home. Patient with strength deficits limiting the performance of all ADL's without caregiver assistance or the use of an assistive device. 3. I certify that this patient is under my care and that I, or a nurse practitioner or  [id number], or clinical nurse specialist, or certified nurse midwife, working with me, had a Face-to-Face Encounter that meets the physician Face-to-Face Encounter requirements. The following are the clinical findings from the [address] [place] [address] encounter that support the need for skilled services and is a summary of the encounter:     See hospital chart.       Noelia Alpers, LMSW  4/20/2018      THE FOLLOWING TO BE COMPLETED BY THE COMMUNITY PHYSICIAN:    I concur with the findings described above from the F2F encounter that this patient is homebound and in need of a skilled service. Certifying Physician: _____________________________________      Printed Certifying Physician Name: _____________________________________    Date: _________________                  5/13/2018     9:16 AM    I have personally seen and examined Francisco Abrams III with  GABBIE Murphy. I agree and confirm findings in history, physical exam, and assessment/plan as outlined above, except as noted below.     Miguelangel Cope MD

## 2018-04-20 NOTE — PROGRESS NOTES
PARTHA assessed pt s/p MVR. He and his wife just sold their home and are living with their daughter in a two level home with 3 steps at entrance. He is ADL-independent, doesn't use anything for ambulation, and doesn't have any DMEs at home. He verified his PCP and insurance as listed and doesn't have any trouble getting his medications. STR vs HH was discussed. He has chosen [facility]. PARTHA entered referral and order and notified Opal Candelario and Akbar with [facility] of the referral.    Address for [address] [place] [address] following. Care Management Interventions  PCP Verified by CM: Yes  Last Visit to PCP: 04/04/18  Mode of Transport at Discharge:  Other (see comment) (Wife)  Transition of Care Consult (CM Consult): [address]: Yes  Physical Therapy Consult: Yes  Current Support Network: Lives with Spouse, Relative's Home  Confirm Follow Up Transport: Family  Plan discussed with Pt/Family/Caregiver: Yes  Freedom of Choice Offered: Yes  Discharge Location  Discharge Placement: Home with [facility]

## 2018-04-21 LAB
ABO + RH BLD: NORMAL
BLD PROD TYP BPU: NORMAL
BLD PROD TYP BPU: NORMAL
BLOOD GROUP ANTIBODIES SERPL: NORMAL
BPU ID: NORMAL
BPU ID: NORMAL
CROSSMATCH RESULT,%XM: NORMAL
CROSSMATCH RESULT,%XM: NORMAL
GLUCOSE BLD STRIP.AUTO-MCNC: 114 MG/DL (ref 65–100)
MAGNESIUM SERPL-MCNC: 2.2 MG/DL (ref 1.8–2.4)
MM INDURATION POC: 0 MM (ref 0–5)
POTASSIUM SERPL-SCNC: 4 MMOL/L (ref 3.5–5.1)
PPD POC: NORMAL NEGATIVE
SPECIMEN EXP DATE BLD: NORMAL
STATUS OF UNIT,%ST: NORMAL
STATUS OF UNIT,%ST: NORMAL
UNIT DIVISION, %UDIV: 0
UNIT DIVISION, %UDIV: 0

## 2018-04-21 PROCEDURE — 84132 ASSAY OF SERUM POTASSIUM: CPT | Performed by: THORACIC SURGERY (CARDIOTHORACIC VASCULAR SURGERY)

## 2018-04-21 PROCEDURE — 74011250637 HC RX REV CODE- 250/637: Performed by: NURSE PRACTITIONER

## 2018-04-21 PROCEDURE — 99232 SBSQ HOSP IP/OBS MODERATE 35: CPT | Performed by: INTERNAL MEDICINE

## 2018-04-21 PROCEDURE — 65660000004 HC RM CVT STEPDOWN

## 2018-04-21 PROCEDURE — 77030012891

## 2018-04-21 PROCEDURE — 83735 ASSAY OF MAGNESIUM: CPT | Performed by: THORACIC SURGERY (CARDIOTHORACIC VASCULAR SURGERY)

## 2018-04-21 PROCEDURE — 36415 COLL VENOUS BLD VENIPUNCTURE: CPT | Performed by: THORACIC SURGERY (CARDIOTHORACIC VASCULAR SURGERY)

## 2018-04-21 PROCEDURE — 74011250637 HC RX REV CODE- 250/637: Performed by: THORACIC SURGERY (CARDIOTHORACIC VASCULAR SURGERY)

## 2018-04-21 PROCEDURE — 97161 PT EVAL LOW COMPLEX 20 MIN: CPT

## 2018-04-21 PROCEDURE — 82962 GLUCOSE BLOOD TEST: CPT

## 2018-04-21 RX ORDER — BISACODYL 5 MG
10 TABLET, DELAYED RELEASE (ENTERIC COATED) ORAL DAILY PRN
Status: DISCONTINUED | OUTPATIENT
Start: 2018-04-21 | End: 2018-04-24 | Stop reason: HOSPADM

## 2018-04-21 RX ORDER — AMOXICILLIN 250 MG
1 CAPSULE ORAL 2 TIMES DAILY
Status: DISCONTINUED | OUTPATIENT
Start: 2018-04-21 | End: 2018-04-24 | Stop reason: HOSPADM

## 2018-04-21 RX ADMIN — METOPROLOL TARTRATE 12.5 MG: 25 TABLET ORAL at 21:00

## 2018-04-21 RX ADMIN — Medication 10 ML: at 17:27

## 2018-04-21 RX ADMIN — Medication 10 ML: at 22:00

## 2018-04-21 RX ADMIN — TAPENTADOL HYDROCHLORIDE 50 MG: 50 TABLET, FILM COATED ORAL at 08:43

## 2018-04-21 RX ADMIN — MUPIROCIN: 20 OINTMENT TOPICAL at 08:47

## 2018-04-21 RX ADMIN — TAPENTADOL HYDROCHLORIDE 50 MG: 50 TABLET, FILM COATED ORAL at 10:06

## 2018-04-21 RX ADMIN — ASPIRIN 81 MG: 81 TABLET, COATED ORAL at 08:45

## 2018-04-21 RX ADMIN — ACETAMINOPHEN 650 MG: 325 TABLET ORAL at 07:12

## 2018-04-21 RX ADMIN — TAPENTADOL HYDROCHLORIDE 100 MG: 50 TABLET, FILM COATED ORAL at 22:46

## 2018-04-21 RX ADMIN — FAMOTIDINE 20 MG: 20 TABLET ORAL at 21:00

## 2018-04-21 RX ADMIN — TRAMADOL HYDROCHLORIDE 100 MG: 50 TABLET, FILM COATED ORAL at 04:21

## 2018-04-21 RX ADMIN — DRONEDARONE 400 MG: 400 TABLET, FILM COATED ORAL at 08:44

## 2018-04-21 RX ADMIN — ACETAMINOPHEN 650 MG: 325 TABLET ORAL at 00:10

## 2018-04-21 RX ADMIN — POTASSIUM CHLORIDE 10 MEQ: 10 TABLET, EXTENDED RELEASE ORAL at 08:44

## 2018-04-21 RX ADMIN — ACETAMINOPHEN 650 MG: 325 TABLET ORAL at 17:23

## 2018-04-21 RX ADMIN — FAMOTIDINE 20 MG: 20 TABLET ORAL at 08:43

## 2018-04-21 RX ADMIN — MUPIROCIN: 20 OINTMENT TOPICAL at 21:00

## 2018-04-21 RX ADMIN — Medication 10 ML: at 06:32

## 2018-04-21 RX ADMIN — SERTRALINE HYDROCHLORIDE 100 MG: 100 TABLET ORAL at 08:43

## 2018-04-21 RX ADMIN — TAPENTADOL HYDROCHLORIDE 100 MG: 50 TABLET, FILM COATED ORAL at 18:12

## 2018-04-21 RX ADMIN — STANDARDIZED SENNA CONCENTRATE AND DOCUSATE SODIUM 1 TABLET: 8.6; 5 TABLET, FILM COATED ORAL at 14:33

## 2018-04-21 RX ADMIN — Medication 10 ML: at 06:28

## 2018-04-21 RX ADMIN — DRONEDARONE 400 MG: 400 TABLET, FILM COATED ORAL at 17:23

## 2018-04-21 RX ADMIN — TRAMADOL HYDROCHLORIDE 100 MG: 50 TABLET, FILM COATED ORAL at 14:33

## 2018-04-21 RX ADMIN — STANDARDIZED SENNA CONCENTRATE AND DOCUSATE SODIUM 1 TABLET: 8.6; 5 TABLET, FILM COATED ORAL at 21:00

## 2018-04-21 NOTE — PROGRESS NOTES
Discussed with RN on staff that per PT weekend guidelines pt is requested to ambulate with Nsg staff for PM session.      Sahra Bardales, PT

## 2018-04-21 NOTE — PROGRESS NOTES
Marleny Salgado III  Admission Date: 4/19/2018             Daily Progress Note: 4/21/2018    The patient's chart is reviewed and the patient is discussed with the staff. 66 yo male with a history of anxiety, a.fib/flutter tx with Multaq and Eliquis. Recently found to have a murmur. TTE showed flail posterior MV leaflet with ruptured chord and severe MR. LHC with mild/mod CAD, 4+ MR. Patient is now s/p MV repair with MAZE 4/19/18 per Dr. Lit Calderón. Patient smoked only briefly in high school. He was an active runner up until last August. Since then, he has felt too short of breath and fatigued. Subjective:     Marginal BP. Currently on RA with o2 sat 96%. Denies cough or mucus production.   Was drawing 2500 ml on IS yesterday but having difficulty with pain control this am.     Current Facility-Administered Medications   Medication Dose Route Frequency    metoprolol tartrate (LOPRESSOR) tablet 12.5 mg  12.5 mg Oral Q12H    sodium chloride (NS) flush 5-10 mL  5-10 mL IntraVENous Q8H    sodium chloride (NS) flush 5-10 mL  5-10 mL IntraVENous PRN    alum-mag hydroxide-simeth (MYLANTA) oral suspension 30 mL  30 mL Oral Q4H PRN    acetaminophen (TYLENOL) tablet 650 mg  650 mg Oral Q4H PRN    zolpidem (AMBIEN) tablet 5 mg  5 mg Oral QHS PRN    insulin regular (NOVOLIN R, HUMULIN R) injection   SubCUTAneous AC&HS    aspirin delayed-release tablet 81 mg  81 mg Oral DAILY    magnesium oxide (MAG-OX) tablet 400 mg  400 mg Oral TID PRN    magnesium oxide (MAG-OX) tablet 400 mg  400 mg Oral QID PRN    potassium chloride (KLOR-CON) tablet 10 mEq  10 mEq Oral DAILY    potassium chloride (K-DUR, KLOR-CON) SR tablet 20 mEq  20 mEq Oral BID PRN    potassium chloride (K-DUR, KLOR-CON) SR tablet 40 mEq  40 mEq Oral BID PRN    traMADol (ULTRAM) tablet 100 mg  100 mg Oral Q6H PRN    mupirocin (BACTROBAN) 2 % ointment   Both Nostrils Q12H    famotidine (PEPCID) tablet 20 mg  20 mg Oral Q12H    LORazepam (ATIVAN) tablet 1 mg  1 mg Oral BID PRN    dronedarone (MULTAQ) tablet tab 400 mg  400 mg Oral BID WITH MEALS    sertraline (ZOLOFT) tablet 100 mg  100 mg Oral DAILY       Review of Systems  Constitutional: negative for fever, chills, sweats  Cardiovascular: negative for chest pain, palpitations, syncope, edema  Gastrointestinal:  negative for dysphagia, reflux, vomiting, diarrhea, abdominal pain, or melena  Neurologic:  negative for focal weakness, numbness, headache    Objective:     Vitals:    04/20/18 1645 04/20/18 1900 04/20/18 2300 04/21/18 0423   BP: 107/58 114/58 99/54 (!) 85/63   Pulse: 80 82 77 65   Resp: 16 17 16 18   Temp: 97.8 °F (36.6 °C) 98.8 °F (37.1 °C) 98.9 °F (37.2 °C) 98.6 °F (37 °C)   SpO2: 95% 95% 95% 96%   Weight:       Height:         Intake and Output:   04/19 1901 - 04/21 0700  In: 3944.8 [P.O.:1785; I.V.:2159.8]  Out: 2386 [Urine:2071; Drains:25]       Physical Exam:   Constitution:  the patient is well developed and in no acute distress  EENMT:  Sclera clear, pupils equal, oral mucosa moist  Respiratory: clear to auscultation bilaterally, RA  Cardiovascular:  RRR without M,G,R  Gastrointestinal: soft and non-tender; with positive bowel sounds. Musculoskeletal: warm without cyanosis. There is no lower leg edema.   Skin:  no jaundice or rashes, R flank incision with gauze and tape dressing   Neurologic: no gross neuro deficits     Psychiatric:  alert and oriented x 3    CXR:   2/20        LAB  Recent Labs      04/21/18   0630  04/20/18   2116  04/20/18   1559  04/20/18   0830  04/20/18   0617   GLUCPOC  114*  118*  114*  92  101*      Recent Labs      04/20/18   1854  04/20/18   0315  04/19/18   1933  04/19/18   1442   WBC  13.2*  12.1*   --   14.8*   HGB  14.2  13.0*  14.2  15.2   HCT  40.4*  37.8*  40.3*  43.1   PLT  118*  125*   --   119*   INR   --    --    --   1.5     Recent Labs      04/21/18   0400  04/20/18   1854  04/20/18   0315   04/19/18   1442   NA --    --   143   --   144   K  4.0  3.8  4.5   < >  4.6   CL   --    --   111*   --   111*   CO2   --    --   24   --   23   GLU   --    --   119*   --   105*   BUN   --    --   18   --   17   CREA   --    --   0.88   --   1.09   MG  2.2  2.2  2.3   < >  3.8*   CA   --    --   7.8*   --   10.1    < > = values in this interval not displayed. Recent Labs      04/19/18   1430   PH  7.31*   PCO2  42   PO2  85   HCO3  21*     No results for input(s): LCAD, LAC in the last 72 hours. Assessment:  (Medical Decision Making)     Hospital Problems  Date Reviewed: 4/21/2018          Codes Class Noted POA    Atelectasis ICD-10-CM: J98.11  ICD-9-CM: 518.0  4/20/2018 Unknown    Pain control / IS / mobilize      Res[facility] failure, [facility] ([facility] ICD-10-CM: E87.696  ICD-9-CM: 518.51  4/19/2018 No    Currently on RA      Hypoxia ICD-10-CM: R09.02  ICD-9-CM: 799.02  4/19/2018 No        Anxiety (Chronic) ICD-10-CM: F41.9  ICD-9-CM: 300.00  4/19/2018 Yes        Mitral valve prolapse ICD-10-CM: I34.1  ICD-9-CM: 424.0  4/19/2018 Yes        S/P MVR (mitral valve repair) ICD-10-CM: E43.852  ICD-9-CM: V45.89  4/19/2018 Yes        * (Principal)Mitral valve regurgitation (Chronic) ICD-10-CM: I34.0  ICD-9-CM: 424.0  Unknown Yes     4/19/18 (Dr Joelle Schofield) [facility] with quadrant resection P2, primary closure and 32 mm Granado ring annuloplasty(MVR) MINIMALLY INVASIVE. CRYOABLATION OF RIGHT INTERCOSTAL SPACES  MAZE PROCEDURE WITH CRYOABLATION            Plan:  (Medical Decision Making)     --IS  --mobilize  --pain control seems to be carlin issue this am - limiting ability to effectively participate. Medications adjusted by CT Surgery. More than 50% of the time documented was spent in face-to-face contact with the patient and in the care of the patient on the floor/unit where the patient is located.     Thuy He NP  Lungs: clear  Heart:  RRR with no Murmur/Rubs/Gallops    Additional Comments:  Is, pain control    I have spoken with and examined the patient. I agree with the above assessment and plan as documented.     Rosita Santana MD

## 2018-04-21 NOTE — PROGRESS NOTES
Subjective:   No comp[laint    Objective:     Patient Vitals for the past 8 hrs:   BP Temp Pulse Resp SpO2   18 0707 105/63 98.2 °F (36.8 °C) 72 18 94 %   18 0423 (!) 85/63 98.6 °F (37 °C) 65 18 96 %     Temp (24hrs), Av.5 °F (36.9 °C), Min:97.8 °F (36.6 °C), Max:98.9 °F (37.2 °C)        Heart:  regular rate and rhythm, S1, S2 normal, no murmur, click, rub or gallop  Lung:  clear to auscultation bilaterally   Incisions: Clean, dry, and intact    Labs:  Recent Results (from the past 24 hour(s))   GLUCOSE, POC    Collection Time: 18  8:30 AM   Result Value Ref Range    Glucose (POC) 92 65 - 100 mg/dL   GLUCOSE, POC    Collection Time: 18  3:59 PM   Result Value Ref Range    Glucose (POC) 114 (H) 65 - 100 mg/dL   MAGNESIUM    Collection Time: 18  6:54 PM   Result Value Ref Range    Magnesium 2.2 1.8 - 2.4 mg/dL   POTASSIUM    Collection Time: 18  6:54 PM   Result Value Ref Range    Potassium 3.8 3.5 - 5.1 mmol/L   CBC W/O DIFF    Collection Time: 18  6:54 PM   Result Value Ref Range    WBC 13.2 (H) 4.3 - 11.1 K/uL    RBC 4.63 4.23 - 5.67 M/uL    HGB 14.2 13.6 - 17.2 g/dL    HCT 40.4 (L) 41.1 - 50.3 %    MCV 87.3 79.6 - 97.8 FL    MCH 30.7 26.1 - 32.9 PG    MCHC 35.1 (H) 31.4 - 35.0 g/dL    RDW 13.1 11.9 - 14.6 %    PLATELET 131 (L) 931 - 450 K/uL    MPV 9.7 (L) 10.8 - 14.1 FL   GLUCOSE, POC    Collection Time: 18  9:16 PM   Result Value Ref Range    Glucose (POC) 118 (H) 65 - 100 mg/dL   PLEASE READ & DOCUMENT PPD TEST IN 24 HRS    Collection Time: 18  9:30 PM   Result Value Ref Range    PPD Negative Negative    mm Induration 0 mm   MAGNESIUM    Collection Time: 18  4:00 AM   Result Value Ref Range    Magnesium 2.2 1.8 - 2.4 mg/dL   POTASSIUM    Collection Time: 18  4:00 AM   Result Value Ref Range    Potassium 4.0 3.5 - 5.1 mmol/L   GLUCOSE, POC    Collection Time: 18  6:30 AM   Result Value Ref Range    Glucose (POC) 114 (H) 65 - 100 mg/dL Assessment:     Principal Problem:    Mitral valve regurgitation ()      Overview: 4/19/18 (Dr Lise Connors) [facility] with quadrant resection P2,       primary closure and 32 mm Granado ring annuloplasty(MVR) MINIMALLY       INVASIVE.  CRYOABLATION OF RIGHT INTERCOSTAL SPACES      MAZE PROCEDURE WITH CRYOABLATION    Active Problems:    Respiratory failure, post-operative ([address] (4/19/2018)      Hypoxia (4/19/2018)      Anxiety (4/19/2018)      Mitral valve prolapse (4/19/2018)      S/P MVR (mitral valve repair) (4/19/2018)      Atelectasis (4/20/2018)        Plan/Recommendations/Medical Decision Making:     Stable, mostly NSR with a few PAC's, ambulate    See orders

## 2018-04-21 NOTE — PROGRESS NOTES
Problem: Mobility Impaired (Adult and Pediatric)  Goal: *Acute Goals and Plan of Care (Insert Text)  LTG:  (1.)Mr. Shanti Robertson will move from supine to sit and sit to supine  in bed with MODIFIED INDEPENDENCE within 3 treatment day(s). (2.)Mr. Shanti Robertson will transfer from bed to chair and chair to bed with MODIFIED INDEPENDENCE using the least restrictive device within 3 treatment day(s). (3.)Mr. Shanti Robertson will ambulate with MODIFIED INDEPENDENCE for 500 feet with the least restrictive device within 3 treatment day(s). ________________________________________________________________________________________________      PHYSICAL THERAPY: Initial Assessment, Treatment Day: Day of Assessment, AM 4/21/2018  INPATIENT: Hospital Day: 3  Payor: Ruperto Jarrett / Plan: CLEMENTE GALLO OAP / Product Type: Commerical /      NAME/AGE/GENDER: Faith Youssef III is a 65 y.o. male   PRIMARY DIAGNOSIS: Non-rheumatic mitral regurgitation [I34.0]  Paroxysmal atrial fibrillation (HCC) [I48.0] Mitral valve regurgitation Mitral valve regurgitation  Procedure(s) (LRB):  MITRAL VALVE REPAIR (MVR) MINIMALLY INVASIVE. CRYOABLATION OF RIGHT INTERCOSTAL SPACES (N/A)  MAZE PROCEDURE WITH CRYOABLATION (N/A)  MALLORY/ANES PROBE  DR GARCIA FOR ANES (N/A)  2 Days Post-Op  ICD-10: Treatment Diagnosis:    · Difficulty in walking, Not elsewhere classified (R26.2)   Precaution/Allergies:  Review of patient's allergies indicates no known allergies. ASSESSMENT:     Mr. Shanti Robertson presents sitting in recliner upon contact and agreeable to physical therapy. Blood pressure at rest 106/58mmHg and oxygen saturation 97% on room air. He notes his pain has improved with pain medication and his breathing has improved. He ambulated 200 feet with supervision this date with no loss of balance. Blood pressure 102/52mmHg after ambulation. He would benefit from skilled physical therapy in order to restore prior level of function and prevent future impairment.    This section established at most recent assessment   PROBLEM LIST (Impairments causing functional limitations):  1. Decreased Strength  2. Decreased ADL/Functional Activities  3. Decreased Transfer Abilities  4. Decreased Ambulation Ability/Technique  5. Decreased Balance  6. Increased Pain  7. Decreased Activity Tolerance  8. Decreased Pacing Skills  9. Decreased Work Simplification/Energy Conservation Techniques  10. Increased Fatigue  11. Increased Shortness of Breath  12. Decreased [place] with Home Exercise Program   INTERVENTIONS PLANNED: (Benefits and precautions of physical therapy have been discussed with the patient.)  1. Balance Exercise  2. Bed Mobility  3. Family Education  4. Gait Training  5. Home Exercise Program (HEP)  6. Manual Therapy  7. Neuromuscular Re-education/Strengthening  8. Range of Motion (ROM)  9. Therapeutic Activites  10. Therapeutic Exercise/Strengthening  11. Transfer Training     TREATMENT PLAN: Frequency/Duration: twice daily for duration of hospital stay  Rehabilitation Potential For Stated Goals: Good     RECOMMENDED REHABILITATION/EQUIPMENT: (at time of discharge pending progress): Due to the probability of continued deficits (see above) this patient will not likely need continued skilled physical therapy after discharge. Equipment:    None at this time              HISTORY:   History of Present Injury/Illness (Reason for Referral):  Per doctors note: \"Mitral valve regurgitation ()      Overview: 4/19/18 (Dr Chito Clayton) [address] [place] [address] with quadrant resection P2,       primary closure and 32 mm Granado ring annuloplasty(MVR) MINIMALLY       INVASIVE. CRYOABLATION OF RIGHT INTERCOSTAL SPACES      MAZE PROCEDURE WITH CRYOABLATION\"  Past Medical History/Comorbidities:   Mr. Francois Sánchez  has a past medical history of Allergic rhinitis due to other allergen (2/25/2015); Anticoagulated (4/3/2018); Anxiety state, unspecified (2/25/2015); CAD (coronary artery disease); Depression;  First degree hemorrhoids (2/20/2017); Insomnia, unspecified (2/25/2015); Lipoma (2/25/2015); Mitral valve regurgitation; Paroxysmal A-fib ([address] and Unspecified adjustment reaction (2/25/2015). Mr. Светлана Lee  has a past surgical history that includes hx wisdom teeth extraction; echo transesophageal (gifty) w or wo contr (3/15/2018); hx heart catheterization (03/15/2018); hx heent (1996); and hx orthopaedic (late 1990's). Social History/Living Environment:   Home Environment: Private residence  # Steps to Enter: 3  One/Two Story Residence: One story  Living Alone: No  Support Systems: Family member(s), Child(guillermina)  Patient Expects to be Discharged to[de-identified] Private residence  Current DME Used/Available at Home: None  Prior Level of Function/Work/Activity:  Sanya Milligan III is independent with functional mobility and activities of daily living. Personal Factors:          Sex:  male        Age:  65 y.o. Number of Personal Factors/Comorbidities that affect the Plan of Care: 1-2: MODERATE COMPLEXITY   EXAMINATION:   Most Recent Physical Functioning:   Gross Assessment:                  Posture:  Posture (WDL): Exceptions to WDL  Posture Assessment: Rounded shoulders  Balance:  Sitting: Intact  Standing: Intact Bed Mobility:  Rolling: Supervision  Supine to Sit: Supervision  Sit to Supine: Supervision  Scooting: Supervision  Wheelchair Mobility:     Transfers:  Sit to Stand: Supervision  Stand to Sit: Supervision  Bed to Chair: Supervision  Gait:     Base of Support: Narrowed  Step Length: Left shortened;Right shortened  Gait Abnormalities: Decreased step clearance  Distance (ft): 200 Feet (ft)  Assistive Device: Other (comment) (no assistive device)  Ambulation - Level of Assistance: Supervision  Number of Stairs Trained: 2  Stairs - Level of Assistance: Supervision  Rail Use: Left       Body Structures Involved:  1. Heart  2. Lungs  3. Joints  4.  Muscles Body Functions Affected:  1. Sensory/Pain  2. Cardio  3. Respiratory  4. Neuromusculoskeletal  5. Movement Related Activities and Participation Affected:  1. General Tasks and Demands  2. Mobility  3. Self Care  4. Domestic Life  5. Interpersonal Interactions and Relationships  6. Community, Social and [place]   Number of elements that affect the Plan of Care: 3: MODERATE COMPLEXITY   CLINICAL PRESENTATION:   Presentation: Stable and uncomplicated: LOW COMPLEXITY   CLINICAL DECISION MAKIN[address] [place] [id number] AM-PAC 6 Clicks   Basic Mobility Inpatient Short Form  How much difficulty does the patient currently have. .. Unable A Lot A Little None   1. Turning over in bed (including adjusting bedclothes, sheets and blankets)? [] 1   [] 2   [x] 3   [] 4   2. Sitting down on and standing up from a chair with arms ( e.g., wheelchair, bedside commode, etc.)   [] 1   [] 2   [x] 3   [] 4   3. Moving from lying on back to sitting on the side of the bed? [] 1   [] 2   [x] 3   [] 4   How much help from another person does the patient currently need. .. Total A Lot A Little None   4. Moving to and from a bed to a chair (including a wheelchair)? [] 1   [] 2   [x] 3   [] 4   5. Need to walk in hospital room? [] 1   [] 2   [x] 3   [] 4   6. Climbing 3-5 steps with a railing? [] 1   [] 2   [x] 3   [] 4   © , Trustees of [address] [place], [place] [id number], under license to [employer]. All rights reserved      Score:  Initial: 18     Interpretation of Tool:  Represents activities that are increasingly more difficult (i.e. Bed mobility, Transfers, Gait). Score 24 23 22-20 19-15 14-10 9-7 6     Modifier CH CI CJ CK CL CM CN      ?  Mobility - Walking and Moving Around:     - CURRENT STATUS: CK - 40%-59% impaired, limited or restricted    - GOAL STATUS: CJ - 20%-39% impaired, limited or restricted    - D/C STATUS:  ---------------To be determined---------------  Payor: SABINO / Plan: CLEMENTE GALLO OAP / Product Type: Commerical / Medical Necessity:     · Patient is expected to demonstrate progress in strength, balance, coordination and functional technique to increase independence with pain free functional mobility. · Patient demonstrates good rehab potential due to higher previous functional level. Reason for Services/Other Comments:  · Patient continues to require skilled intervention due to medical complications and decreased activity tolerance with functional mobility. Use of outcome tool(s) and clinical judgement create a POC that gives a: Clear prediction of patient's progress: LOW COMPLEXITY            TREATMENT:   (In addition to Assessment/Re-Assessment sessions the following treatments were rendered)   Pre-treatment Symptoms/Complaints:  Dayton Pike III notes minimal complaints of pain - notes significantly improved compared to last night   Pain: Initial: no number given     Post Session:  No change in pain level      Assessment/Reassessment only, no treatment provided today    Braces/Orthotics/Lines/Etc:   · O2 Device: Room air  Treatment/Session Assessment:    · Response to Treatment:  Dayton Pike III's blood pressure 102/52mmHg after ambulating 200 feet   · Interdisciplinary Collaboration:   o Physical Therapist  o Registered Nurse  · After treatment position/precautions:   o Up in chair  o Bed/Chair-wheels locked  o Call light within reach  o RN notified  o Family at bedside   · Compliance with Program/Exercises: Will assess as treatment progresses. · Recommendations/Intent for next treatment session: \"Next visit will focus on advancements to more challenging activities and reduction in assistance provided\".   Total Treatment Duration:  PT Patient Time In/Time Out  Time In: 1035  Time Out: [address], PT

## 2018-04-22 PROBLEM — I44.1 MOBITZ (TYPE) I (WENCKEBACH'S) ATRIOVENTRICULAR BLOCK: Status: ACTIVE | Noted: 2018-04-22

## 2018-04-22 PROBLEM — I44.0 FIRST DEGREE AV BLOCK: Status: ACTIVE | Noted: 2018-04-22

## 2018-04-22 LAB
MAGNESIUM SERPL-MCNC: 2.2 MG/DL (ref 1.8–2.4)
MM INDURATION POC: 0 MM (ref 0–5)
POTASSIUM SERPL-SCNC: 4 MMOL/L (ref 3.5–5.1)
PPD POC: NORMAL NEGATIVE

## 2018-04-22 PROCEDURE — 36415 COLL VENOUS BLD VENIPUNCTURE: CPT | Performed by: THORACIC SURGERY (CARDIOTHORACIC VASCULAR SURGERY)

## 2018-04-22 PROCEDURE — 83735 ASSAY OF MAGNESIUM: CPT | Performed by: THORACIC SURGERY (CARDIOTHORACIC VASCULAR SURGERY)

## 2018-04-22 PROCEDURE — 97530 THERAPEUTIC ACTIVITIES: CPT

## 2018-04-22 PROCEDURE — 74011250637 HC RX REV CODE- 250/637: Performed by: THORACIC SURGERY (CARDIOTHORACIC VASCULAR SURGERY)

## 2018-04-22 PROCEDURE — 65660000004 HC RM CVT STEPDOWN

## 2018-04-22 PROCEDURE — 99232 SBSQ HOSP IP/OBS MODERATE 35: CPT | Performed by: INTERNAL MEDICINE

## 2018-04-22 PROCEDURE — 77030012891

## 2018-04-22 PROCEDURE — 84132 ASSAY OF SERUM POTASSIUM: CPT | Performed by: THORACIC SURGERY (CARDIOTHORACIC VASCULAR SURGERY)

## 2018-04-22 RX ORDER — FUROSEMIDE 40 MG/1
40 TABLET ORAL ONCE
Status: DISCONTINUED | OUTPATIENT
Start: 2018-04-22 | End: 2018-04-22

## 2018-04-22 RX ORDER — FUROSEMIDE 40 MG/1
40 TABLET ORAL ONCE
Status: COMPLETED | OUTPATIENT
Start: 2018-04-23 | End: 2018-04-23

## 2018-04-22 RX ADMIN — FAMOTIDINE 20 MG: 20 TABLET ORAL at 20:43

## 2018-04-22 RX ADMIN — STANDARDIZED SENNA CONCENTRATE AND DOCUSATE SODIUM 1 TABLET: 8.6; 5 TABLET, FILM COATED ORAL at 20:43

## 2018-04-22 RX ADMIN — STANDARDIZED SENNA CONCENTRATE AND DOCUSATE SODIUM 1 TABLET: 8.6; 5 TABLET, FILM COATED ORAL at 09:14

## 2018-04-22 RX ADMIN — Medication 10 ML: at 15:42

## 2018-04-22 RX ADMIN — FAMOTIDINE 20 MG: 20 TABLET ORAL at 09:00

## 2018-04-22 RX ADMIN — TAPENTADOL HYDROCHLORIDE 100 MG: 50 TABLET, FILM COATED ORAL at 04:12

## 2018-04-22 RX ADMIN — TAPENTADOL HYDROCHLORIDE 100 MG: 50 TABLET, FILM COATED ORAL at 21:22

## 2018-04-22 RX ADMIN — ACETAMINOPHEN 650 MG: 325 TABLET ORAL at 15:42

## 2018-04-22 RX ADMIN — Medication 10 ML: at 05:03

## 2018-04-22 RX ADMIN — APIXABAN 5 MG: 5 TABLET, FILM COATED ORAL at 15:42

## 2018-04-22 RX ADMIN — TAPENTADOL HYDROCHLORIDE 100 MG: 50 TABLET, FILM COATED ORAL at 09:12

## 2018-04-22 RX ADMIN — LORAZEPAM 1 MG: 1 TABLET ORAL at 18:20

## 2018-04-22 RX ADMIN — POTASSIUM CHLORIDE 10 MEQ: 10 TABLET, EXTENDED RELEASE ORAL at 09:14

## 2018-04-22 RX ADMIN — ASPIRIN 81 MG: 81 TABLET, COATED ORAL at 09:14

## 2018-04-22 RX ADMIN — MUPIROCIN: 20 OINTMENT TOPICAL at 09:18

## 2018-04-22 RX ADMIN — Medication 10 ML: at 09:24

## 2018-04-22 RX ADMIN — DRONEDARONE 400 MG: 400 TABLET, FILM COATED ORAL at 09:13

## 2018-04-22 RX ADMIN — TAPENTADOL HYDROCHLORIDE 100 MG: 50 TABLET, FILM COATED ORAL at 15:41

## 2018-04-22 RX ADMIN — ZOLPIDEM TARTRATE 5 MG: 5 TABLET ORAL at 21:47

## 2018-04-22 RX ADMIN — SERTRALINE HYDROCHLORIDE 100 MG: 100 TABLET ORAL at 09:14

## 2018-04-22 RX ADMIN — MUPIROCIN: 20 OINTMENT TOPICAL at 21:00

## 2018-04-22 RX ADMIN — Medication 10 ML: at 21:00

## 2018-04-22 RX ADMIN — APIXABAN 5 MG: 5 TABLET, FILM COATED ORAL at 20:43

## 2018-04-22 NOTE — PROGRESS NOTES
Problem: Mobility Impaired (Adult and Pediatric)  Goal: *Acute Goals and Plan of Care (Insert Text)  LTG:  (1.)Mr. Ian kirk will move from supine to sit and sit to supine  in bed with MODIFIED INDEPENDENCE within 3 treatment day(s). (2.)Mr. Ian kirk will transfer from bed to chair and chair to bed with MODIFIED INDEPENDENCE using the least restrictive device within 3 treatment day(s). (3.)Mr. Ian kirk will ambulate with MODIFIED INDEPENDENCE for 500 feet with the least restrictive device within 3 treatment day(s). ________________________________________________________________________________________________      PHYSICAL THERAPY: Daily Note, Treatment Day: 1st, AM 4/22/2018  INPATIENT: Hospital Day: 4  Payor: Daren Redd / Plan: CLEMENTE GALLO OAP / Product Type: Commerical /      NAME/AGE/GENDER: Cristina Goodwin III is a 65 y.o. male   PRIMARY DIAGNOSIS: Non-rheumatic mitral regurgitation [I34.0]  Paroxysmal atrial fibrillation (HCC) [I48.0] Mitral valve regurgitation Mitral valve regurgitation  Procedure(s) (LRB):  MITRAL VALVE REPAIR (MVR) MINIMALLY INVASIVE. CRYOABLATION OF RIGHT INTERCOSTAL SPACES (N/A)  MAZE PROCEDURE WITH CRYOABLATION (N/A)  MALLORY/ANES PROBE  DR GARCIA FOR ANES (N/A)  3 Days Post-Op  ICD-10: Treatment Diagnosis:    · Difficulty in walking, Not elsewhere classified (R26.2)   Precaution/Allergies:  Review of patient's allergies indicates no known allergies. ASSESSMENT:     Mr. Ian kirk presents sitting in recliner. Ambulated 500 ft without AD with SBA/Supervision. Negotiated 2x2 stairs with one handrail and Supervision. Will continue to follow to address goals and maximize function. Per pt and RN, pt has been ambulating with SBA of wife and Supervision of [facility] staff up to 500 ft x 2 already this morning. Discussed with pt that due to PT staffing constraints pt is to ambulate one additional time this afternoon with SBA of wife and Supervision of [facility] staff.    This section established at most recent assessment   PROBLEM LIST (Impairments causing functional limitations):  1. Decreased Strength  2. Decreased ADL/Functional Activities  3. Decreased Transfer Abilities  4. Decreased Ambulation Ability/Technique  5. Decreased Balance  6. Increased Pain  7. Decreased Activity Tolerance  8. Decreased Pacing Skills  9. Decreased Work Simplification/Energy Conservation Techniques  10. Increased Fatigue  11. Increased Shortness of Breath  12. Decreased [place] with Home Exercise Program   INTERVENTIONS PLANNED: (Benefits and precautions of physical therapy have been discussed with the patient.)  1. Balance Exercise  2. Bed Mobility  3. Family Education  4. Gait Training  5. Home Exercise Program (HEP)  6. Manual Therapy  7. Neuromuscular Re-education/Strengthening  8. Range of Motion (ROM)  9. Therapeutic Activites  10. Therapeutic Exercise/Strengthening  11. Transfer Training     TREATMENT PLAN: Frequency/Duration: twice daily for duration of hospital stay  Rehabilitation Potential For Stated Goals: Good     RECOMMENDED REHABILITATION/EQUIPMENT: (at time of discharge pending progress): Due to the probability of continued deficits (see above) this patient will not likely need continued skilled physical therapy after discharge. Equipment:    None at this time              HISTORY:   History of Present Injury/Illness (Reason for Referral):  Per doctors note: \"Mitral valve regurgitation ()      Overview: 4/19/18 (Dr Dacia Mcnair) [address] [place] [address] with quadrant resection P2,       primary closure and 32 mm Granado ring annuloplasty(MVR) MINIMALLY       INVASIVE. CRYOABLATION OF RIGHT INTERCOSTAL SPACES      MAZE PROCEDURE WITH CRYOABLATION\"  Past Medical History/Comorbidities:   Mr. Concepcion Pettit  has a past medical history of Allergic rhinitis due to other allergen (2/25/2015); Anticoagulated (4/3/2018); Anxiety state, unspecified (2/25/2015); CAD (coronary artery disease); Depression;  First degree hemorrhoids (2/20/2017); Insomnia, unspecified (2/25/2015); Lipoma (2/25/2015); Mitral valve regurgitation; Paroxysmal A-fib ([address] and Unspecified adjustment reaction (2/25/2015). Mr. Juan Rodriguez  has a past surgical history that includes hx wisdom teeth extraction; echo transesophageal (gifty) w or wo contr (3/15/2018); hx heart catheterization (03/15/2018); hx heent (1996); and hx orthopaedic (late 1990's). Social History/Living Environment:   Home Environment: Private residence  # Steps to Enter: 3  One/Two Story Residence: One story  Living Alone: No  Support Systems: Family member(s), Child(guillermina)  Patient Expects to be Discharged to[de-identified] Private residence  Current DME Used/Available at Home: None  Prior Level of Function/Work/Activity:  Brittanie Richard III is independent with functional mobility and activities of daily living. Personal Factors:          Sex:  male        Age:  65 y.o. Number of Personal Factors/Comorbidities that affect the Plan of Care: 1-2: MODERATE COMPLEXITY   EXAMINATION:   Most Recent Physical Functioning:   Gross Assessment:                  Posture:     Balance:    Bed Mobility:     Wheelchair Mobility:     Transfers:  Sit to Stand: Supervision  Stand to Sit: Supervision  Gait:     Distance (ft): 500 Feet (ft)  Assistive Device:  (without AD)  Ambulation - Level of Assistance: Supervision;Stand-by assistance  Number of Stairs Trained: 4 (2x2)  Stairs - Level of Assistance: Supervision  Rail Use: Left       Body Structures Involved:  1. Heart  2. Lungs  3. Joints  4. Muscles Body Functions Affected:  1. Sensory/Pain  2. Cardio  3. Respiratory  4. Neuromusculoskeletal  5. Movement Related Activities and Participation Affected:  1. General Tasks and Demands  2. Mobility  3. Self Care  4. Domestic Life  5. Interpersonal Interactions and Relationships  6.  Community, Social and [place]   Number of elements that affect the Plan of Care: 3: MODERATE COMPLEXITY   CLINICAL PRESENTATION: Presentation: Stable and uncomplicated: LOW COMPLEXITY   CLINICAL DECISION MAKING:   Galilea Botello AM-PAC 6 Clicks   Basic Mobility Inpatient Short Form  How much difficulty does the patient currently have. .. Unable A Lot A Little None   1. Turning over in bed (including adjusting bedclothes, sheets and blankets)? [] 1   [] 2   [x] 3   [] 4   2. Sitting down on and standing up from a chair with arms ( e.g., wheelchair, bedside commode, etc.)   [] 1   [] 2   [x] 3   [] 4   3. Moving from lying on back to sitting on the side of the bed? [] 1   [] 2   [x] 3   [] 4   How much help from another person does the patient currently need. .. Total A Lot A Little None   4. Moving to and from a bed to a chair (including a wheelchair)? [] 1   [] 2   [x] 3   [] 4   5. Need to walk in hospital room? [] 1   [] 2   [x] 3   [] 4   6. Climbing 3-5 steps with a railing? [] 1   [] 2   [x] 3   [] 4   © 2007, Trustees of Galilea Botello, under license to [employer]. All rights reserved      Score:  Initial: 18     Interpretation of Tool:  Represents activities that are increasingly more difficult (i.e. Bed mobility, Transfers, Gait). Score 24 23 22-20 19-15 14-10 9-7 6     Modifier CH CI CJ CK CL CM CN      ? Mobility - Walking and Moving Around:     - CURRENT STATUS: CK - 40%-59% impaired, limited or restricted    - GOAL STATUS: CJ - 20%-39% impaired, limited or restricted    - D/C STATUS:  ---------------To be determined---------------  Payor: SABINO / Plan: CLEMENTE GALLO OAP / Product Type: Commerical /      Medical Necessity:     · Patient is expected to demonstrate progress in strength, balance, coordination and functional technique to increase independence with pain free functional mobility. · Patient demonstrates good rehab potential due to higher previous functional level.   Reason for Services/Other Comments:  · Patient continues to require skilled intervention due to medical complications and decreased activity tolerance with functional mobility. Use of outcome tool(s) and clinical judgement create a POC that gives a: Clear prediction of patient's progress: LOW COMPLEXITY            TREATMENT:   (In addition to Assessment/Re-Assessment sessions the following treatments were rendered)   Pre-treatment Symptoms/Complaints:  Feeling good. Pain: Initial: no number given  Pain Intensity 1: 0  Post Session:  No change in pain level      Therapeutic Activity: (    11): Therapeutic activities including Chair transfers, Ambulation on level ground and Stairs to improve mobility, strength and balance. Braces/Orthotics/Lines/Etc:   · O2 Device: Room air  Treatment/Session Assessment:    · Response to Treatment: Resting comfortably in recliner  · Interdisciplinary Collaboration:   o Physical Therapist  o Registered Nurse  · After treatment position/precautions:   o Up in chair  o Bed/Chair-wheels locked  o Call light within reach  o Family at bedside   · Compliance with Program/Exercises: Will assess as treatment progresses. · Recommendations/Intent for next treatment session: \"Next visit will focus on advancements to more challenging activities and reduction in assistance provided\".   Total Treatment Duration:  PT Patient Time In/Time Out  Time In: [address]. [address].  Time Out: 83[address] [facility],

## 2018-04-22 NOTE — CONSULTS
[facility] Cardiology Consult                Date of  Admission: 4/19/2018  5:27 AM     Primary Care Physician: Dr. Humberto Pemberton  Primary Cardiologist: Dr. Indira Talbert   Referring Physician: Dr. Rigoberto Quarles Physician: Dr. Sandra Giordano    CC/Reason for consult: first and second degree block      Wilfrid Carter III is a 65 y.o. male with prior h/o mod CAD with dilated LV and low normal LVEF and severe MR now s/p minimally invasive MVR ( mitral valve repair with quadrant resection P2, primary closure and 32 mm Granado ring ) and a MAZE, PAF/flutter prior on multaq and eliquis. Cardiology was consulted for first and second degree block. Reviewed tele monitor and first degree, second degree mobitz type I and possible II noted. Currently denies any chest pain, SOB, heart palpitations, dizziness or syncope.          Diagnosis    Allergic rhinitis due to other allergen    Insomnia    Mild depression (HCC)    Paroxysmal atrial fibrillation (HCC)    Mitral valve regurgitation    Anticoagulated    Respiratory failure, post-operative (HCC)    Hypoxia    Anxiety    Mitral valve prolapse    S/P MVR (mitral valve repair)    Atelectasis       Past Medical History:   Diagnosis Date    Allergic rhinitis due to other allergen 2/25/2015    Anticoagulated 4/3/2018    Anxiety state, unspecified 2/25/2015    CAD (coronary artery disease)     mild non obstructive , awaitng Maze for mitral valve repair/replacement    Depression     started with loss of parents 2010    First degree hemorrhoids 2/20/2017    Insomnia, unspecified 2/25/2015    Lipoma 2/25/2015    Mitral valve regurgitation     Paroxysmal A-fib ([address]          Unspecified adjustment reaction 2/25/2015      Past Surgical History:   Procedure Laterality Date    ECHO TRANSESOPHAGEAL (MALLORY) W OR WO CONTRAST  3/15/2018         HX HEART CATHETERIZATION  03/15/2018    HX HEENT  1996    wisdom teeth ext    HX ORTHOPAEDIC  late 1990's    cyst from left ring finger  HX WISDOM TEETH EXTRACTION       No Known Allergies   Family History   Problem Relation Age of Onset    Alcohol abuse Mother     Heart Failure Paternal Grandfather     Coronary Artery Disease Paternal Grandfather         Current Facility-Administered Medications   Medication Dose Route Frequency    apixaban (ELIQUIS) tablet 5 mg  5 mg Oral Q12H    tapentadol (NUCYNTA) tablet 100 mg  100 mg Oral Q4H PRN    senna-docusate (PERICOLACE) 8.6-50 mg per tablet 1 Tab  1 Tab Oral BID    bisacodyl (DULCOLAX) tablet 10 mg  10 mg Oral DAILY PRN    metoprolol tartrate (LOPRESSOR) tablet 12.5 mg  12.5 mg Oral Q12H    sodium chloride (NS) flush 5-10 mL  5-10 mL IntraVENous Q8H    sodium chloride (NS) flush 5-10 mL  5-10 mL IntraVENous PRN    alum-mag hydroxide-simeth (MYLANTA) oral suspension 30 mL  30 mL Oral Q4H PRN    acetaminophen (TYLENOL) tablet 650 mg  650 mg Oral Q4H PRN    zolpidem (AMBIEN) tablet 5 mg  5 mg Oral QHS PRN    aspirin delayed-release tablet 81 mg  81 mg Oral DAILY    magnesium oxide (MAG-OX) tablet 400 mg  400 mg Oral TID PRN    magnesium oxide (MAG-OX) tablet 400 mg  400 mg Oral QID PRN    potassium chloride (KLOR-CON) tablet 10 mEq  10 mEq Oral DAILY    potassium chloride (K-DUR, KLOR-CON) SR tablet 20 mEq  20 mEq Oral BID PRN    potassium chloride (K-DUR, KLOR-CON) SR tablet 40 mEq  40 mEq Oral BID PRN    traMADol (ULTRAM) tablet 100 mg  100 mg Oral Q6H PRN    mupirocin (BACTROBAN) 2 % ointment   Both Nostrils Q12H    famotidine (PEPCID) tablet 20 mg  20 mg Oral Q12H    LORazepam (ATIVAN) tablet 1 mg  1 mg Oral BID PRN    dronedarone (MULTAQ) tablet tab 400 mg  400 mg Oral BID WITH MEALS    sertraline (ZOLOFT) tablet 100 mg  100 mg Oral DAILY       ROS     Physical Exam  Vitals:    04/22/18 0419 04/22/18 0428 04/22/18 0720 04/22/18 1049   BP: 112/57  112/61 128/58   Pulse: (!) 59  62 68   Resp: 16  18 18   Temp: 97.8 °F (36.6 °C)  98.9 °F (37.2 °C) (!) 100.5 °F (38.1 °C) SpO2: 94%  97% 96%   Weight:  77.6 kg (171 lb 1.6 oz)     Height:           Physical Exam:  General: Well Developed, Well Nourished, No Acute Distress  HEENT: pupils equal and round, no abnormalities noted  Neck: supple, no JVD, no carotid bruits  Heart: S1S2 with RRR without murmurs or gallops  Lungs: Clear throughout auscultation bilaterally without adventitious sounds  Abd: soft, nontender, nondistended, with good bowel sounds  Ext: warm, no edema, calves supple/nontender, pulses 2+ bilaterally  Skin: warm and dry  Psychiatric: Normal mood and affect  Neurologic: Alert and oriented X 3    Cardiographics    Telemetry:   first degree, second degree mobitz type I and possible II noted. ECG: Normal sinus rhythm Left axis deviation Prolonged QT   Echocardiogram: preserved LV function pre op     Labs:   Recent Labs      04/22/18   0417  04/21/18   0400  04/20/18   1854  04/20/18   0315   04/19/18   1442   NA   --    --    --   143   --   144   K  4.0  4.0  3.8  4.5   < >  4.6   MG  2.2  2.2  2.2  2.3   < >  3.8*   BUN   --    --    --   18   --   17   CREA   --    --    --   0.88   --   1.09   GLU   --    --    --   119*   --   105*   WBC   --    --   13.2*  12.1*   --   14.8*   HGB   --    --   14.2  13.0*   < >  15.2   HCT   --    --   40.4*  37.8*   < >  43.1   PLT   --    --   118*  125*   --   119*   INR   --    --    --    --    --   1.5    < > = values in this interval not displayed. Assessment/Plan:     Assessment:      Principal Problem:    Mitral valve regurgitation ()- per CTS; on asa. BB held this am with AV blocks but remains on multaq for his PAF. Overview: 4/19/18 (Dr Rebekah Rios) [address] [place] [address] with quadrant resection P2,       primary closure and 32 mm Granado ring annuloplasty(MVR) MINIMALLY       INVASIVE.  CRYOABLATION OF RIGHT INTERCOSTAL SPACES      MAZE PROCEDURE WITH CRYOABLATION    Active Problems:    Respiratory failure, post-operative ([address] (4/19/2018)      Hypoxia (4/19/2018)      Anxiety (4/19/2018)      Mitral valve prolapse (4/19/2018)      S/P MVR (mitral valve repair) (4/19/2018)- per CTS      Atelectasis (4/20/2018)      First degree AV block (4/22/2018)- new post-op. See below. Mobitz (type) I (Wenckebach's) atrioventricular block (4/22/2018)- reviewed EKGs prior to MV and maze surgery and first degree AVB and second degree AVBs ( both mobitz type I and II) are all new. Patient is asymptomatic. Lopressor held this am and currently on multaq. Will discuss with Dr. John Wright shortly. Will need EP see in am.     PAF- no recurrence post- op; continue eliquis and multaq. BB held today d/t first degree/second degree AVBs (mobitz type I and II. Thank you very much for this referral. We appreciate the opportunity to participate in this patient's care. We will follow along with above stated plan.     Greta Dowell NP  Consulting MD: Dr. John Wright

## 2018-04-22 NOTE — PROGRESS NOTES
Marleny Salgado III  Admission Date: 4/19/2018             Daily Progress Note: 4/22/2018    The patient's chart is reviewed and the patient is discussed with the staff. 66 yo male with a history of anxiety, a.fib/flutter tx with Multaq and Eliquis. Recently found to have a murmur. TTE showed flail posterior MV leaflet with ruptured chord and severe MR. LHC with mild/mod CAD, 4+ MR. Patient is now s/p MV repair with MAZE 4/19/18 per Dr. Lit Calderón. Patient smoked only briefly in high school. He was an active runner up until last August. Since then, he has felt too short of breath and fatigued. Subjective:     Currently on RA with o2 sat 94%. Occasional cough - produced moderate amount of dark mucus yesterday but has lightened with time. Using IS and drawing 1750 ml. Incisional pain well controlled with pain medication.        Current Facility-Administered Medications   Medication Dose Route Frequency    tapentadol (NUCYNTA) tablet 100 mg  100 mg Oral Q4H PRN    senna-docusate (PERICOLACE) 8.6-50 mg per tablet 1 Tab  1 Tab Oral BID    bisacodyl (DULCOLAX) tablet 10 mg  10 mg Oral DAILY PRN    metoprolol tartrate (LOPRESSOR) tablet 12.5 mg  12.5 mg Oral Q12H    sodium chloride (NS) flush 5-10 mL  5-10 mL IntraVENous Q8H    sodium chloride (NS) flush 5-10 mL  5-10 mL IntraVENous PRN    alum-mag hydroxide-simeth (MYLANTA) oral suspension 30 mL  30 mL Oral Q4H PRN    acetaminophen (TYLENOL) tablet 650 mg  650 mg Oral Q4H PRN    zolpidem (AMBIEN) tablet 5 mg  5 mg Oral QHS PRN    aspirin delayed-release tablet 81 mg  81 mg Oral DAILY    magnesium oxide (MAG-OX) tablet 400 mg  400 mg Oral TID PRN    magnesium oxide (MAG-OX) tablet 400 mg  400 mg Oral QID PRN    potassium chloride (KLOR-CON) tablet 10 mEq  10 mEq Oral DAILY    potassium chloride (K-DUR, KLOR-CON) SR tablet 20 mEq  20 mEq Oral BID PRN    potassium chloride (K-DUR, KLOR-CON) SR tablet 40 mEq  40 mEq Oral BID PRN    traMADol (ULTRAM) tablet 100 mg  100 mg Oral Q6H PRN    mupirocin (BACTROBAN) 2 % ointment   Both Nostrils Q12H    famotidine (PEPCID) tablet 20 mg  20 mg Oral Q12H    LORazepam (ATIVAN) tablet 1 mg  1 mg Oral BID PRN    dronedarone (MULTAQ) tablet tab 400 mg  400 mg Oral BID WITH MEALS    sertraline (ZOLOFT) tablet 100 mg  100 mg Oral DAILY       Review of Systems  Constitutional: negative for fever, chills, sweats  Cardiovascular: negative for chest pain, palpitations, syncope, edema  Gastrointestinal:  negative for dysphagia, reflux, vomiting, diarrhea, abdominal pain, or melena  Neurologic:  negative for focal weakness, numbness, headache    Objective:     Vitals:    04/21/18 2213 04/22/18 0217 04/22/18 0419 04/22/18 0428   BP: 106/63  112/57    Pulse: 62  (!) 59    Resp: 18  16    Temp: 97.9 °F (36.6 °C)  97.8 °F (36.6 °C)    SpO2: 96%  94%    Weight:  171 lb 1.6 oz (77.6 kg)  171 lb 1.6 oz (77.6 kg)   Height:         Intake and Output:   04/20 1901 - 04/22 0700  In: 600 [P.O.:600]  Out: 1070 [Urine:1070]       Physical Exam:   Constitution:  the patient is well developed and in no acute distress  EENMT:  Sclera clear, pupils equal, oral mucosa moist  Respiratory: clear to auscultation bilaterally, RA  Cardiovascular:  RRR without M,G,R  Gastrointestinal: soft and non-tender; with positive bowel sounds. Musculoskeletal: warm without cyanosis. There is no lower leg edema.   Skin:  no jaundice or rashes, R flank incision with gauze and tape dressing   Neurologic: no gross neuro deficits     Psychiatric:  alert and oriented x 3    CXR:   4/20        LAB  Recent Labs      04/21/18   0630  04/20/18   2116  04/20/18   1559  04/20/18   0830  04/20/18   0617   GLUCPOC  114*  118*  114*  92  101*      Recent Labs      04/20/18   1854  04/20/18   0315  04/19/18   1933  04/19/18   1442   WBC  13.2*  12.1*   --   14.8*   HGB  14.2  13.0*  14.2  15.2   HCT  40.4*  37.8*  40.3*  43.1   PLT  118*  125*   -- 119*   INR   --    --    --   1.5     Recent Labs      04/22/18   0417  04/21/18   0400  04/20/18   1854  04/20/18   0315   04/19/18   1442   NA   --    --    --   143   --   144   K  4.0  4.0  3.8  4.5   < >  4.6   CL   --    --    --   111*   --   111*   CO2   --    --    --   24   --   23   GLU   --    --    --   119*   --   105*   BUN   --    --    --   18   --   17   CREA   --    --    --   0.88   --   1.09   MG  2.2  2.2  2.2  2.3   < >  3.8*   CA   --    --    --   7.8*   --   10.1    < > = values in this interval not displayed. Recent Labs      04/19/18   1430   PH  7.31*   PCO2  42   PO2  85   HCO3  21*     No results for input(s): LCAD, LAC in the last 72 hours. Assessment:  (Medical Decision Making)     Hospital Problems  Date Reviewed: 4/21/2018          Codes Class Noted POA    Atelectasis ICD-10-CM: J98.11  ICD-9-CM: 518.0  4/20/2018 Unknown    Pain control / IS / mobilize      Respiratory failure, post-operative ([facility] ICD-10-CM: X29.198  ICD-9-CM: 518.51  4/19/2018 No    Currently on RA      Hypoxia ICD-10-CM: R09.02  ICD-9-CM: 799.02  4/19/2018 No        Anxiety (Chronic) ICD-10-CM: F41.9  ICD-9-CM: 300.00  4/19/2018 Yes        Mitral valve prolapse ICD-10-CM: I34.1  ICD-9-CM: 424.0  4/19/2018 Yes        S/P MVR (mitral valve repair) ICD-10-CM: S73.501  ICD-9-CM: V45.89  4/19/2018 Yes        * (Principal)Mitral valve regurgitation (Chronic) ICD-10-CM: I34.0  ICD-9-CM: 424.0  Unknown Yes     4/19/18 (Dr Liborio Lees) [address] [place] [address] with quadrant resection P2, primary closure and 32 mm Granado ring annuloplasty(MVR) MINIMALLY INVASIVE. CRYOABLATION OF RIGHT INTERCOSTAL SPACES  MAZE PROCEDURE WITH CRYOABLATION            Plan:  (Medical Decision Making)     --IS  --mobilize - PT following - ambulated 200' yesterday  --pain control seems to be main issue this am - limiting ability to effectively participate. Medications adjusted by CT Surgery.       No further pulmonary needs identified at this time.      More than 50% of the time documented was spent in face-to-face contact with the patient and in the care of the patient on the floor/unit where the patient is located. Suyapa Mcarthur NP  The patient has been seen and examined by me personally, the chart,labs, and radiographic studies have been reviewed. Chest: CTA  Extremities: no edema    I agree with the above assessment and plan. Will sign off- call as needed.   Kristi Hassan MD.

## 2018-04-23 LAB
ATRIAL RATE: 68 BPM
CALCULATED R AXIS, ECG10: 14 DEGREES
CALCULATED T AXIS, ECG11: 43 DEGREES
DIAGNOSIS, 93000: NORMAL
Q-T INTERVAL, ECG07: 434 MS
QRS DURATION, ECG06: 94 MS
QTC CALCULATION (BEZET), ECG08: 461 MS
VENTRICULAR RATE, ECG03: 68 BPM

## 2018-04-23 PROCEDURE — 74011250637 HC RX REV CODE- 250/637: Performed by: INTERNAL MEDICINE

## 2018-04-23 PROCEDURE — 93005 ELECTROCARDIOGRAM TRACING: CPT | Performed by: INTERNAL MEDICINE

## 2018-04-23 PROCEDURE — 65660000004 HC RM CVT STEPDOWN

## 2018-04-23 PROCEDURE — 74011250637 HC RX REV CODE- 250/637: Performed by: THORACIC SURGERY (CARDIOTHORACIC VASCULAR SURGERY)

## 2018-04-23 PROCEDURE — 97530 THERAPEUTIC ACTIVITIES: CPT

## 2018-04-23 PROCEDURE — 97116 GAIT TRAINING THERAPY: CPT

## 2018-04-23 RX ADMIN — Medication 10 ML: at 05:16

## 2018-04-23 RX ADMIN — TAPENTADOL HYDROCHLORIDE 100 MG: 50 TABLET, FILM COATED ORAL at 14:50

## 2018-04-23 RX ADMIN — MUPIROCIN: 20 OINTMENT TOPICAL at 21:17

## 2018-04-23 RX ADMIN — APIXABAN 5 MG: 5 TABLET, FILM COATED ORAL at 21:15

## 2018-04-23 RX ADMIN — LORAZEPAM 1 MG: 1 TABLET ORAL at 21:15

## 2018-04-23 RX ADMIN — ZOLPIDEM TARTRATE 5 MG: 5 TABLET ORAL at 22:51

## 2018-04-23 RX ADMIN — LACTULOSE 10 G: 20 SOLUTION ORAL at 18:00

## 2018-04-23 RX ADMIN — STANDARDIZED SENNA CONCENTRATE AND DOCUSATE SODIUM 1 TABLET: 8.6; 5 TABLET, FILM COATED ORAL at 09:22

## 2018-04-23 RX ADMIN — POTASSIUM CHLORIDE 20 MEQ: 20 TABLET, EXTENDED RELEASE ORAL at 18:01

## 2018-04-23 RX ADMIN — TAPENTADOL HYDROCHLORIDE 100 MG: 50 TABLET, FILM COATED ORAL at 04:01

## 2018-04-23 RX ADMIN — SERTRALINE HYDROCHLORIDE 100 MG: 100 TABLET ORAL at 09:22

## 2018-04-23 RX ADMIN — POTASSIUM CHLORIDE 20 MEQ: 20 TABLET, EXTENDED RELEASE ORAL at 09:18

## 2018-04-23 RX ADMIN — MUPIROCIN: 20 OINTMENT TOPICAL at 09:23

## 2018-04-23 RX ADMIN — APIXABAN 5 MG: 5 TABLET, FILM COATED ORAL at 09:23

## 2018-04-23 RX ADMIN — TAPENTADOL HYDROCHLORIDE 100 MG: 50 TABLET, FILM COATED ORAL at 21:15

## 2018-04-23 RX ADMIN — Medication 10 ML: at 14:54

## 2018-04-23 RX ADMIN — ASPIRIN 81 MG: 81 TABLET, COATED ORAL at 09:18

## 2018-04-23 RX ADMIN — Medication 10 ML: at 21:17

## 2018-04-23 RX ADMIN — FUROSEMIDE 40 MG: 40 TABLET ORAL at 09:19

## 2018-04-23 RX ADMIN — FAMOTIDINE 20 MG: 20 TABLET ORAL at 21:15

## 2018-04-23 RX ADMIN — POTASSIUM CHLORIDE 10 MEQ: 10 TABLET, EXTENDED RELEASE ORAL at 09:17

## 2018-04-23 RX ADMIN — STANDARDIZED SENNA CONCENTRATE AND DOCUSATE SODIUM 1 TABLET: 8.6; 5 TABLET, FILM COATED ORAL at 21:15

## 2018-04-23 RX ADMIN — ACETAMINOPHEN 650 MG: 325 TABLET ORAL at 11:42

## 2018-04-23 RX ADMIN — FAMOTIDINE 20 MG: 20 TABLET ORAL at 09:18

## 2018-04-23 RX ADMIN — TAPENTADOL HYDROCHLORIDE 100 MG: 50 TABLET, FILM COATED ORAL at 09:19

## 2018-04-23 NOTE — PROGRESS NOTES
[facility] CARDIOLOGY PROGRESS NOTE           4/23/2018 6:58 AM    Admit Date: 4/19/2018    Admit Diagnosis: Non-rheumatic mitral regurgitation [I34.0]; Paroxysmal atria*      Subjective:   Patient overall has been cardiac stable with 1st degree Av Block and occasional 2nd AV Block    Objective:     Vitals:    04/22/18 1441 04/22/18 1918 04/22/18 2224 04/23/18 0407   BP: 107/55 123/62 119/69 111/58   Pulse: 68 70 63 79   Resp: 18 18 18 18   Temp: (!) 100.9 °F (38.3 °C) 97.9 °F (36.6 °C) 99.1 °F (37.3 °C) 98.1 °F (36.7 °C)   SpO2: 98% 97% 96% 98%   Weight:    167 lb 14.4 oz (76.2 kg)   Height:           Physical Exam:  General-Well Developed, Well Nourished, No Acute Distress, Alert & Oriented x 3, appropriate mood. Neck- supple, no JVD  CV- regular rate and rhythm no MRG  Lung- clear bilaterally  Abd- soft, nontender, nondistended  Ext- no edema bilaterally.   Skin- warm and dry    Current Facility-Administered Medications   Medication Dose Route Frequency    apixaban (ELIQUIS) tablet 5 mg  5 mg Oral Q12H    furosemide (LASIX) tablet 40 mg  40 mg Oral ONCE    tapentadol (NUCYNTA) tablet 100 mg  100 mg Oral Q4H PRN    senna-docusate (PERICOLACE) 8.6-50 mg per tablet 1 Tab  1 Tab Oral BID    bisacodyl (DULCOLAX) tablet 10 mg  10 mg Oral DAILY PRN    sodium chloride (NS) flush 5-10 mL  5-10 mL IntraVENous Q8H    sodium chloride (NS) flush 5-10 mL  5-10 mL IntraVENous PRN    alum-mag hydroxide-simeth (MYLANTA) oral suspension 30 mL  30 mL Oral Q4H PRN    acetaminophen (TYLENOL) tablet 650 mg  650 mg Oral Q4H PRN    zolpidem (AMBIEN) tablet 5 mg  5 mg Oral QHS PRN    aspirin delayed-release tablet 81 mg  81 mg Oral DAILY    magnesium oxide (MAG-OX) tablet 400 mg  400 mg Oral TID PRN    magnesium oxide (MAG-OX) tablet 400 mg  400 mg Oral QID PRN    potassium chloride (KLOR-CON) tablet 10 mEq  10 mEq Oral DAILY    potassium chloride (K-DUR, KLOR-CON) SR tablet 20 mEq  20 mEq Oral BID PRN    potassium chloride (K-DUR, KLOR-CON) SR tablet 40 mEq  40 mEq Oral BID PRN    traMADol (ULTRAM) tablet 100 mg  100 mg Oral Q6H PRN    mupirocin (BACTROBAN) 2 % ointment   Both Nostrils Q12H    famotidine (PEPCID) tablet 20 mg  20 mg Oral Q12H    LORazepam (ATIVAN) tablet 1 mg  1 mg Oral BID PRN    sertraline (ZOLOFT) tablet 100 mg  100 mg Oral DAILY     Data Review:   Recent Results (from the past 24 hour(s))   PLEASE READ & DOCUMENT PPD TEST IN 72 HRS    Collection Time: 04/22/18  8:27 PM   Result Value Ref Range    PPD neg Negative    mm Induration 0 mm     Assessment:     Principal Problem:    Mitral valve regurgitation ()      Overview: 4/19/18 (Dr Janet Johnson) [address] [place] [address] with quadrant resection P2,       primary closure and 32 mm Granado ring annuloplasty(MVR) MINIMALLY       INVASIVE. CRYOABLATION OF RIGHT INTERCOSTAL SPACES      MAZE PROCEDURE WITH CRYOABLATION    Active Problems:    Respiratory failure, post-operative ([address] (4/19/2018)      Hypoxia (4/19/2018)      Anxiety (4/19/2018)      Mitral valve prolapse (4/19/2018)      S/P MVR (mitral valve repair) (4/19/2018)      Atelectasis (4/20/2018)      First degree AV block (4/22/2018)      Mobitz (type) I (Wenckebach's) atrioventricular block (4/22/2018)      Plan:   1. Av Block - 1st with occasional 2nd degree. Stable rates with no symptoms. No indication for a pacemaker at this time. Stopped Amio and Metoprolol. Monitor  2. A. Fib - in NSR, on Eliquis    [employer]. Herminio Pittman M.D., F.A.C.C, F.H.R.S.   Cardiology/Electrophysiology

## 2018-04-23 NOTE — PROGRESS NOTES
Subjective:   No complaints  OOB and independent    Objective:     Patient Vitals for the past 8 hrs:   BP Temp Pulse Resp SpO2 Weight   18 0718 118/59 99.6 °F (37.6 °C) 67 18 96 % -   18 0407 111/58 98.1 °F (36.7 °C) 79 18 98 % 167 lb 14.4 oz (76.2 kg)     Temp (24hrs), Av.4 °F (37.4 °C), Min:97.9 °F (36.6 °C), Max:100.9 °F (38.3 °C)        Heart:  regular rate and rhythm, S1, S2 normal, no murmur, click, rub or gallop  Lung:  clear to auscultation bilaterally   Incisions: Clean, dry, and intact    Labs:  Recent Results (from the past 24 hour(s))   PLEASE READ & DOCUMENT PPD TEST IN 72 HRS    Collection Time: 18  8:27 PM   Result Value Ref Range    PPD neg Negative    mm Induration 0 mm   EKG, 12 LEAD, INITIAL    Collection Time: 18  7:05 AM   Result Value Ref Range    Ventricular Rate 68 BPM    Atrial Rate 68 BPM    QRS Duration 94 ms    Q-T Interval 434 ms    QTC Calculation (Bezet) 461 ms    Calculated R Axis 14 degrees    Calculated T Axis 43 degrees    Diagnosis       Atrial fibrillation with a competing junctional pacemaker  Abnormal ECG  When compared with ECG of 2018 06:17,  Atrial fibrillation has replaced Sinus rhythm  QRS axis Shifted right  Confirmed by RIKA MORRIS (), Collins Lepe ([id number]) on 2018 7:14:17 AM         Assessment:     Principal Problem:    Mitral valve regurgitation ()      Overview: 18 (Dr Liborio Lees) [address] [place] [address] with quadrant resection P2,       primary closure and 32 mm Granado ring annuloplasty(MVR) MINIMALLY       INVASIVE.  CRYOABLATION OF RIGHT INTERCOSTAL SPACES      MAZE PROCEDURE WITH CRYOABLATION    Active Problems:    Respiratory failure, post-operative ([address] (2018)      Hypoxia (2018)      Anxiety (2018)      Mitral valve prolapse (2018)      S/P MVR (mitral valve repair) (2018)      Atelectasis (2018)      First degree AV block (2018)      Mobitz (type) I (Wenckebach's) atrioventricular block (4/22/2018)        Plan/Recommendations/Medical Decision Making:   POD 4 Mini Mitral repair/MAZE  Afib (on Eliquis) with First and second degree block, consulted cards- wonder if needs additional day tele monitoring? Looks ready for discharge    See orders            5/13/2018     11:04 AM    I have personally seen and examined Francisco THOMAS Cohens III with  GABBIE Murphy. I agree and confirm findings in history, physical exam, and assessment/plan as outlined above, except as noted below.     Miguelangel Cope MD

## 2018-04-23 NOTE — PROGRESS NOTES
Problem: Mobility Impaired (Adult and Pediatric)  Goal: *Acute Goals and Plan of Care (Insert Text)  LTG:  (1.)Mr. Hyacinth Chilel will move from supine to sit and sit to supine  in bed with MODIFIED INDEPENDENCE within 3 treatment day(s). MET 4/23/18  (2.)Mr. Hyacinth Chilel will transfer from bed to chair and chair to bed with MODIFIED INDEPENDENCE using the least restrictive device within 3 treatment day(s). MET 4/23/18  (3.)Mr. Hyacinth Chilel will ambulate with MODIFIED INDEPENDENCE for 500 feet with the least restrictive device within 3 treatment day(s). MET 4/23/18  ________________________________________________________________________________________________      PHYSICAL THERAPY: Daily Note, Treatment Day: 1st, AM 4/23/2018  INPATIENT: Hospital Day: 5  Payor: Diomedes Null / Plan: CLEMENTE GALLO OAP / Product Type: Commerical /      NAME/AGE/GENDER: Sonny Mclaughlin III is a 65 y.o. male   PRIMARY DIAGNOSIS: Non-rheumatic mitral regurgitation [I34.0]  Paroxysmal atrial fibrillation (HCC) [I48.0] Mitral valve regurgitation Mitral valve regurgitation  Procedure(s) (LRB):  MITRAL VALVE REPAIR (MVR) MINIMALLY INVASIVE. CRYOABLATION OF RIGHT INTERCOSTAL SPACES (N/A)  MAZE PROCEDURE WITH CRYOABLATION (N/A)  MALLORY/ANES PROBE  DR GARCIA FOR ANES (N/A)  4 Days Post-Op  ICD-10: Treatment Diagnosis:    · Difficulty in walking, Not elsewhere classified (R26.2)   Precaution/Allergies:  Review of patient's allergies indicates no known allergies. ASSESSMENT:     Mr. Hyacinth Chilel presented sitting up in chair, and is agreeable to treatment. Patient performed stand to sit transfer with Supervision. Patient ambulated 750 ft, Supervision. Patient ascended/descended 3 stairs x 3, Supervision. Patient returned to room. Patient was instructed in standing exercises, with verbal, visual, and tactile cues for good technique. Patient was instructed in bed mobility, he performed all bed mobility activities with Supervision. Patient has met all goals.  PT is discharging patient. This section established at most recent assessment   PROBLEM LIST (Impairments causing functional limitations):  1. Decreased Strength  2. Decreased ADL/Functional Activities  3. Decreased Transfer Abilities  4. Decreased Ambulation Ability/Technique  5. Decreased Balance  6. Increased Pain  7. Decreased Activity Tolerance  8. Decreased Pacing Skills  9. Decreased Work Simplification/Energy Conservation Techniques  10. Increased Fatigue  11. Increased Shortness of Breath  12. Decreased Bishop with Home Exercise Program   INTERVENTIONS PLANNED: (Benefits and precautions of physical therapy have been discussed with the patient.)  1. Balance Exercise  2. Bed Mobility  3. Family Education  4. Gait Training  5. Home Exercise Program (HEP)  6. Manual Therapy  7. Neuromuscular Re-education/Strengthening  8. Range of Motion (ROM)  9. Therapeutic Activites  10. Therapeutic Exercise/Strengthening  11. Transfer Training     TREATMENT PLAN: Frequency/Duration: twice daily for duration of hospital stay  Rehabilitation Potential For Stated Goals: Good     RECOMMENDED REHABILITATION/EQUIPMENT: (at time of discharge pending progress): Due to the probability of continued deficits (see above) this patient will not likely need continued skilled physical therapy after discharge. Equipment:    None at this time              HISTORY:   History of Present Injury/Illness (Reason for Referral):  Per doctors note: \"Mitral valve regurgitation ()      Overview: 4/19/18 (Dr Anup Horn) [facility] with quadrant resection P2,       primary closure and 32 mm Granado ring annuloplasty(MVR) MINIMALLY       INVASIVE. CRYOABLATION OF RIGHT INTERCOSTAL SPACES      MAZE PROCEDURE WITH CRYOABLATION\"  Past Medical History/Comorbidities:   Mr. Ashu Richards  has a past medical history of Allergic rhinitis due to other allergen (2/25/2015); Anticoagulated (4/3/2018);  Anxiety state, unspecified (2/25/2015); CAD (coronary artery disease); Depression; First degree hemorrhoids (2/20/2017); Insomnia, unspecified (2/25/2015); Lipoma (2/25/2015); Mitral valve regurgitation; Paroxysmal A-fib ([address] and Unspecified adjustment reaction (2/25/2015). Mr. Juan Rodriguez  has a past surgical history that includes hx wisdom teeth extraction; echo transesophageal (gifty) w or wo contr (3/15/2018); hx heart catheterization (03/15/2018); hx heent (1996); and hx orthopaedic (late 1990's). Social History/Living Environment:   Home Environment: Private residence  # Steps to Enter: 3  One/Two Story Residence: One story  Living Alone: No  Support Systems: Family member(s), Child(guillermina)  Patient Expects to be Discharged to[de-identified] Private residence  Current DME Used/Available at Home: None  Prior Level of Function/Work/Activity:  Brittanie Richard III is independent with functional mobility and activities of daily living. Personal Factors:          Sex:  male        Age:  65 y.o. Number of Personal Factors/Comorbidities that affect the Plan of Care: 1-2: MODERATE COMPLEXITY   EXAMINATION:   Most Recent Physical Functioning:   Gross Assessment:                  Posture:     Balance:  Sitting: Intact  Standing: Intact Bed Mobility:  Rolling: Supervision  Supine to Sit: Supervision  Sit to Supine: Supervision  Scooting: Supervision  Wheelchair Mobility:     Transfers:  Sit to Stand: Supervision  Stand to Sit: Supervision  Gait:     Base of Support: Narrowed  Step Length: Left shortened;Right shortened  Distance (ft): 750 Feet (ft)  Number of Stairs Trained: 3 (x2)  Stairs - Level of Assistance: Supervision  Rail Use: Left       Body Structures Involved:  1. Heart  2. Lungs  3. Joints  4. Muscles Body Functions Affected:  1. Sensory/Pain  2. Cardio  3. Respiratory  4. Neuromusculoskeletal  5. Movement Related Activities and Participation Affected:  1. General Tasks and Demands  2. Mobility  3. Self Care  4. Domestic Life  5.  Interpersonal Interactions and Relationships  6. Community, Social and Wendell [place]   Number of elements that affect the Plan of Care: 3: MODERATE COMPLEXITY   CLINICAL PRESENTATION:   Presentation: Stable and uncomplicated: LOW COMPLEXITY   CLINICAL DECISION MAKIN [facility] [address] [address] AM-PAC 6 Clicks   Basic Mobility Inpatient Short Form  How much difficulty does the patient currently have. .. Unable A Lot A Little None   1. Turning over in bed (including adjusting bedclothes, sheets and blankets)? [] 1   [] 2   [x] 3   [] 4   2. Sitting down on and standing up from a chair with arms ( e.g., wheelchair, bedside commode, etc.)   [] 1   [] 2   [x] 3   [] 4   3. Moving from lying on back to sitting on the side of the bed? [] 1   [] 2   [x] 3   [] 4   How much help from another person does the patient currently need. .. Total A Lot A Little None   4. Moving to and from a bed to a chair (including a wheelchair)? [] 1   [] 2   [x] 3   [] 4   5. Need to walk in hospital room? [] 1   [] 2   [x] 3   [] 4   6. Climbing 3-5 steps with a railing? [] 1   [] 2   [x] 3   [] 4   © , Trustees of [facility] [address] [address], under license to [employer]. All rights reserved      Score:  Initial: 18     Interpretation of Tool:  Represents activities that are increasingly more difficult (i.e. Bed mobility, Transfers, Gait). Score 24 23 22-20 19-15 14-10 9-7 6     Modifier CH CI CJ CK CL CM CN      ? Mobility - Walking and Moving Around:     - CURRENT STATUS: CK - 40%-59% impaired, limited or restricted    - GOAL STATUS: CJ - 20%-39% impaired, limited or restricted    - D/C STATUS:  ---------------To be determined---------------  Payor: SABINO / Plan: CLEMENTE GALLO OAP / Product Type: Commerical /      Medical Necessity:     · Patient is expected to demonstrate progress in strength, balance, coordination and functional technique to increase independence with pain free functional mobility.   · Patient demonstrates good rehab potential due to higher previous functional level. Reason for Services/Other Comments:  · Patient continues to require skilled intervention due to medical complications and decreased activity tolerance with functional mobility. Use of outcome tool(s) and clinical judgement create a POC that gives a: Clear prediction of patient's progress: LOW COMPLEXITY            TREATMENT:   (In addition to Assessment/Re-Assessment sessions the following treatments were rendered)   Pre-treatment Symptoms/Complaints: \"I used to run 5-6 miles a day. \"  Pain: Initial: no number given  Pain Intensity 1: 0  Post Session:  Pain not rated      Therapeutic Activity: (    15 minutes): Therapeutic activities including Bed mobility, ambulation, and stair training to improve mobility, strength and activity tolerance. Required minimal verbal cues   to promote motor control of lower extremity(s), slowing speed. Therapeutic Exercise: (15 Minutes):  Exercises per grid below to improve mobility, strength, coordination and activity tolerance. Required minimal visual, verbal and tactile cues to promote proper body alignment, promote proper body posture and promote proper body mechanics. Progressed resistance, repetitions and complexity of movement as indicated.      Date:  4/23/18 Date:   Date:     Activity/Exercise Parameters Parameters Parameters   Standing Toe raises x20     Standing heel raises x20     Standing Hamstring curls x20     Standing hip abduciton x20     Standing hip extension x20     Standing Marching x20     Standing Minisquats x20           Braces/Orthotics/Lines/Etc:   · O2 Device: Room air  Treatment/Session Assessment:    · Response to Treatment:  Wilfrid Carter III's blood pressure 102/52mmHg after ambulating 200 feet   · Interdisciplinary Collaboration:   o Physical Therapy Assistant  o Registered Nurse  o SPTA  · After treatment position/precautions:   o Up in chair  o Bed/Chair-wheels locked  o Call light within reach  o RN notified  o Family at bedside   · Compliance with Program/Exercises: Patient is being discharged from physical therapy services  · Recommendations/Intent for next treatment session: Patient is being discharged from physical therapy services secondary to having met all goals.   Total Treatment Duration:  PT Patient Time In/Time Out  Time In: 1040  Time Out: 48 Nareshe Marina Mohamud, BRIANA

## 2018-04-23 NOTE — PROGRESS NOTES
Cardiac Rehab: Spoke with patient regarding referral to cardiac rehab. Patient meets admission criteria based on MVR(4/19/18). Written information about Cardiac Rehab given and reviewed with patient. Discussed lifestyle modifications to promote cardiac wellness. Patient indicated that he wants to participate in the cardiac rehab program and his Cardiac Rehab orientation has been scheduled for 5/4/18. Cardiologist is Dr. Yesenia Escobedo.       Thank you,  SAEED CamposN, RN  Cardiopulmonary Rehabilitation Nurse Liaison  [facility]

## 2018-04-23 NOTE — PROGRESS NOTES
Bedside shift report received from Rosie Alonso RN (offgoing nurse). Report given to Carrie Tate RN. Vital signs stable. No complaints present. Patient in stable condition.

## 2018-04-24 VITALS
DIASTOLIC BLOOD PRESSURE: 57 MMHG | HEART RATE: 85 BPM | WEIGHT: 167.6 LBS | OXYGEN SATURATION: 90 % | BODY MASS INDEX: 26.3 KG/M2 | HEIGHT: 67 IN | SYSTOLIC BLOOD PRESSURE: 108 MMHG | RESPIRATION RATE: 18 BRPM | TEMPERATURE: 99.4 F

## 2018-04-24 PROCEDURE — 74011250637 HC RX REV CODE- 250/637: Performed by: THORACIC SURGERY (CARDIOTHORACIC VASCULAR SURGERY)

## 2018-04-24 RX ORDER — TRAMADOL HYDROCHLORIDE 50 MG/1
50 TABLET ORAL
Qty: 30 TAB | Refills: 0 | Status: SHIPPED | OUTPATIENT
Start: 2018-04-24 | End: 2018-04-24

## 2018-04-24 RX ORDER — FACIAL-BODY WIPES
10 EACH TOPICAL DAILY PRN
Status: DISCONTINUED | OUTPATIENT
Start: 2018-04-24 | End: 2018-04-24 | Stop reason: HOSPADM

## 2018-04-24 RX ORDER — AMOXICILLIN 250 MG
CAPSULE ORAL
Qty: 1 TAB | Refills: 0 | Status: SHIPPED
Start: 2018-04-24 | End: 2018-08-03

## 2018-04-24 RX ORDER — ACETAMINOPHEN 325 MG/1
TABLET ORAL
Qty: 1 TAB | Refills: 0 | Status: SHIPPED
Start: 2018-04-24 | End: 2018-09-26

## 2018-04-24 RX ORDER — ASPIRIN 81 MG/1
81 TABLET ORAL DAILY
Qty: 365 TAB | Refills: 10000 | Status: SHIPPED | OUTPATIENT
Start: 2018-04-25 | End: 2019-06-25 | Stop reason: SDUPTHER

## 2018-04-24 RX ADMIN — TAPENTADOL HYDROCHLORIDE 100 MG: 50 TABLET, FILM COATED ORAL at 12:16

## 2018-04-24 RX ADMIN — Medication 10 ML: at 05:49

## 2018-04-24 RX ADMIN — TAPENTADOL HYDROCHLORIDE 100 MG: 50 TABLET, FILM COATED ORAL at 04:12

## 2018-04-24 RX ADMIN — MUPIROCIN: 20 OINTMENT TOPICAL at 08:13

## 2018-04-24 RX ADMIN — SERTRALINE HYDROCHLORIDE 100 MG: 100 TABLET ORAL at 08:10

## 2018-04-24 RX ADMIN — TAPENTADOL HYDROCHLORIDE 100 MG: 50 TABLET, FILM COATED ORAL at 08:09

## 2018-04-24 RX ADMIN — BISACODYL 10 MG: 10 SUPPOSITORY RECTAL at 08:32

## 2018-04-24 RX ADMIN — FAMOTIDINE 20 MG: 20 TABLET ORAL at 08:10

## 2018-04-24 RX ADMIN — APIXABAN 5 MG: 5 TABLET, FILM COATED ORAL at 08:10

## 2018-04-24 RX ADMIN — STANDARDIZED SENNA CONCENTRATE AND DOCUSATE SODIUM 1 TABLET: 8.6; 5 TABLET, FILM COATED ORAL at 08:10

## 2018-04-24 RX ADMIN — ASPIRIN 81 MG: 81 TABLET, COATED ORAL at 08:10

## 2018-04-24 RX ADMIN — BISACODYL 10 MG: 5 TABLET, COATED ORAL at 04:13

## 2018-04-24 NOTE — PROGRESS NOTES
Bedside shift report given to Aris Rodriguez RN (oncoming nurse) by Lolita Roca RN (offgoing nurse). Bedside shift report included the following information: SBAR, Kardex, ED Summary, MAR, and Recent Results.

## 2018-04-24 NOTE — PROGRESS NOTES
Problem: Falls - Risk of  Goal: *Absence of Falls  Document Jigar Fall Risk and appropriate interventions in the flowsheet.    Outcome: Progressing Towards Goal  Fall Risk Interventions:  Mobility Interventions: Communicate number of staff needed for ambulation/transfer, Patient to call before getting OOB         Medication Interventions: Patient to call before getting OOB, Teach patient to arise slowly    Elimination Interventions: Call light in reach, Toilet paper/wipes in reach, Patient to call for help with toileting needs

## 2018-04-24 NOTE — PROGRESS NOTES
[facility] CARDIOLOGY PROGRESS NOTE           4/24/2018 2:13 PM    Admit Date: 4/19/2018      Subjective:   Patient denies any dizziness or syncope. Has persistent first degree AV block. Plans for discharge. ROS:  Cardiovascular:  As noted above    Objective:      Vitals:    04/24/18 0411 04/24/18 0446 04/24/18 0719 04/24/18 1139   BP: 117/68  108/65 108/57   Pulse: 79  79 85   Resp: 18 18 18   Temp: 98.4 °F (36.9 °C)  98.1 °F (36.7 °C) 99.4 °F (37.4 °C)   SpO2: 98%  97% 90%   Weight:  76 kg (167 lb 9.6 oz)     Height:  5' 7\" (1.702 m)         Physical Exam:  General-No Acute Distress  Neck- supple, no JVD  CV- regular rate and rhythm no MRG  Lung- clear bilaterally  Abd- soft, nontender, nondistended  Ext- no edema bilaterally. Skin- warm and dry      Data Review:   Recent Labs      04/22/18 0417   K  4.0   MG  2.2      No results found for: TROIQ, JOEL, TROPT, TNIPOC    Assessment/Plan:     Principal Problem:    Mitral valve regurgitation ()    S/P mitral valve repair. Active Problems:    Hypoxia (4/19/2018)    Resolved. First degree AV block (4/22/2018)    Hold Multaq and Toprol until seen in office in follow up. ON Eliquis      Mobitz (type) I (Wenckebach's) atrioventricular block (4/22/2018)   See above.                    García Crenshaw MD  4/24/2018 2:13 PM

## 2018-04-24 NOTE — PROGRESS NOTES
Reviewed discharge instructions with patient and spouse and gave copies and prescriptions. Gave time for questions.

## 2018-04-24 NOTE — PROGRESS NOTES
Subjective:   No complaints  OOB and independent    Objective:     Patient Vitals for the past 8 hrs:   BP Temp Pulse Resp SpO2 Height Weight   18 0719 108/65 98.1 °F (36.7 °C) 79 18 97 % - -   18 0446 - - - - - 5' 7\" (1.702 m) 167 lb 9.6 oz (76 kg)   18 0411 117/68 98.4 °F (36.9 °C) 79 18 98 % - -     Temp (24hrs), Av.7 °F (37.1 °C), Min:98.1 °F (36.7 °C), Max:100.3 °F (37.9 °C)        Heart:  regular rate and rhythm, S1, S2 normal, no murmur, click, rub or gallop  Lung:  clear to auscultation bilaterally   Incisions: Clean, dry, and intact    Labs:  No results found for this or any previous visit (from the past 24 hour(s)). Assessment:     Principal Problem:    Mitral valve regurgitation ()      Overview: 18 (Dr Da Dowd) [address] [place] [address] with quadrant resection P2,       primary closure and 32 mm Granado ring annuloplasty(MVR) MINIMALLY       INVASIVE. CRYOABLATION OF RIGHT INTERCOSTAL SPACES      MAZE PROCEDURE WITH CRYOABLATION    Active Problems:    Respiratory failure, post-operative ([address] (2018)      Hypoxia (2018)      Anxiety (2018)      Mitral valve prolapse (2018)      S/P MVR (mitral valve repair) (2018)      Atelectasis (2018)      First degree AV block (2018)      Mobitz (type) I (Wenckebach's) atrioventricular block (2018)        Plan/Recommendations/Medical Decision Making:   POD 5 Mini Mitral repair/MAZE  Afib (on Eliquis) with First and second degree block, consulted cards- stable overnight, accelerated junctional? Will await Cardiology clearance for dc. No Multaq or BB at present. Looks ready for discharge    See orders            2018     9:16 AM    I have personally seen and examined Francisco Ham III with  GABBIE Olivares. I agree and confirm findings in history, physical exam, and assessment/plan as outlined above, except as noted below.     Lois Ocampo MD

## 2018-04-24 NOTE — PROGRESS NOTES
Per Anurag Rivers, pt is ready for discharge today. SW notified Chip and [address] with [facility] of this. Care Management Interventions  PCP Verified by CM: Yes  Last Visit to PCP: 04/04/18  Mode of Transport at Discharge:  Other (see comment) (Wife)  Transition of Care Consult (CM Consult): [address]: Yes  Physical Therapy Consult: Yes  Current Support Network: Lives with Spouse, Relative's Home  Confirm Follow Up Transport: Family  Plan discussed with Pt/Family/Caregiver: Yes  Freedom of Choice Offered: Yes  Discharge Location  Discharge Placement: Home with [facility])

## 2018-04-24 NOTE — DISCHARGE INSTRUCTIONS
DISCHARGE SUMMARY from Nurse    PATIENT INSTRUCTIONS:    After general anesthesia or intravenous sedation, for 24 hours or while taking prescription Narcotics:  · Limit your activities  · Do not drive and operate hazardous machinery  · Do not make important personal or business decisions  · Do  not drink alcoholic beverages  · If you have not urinated within 8 hours after discharge, please contact your surgeon on call. Report the following to your surgeon:  · Excessive pain, swelling, redness or odor of or around the surgical area  · Temperature over 100.5  · Nausea and vomiting lasting longer than 4 hours or if unable to take medications  · Any signs of decreased circulation or nerve impairment to extremity: change in color, persistent  numbness, tingling, coldness or increase pain      *  Please give a list of your current medications to your Primary Care Provider. *  Please update this list whenever your medications are discontinued, doses are      changed, or new medications (including over-the-counter products) are added. *  Please carry medication information at all times in case of emergency situations. These are general instructions for a healthy lifestyle:    No smoking/ No tobacco products/ Avoid exposure to second hand smoke  Surgeon General's Warning:  Quitting smoking now greatly reduces serious risk to your health. Obesity, smoking, and sedentary lifestyle greatly increases your risk for illness    A healthy diet, regular physical exercise & weight monitoring are important for maintaining a healthy lifestyle    You may be retaining fluid if you have a history of heart failure or if you experience any of the following symptoms:  Weight gain of 3 pounds or more overnight or 5 pounds in a week, increased swelling in our hands or feet or shortness of breath while lying flat in bed.   Please call your doctor as soon as you notice any of these symptoms; do not wait until your next office visit. Recognize signs and symptoms of STROKE:    F-face looks uneven    A-arms unable to move or move unevenly    S-speech slurred or non-existent    T-time-call 911 as soon as signs and symptoms begin-DO NOT go       Back to bed or wait to see if you get better-TIME IS BRAIN. Warning Signs of HEART ATTACK     Call 911 if you have these symptoms:   Chest discomfort. Most heart attacks involve discomfort in the center of the chest that lasts more than a few minutes, or that goes away and comes back. It can feel like uncomfortable pressure, squeezing, fullness, or pain.  Discomfort in other areas of the upper body. Symptoms can include pain or discomfort in one or both arms, the back, neck, jaw, or stomach.  Shortness of breath with or without chest discomfort.  Other signs may include breaking out in a cold sweat, nausea, or lightheadedness. Don't wait more than five minutes to call 911 - MINUTES MATTER! Fast action can save your life. Calling 911 is almost always the fastest way to get lifesaving treatment. Emergency Medical Services staff can begin treatment when they arrive -- up to an hour sooner than if someone gets to the hospital by car. The discharge information has been reviewed with the patient and spouse. The patient and spouse verbalized understanding. Discharge medications reviewed with the patient and spouse and appropriate educational materials and side effects teaching were provided.   ___________________________________________________________________________________________________________________________________

## 2018-04-25 ENCOUNTER — HOME CARE VISIT (OUTPATIENT)
Dept: SCHEDULING | Facility: HOME HEALTH | Age: 58
End: 2018-04-25
Payer: COMMERCIAL

## 2018-04-25 VITALS
OXYGEN SATURATION: 98 % | RESPIRATION RATE: 16 BRPM | DIASTOLIC BLOOD PRESSURE: 56 MMHG | SYSTOLIC BLOOD PRESSURE: 96 MMHG | TEMPERATURE: 98.9 F | HEART RATE: 56 BPM

## 2018-04-25 PROCEDURE — 400013 HH SOC

## 2018-04-25 PROCEDURE — G0299 HHS/HOSPICE OF RN EA 15 MIN: HCPCS

## 2018-04-26 NOTE — OP NOTES
[address] Stephania Conner  MR#: [id number]  : 1960  ACCOUNT #: [de-identified]   DATE OF SERVICE: 2018    PREOPERATIVE DIAGNOSIS:  Mitral valve insufficiency, paroxysmal atrial fibrillation. POSTOPERATIVE DIAGNOSIS:  Mitral valve insufficiency, paroxysmal atrial fibrillation. PROCEDURE PERFORMED:  Mitral valve repair with quadrant resection of P2, primary closure, and 32 mm Granado Physio ring annuloplasty; maze procedure with the cryoprobe; right thoracotomy minimal incision; (arterial line and [place]-Avis catheter placed by anesthesia); temporary right ventricular pacing wires. CARDIOPULMONARY BYPASS TIME:  152 minutes. AORTIC CROSSCLAMP TIME:  121 minutes. SURGEON:  Stephanie Gaytan MD     ASSISTANT:  Raul Kim MD     PREOPERATIVE HISTORY:  This is a 60-year-old gentleman who had developed palpitations and during workup he was found to have severe mitral valve insufficiency with an enlarged left atrium. The patient was converted to sinus rhythm with drug therapy; however, a mitral valve repair or replacement as well as a maze procedure was recommended for long-term well-being. WHAT WAS FOUND AND WHAT WAS DONE: The mitral valve was exposed through a minimal incision right thoracotomy with good exposure of the valve. 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 This was excised and closed primarily after the quadrant resection with 5-0 Ethibond suture. Using the cryoprobe, a maze procedure was carried out, isolating in a cryoprobed box both right and left pulmonary veins. PROCEDURE IN DETAIL:  The patient was premedicated and brought to the operating room. The patient was placed supine on the table and appropriate monitoring lines were placed including arterial line and a [place]-Avis catheter.   He was positioned for a right thoracotomy and a double-lumen tube was placed for one-lung ventilation during the operation. The body of both legs were then prepped with Betadine and the patient was draped into a sterile field. A very lateral right thoracotomy incision was made just below the right nipple. The incision was deepened down to the fourth interspace and the fourth interspace was developed both anteriorly and posteriorly with the electrocautery. The right lung was deflated and on entering the chest, we had good exposure of the heart. The cryoprobe was used initially in the opened intercostal space and two below and two above the thoracotomy incision site so as to lead to postoperative anesthesia in this effected dermatome. Retraction sutures were placed. The pericardium was then opened parallel to the phrenic nerve and once again retracted. At this point, we made an incision over the right femoral vessels. The incision was deepened down and we identified both the right common femoral artery and common femoral vein. Heparin 3 mg/kg body weight was given. Pursestring sutures were placed on the artery and vein and then these were cannulated. The patient was then placed on bypass. A cross clamp was placed through the chest wall and then used to occlude the aorta and cardioplegia was given antegrade. We had good drainage and flaccid arrest of the heart. The left atrium was then opened parallel to the interatrial groove. We had satisfactory exposure of the mitral valve. The finding of the valve was described above with a prolapsing P2 segment rendering the valve incompetent. Interrupted sutures were placed circumferentially around the mitral valve annulus and these sutures were retracted to better exposure of the mitral valve. Once again instilling saline into the left ventricle showed that the prolapse and torn chordae to the P2 segment was the culprit lesion.   The quadrant resection was then carried out and then using 5-0 Ethibond, the P1 and P3 leaflets were reapproximated. Following this, an [address] annuloplasty ring was brought to the field. Sizing had been 32 mm and individual sutures which had been placed circumferentially through the annulus were then passed through the annuloplasty ring. The ring was then lowered into position and all sutures were secured with a Cor-Knot device. After the posterior leaflet repair and after the annuloplasty ring was in place, instilling saline into the left ventricle showed no mitral valve regurgitation. Therefore, the atriotomy was closed with a Prolene suture. We then carried out maneuver to remove all air best we could before releasing the crossclamp and the patient was then warmed and weaned from bypass. At this point, transesophageal echocardiogram showed a good mitral valve repair. Both cannulation cannulas through the femoral vein and artery were removed and the pursestring suture tied. At this point, protamine was then given to reverse the heparin. Satisfied with hemostasis, a 32 chest tube was placed to drain the right chest cavity and then a Joey tube was placed in the pericardium. It should be stated that a bipolar pacing wire had been placed on the right ventricle prior to coming off bypass. The right lung was then inflated. Hemostasis was satisfactory. Attention was turned to closure. #1 Ethibond was used for pericostal suture. Soft tissues were then closed with Vicryl and the skin was closed with Vicryl using subcuticular closure technique. In a similar fashion, the groin was closed anatomically with Vicryl sutures in the subcutaneous tissues, and a Vicryl was used to close the skin. At this point, the sterile dressings were applied and the patient was then taken to the Intensive Care Unit in stable condition. Sponge, needle, and instrument counts were correct.      ANESTHESIA: general    ESTIMATED BLOOD LOSS: minimal    SPECIMENS REMOVED: COMPLICATIONS:      IMPLANTS:        MD ERICH Ovalles / JEREMIAS  D: 04/26/2018 13:31     T: 04/26/2018 14:16  JOB #: [id number]  CC: Howard Suarez MD

## 2018-04-30 ENCOUNTER — HOME CARE VISIT (OUTPATIENT)
Dept: SCHEDULING | Facility: HOME HEALTH | Age: 58
End: 2018-04-30
Payer: COMMERCIAL

## 2018-04-30 VITALS
TEMPERATURE: 98.4 F | SYSTOLIC BLOOD PRESSURE: 122 MMHG | DIASTOLIC BLOOD PRESSURE: 78 MMHG | RESPIRATION RATE: 16 BRPM | HEART RATE: 98 BPM

## 2018-04-30 VITALS
TEMPERATURE: 98.7 F | HEART RATE: 92 BPM | SYSTOLIC BLOOD PRESSURE: 108 MMHG | DIASTOLIC BLOOD PRESSURE: 68 MMHG | OXYGEN SATURATION: 98 % | RESPIRATION RATE: 16 BRPM

## 2018-04-30 PROCEDURE — G0151 HHCP-SERV OF PT,EA 15 MIN: HCPCS

## 2018-04-30 PROCEDURE — G0299 HHS/HOSPICE OF RN EA 15 MIN: HCPCS

## 2018-05-02 ENCOUNTER — HOME CARE VISIT (OUTPATIENT)
Dept: HOME HEALTH SERVICES | Facility: HOME HEALTH | Age: 58
End: 2018-05-02
Payer: COMMERCIAL

## 2018-05-02 ENCOUNTER — HOME CARE VISIT (OUTPATIENT)
Dept: SCHEDULING | Facility: HOME HEALTH | Age: 58
End: 2018-05-02
Payer: COMMERCIAL

## 2018-05-02 VITALS
OXYGEN SATURATION: 98 % | DIASTOLIC BLOOD PRESSURE: 78 MMHG | RESPIRATION RATE: 16 BRPM | SYSTOLIC BLOOD PRESSURE: 112 MMHG | HEART RATE: 92 BPM | TEMPERATURE: 99.1 F

## 2018-05-02 PROCEDURE — G0299 HHS/HOSPICE OF RN EA 15 MIN: HCPCS

## 2018-05-04 ENCOUNTER — HOSPITAL ENCOUNTER (OUTPATIENT)
Dept: CARDIAC REHAB | Age: 58
Discharge: HOME | End: 2018-05-04

## 2018-05-04 NOTE — CARDIO/PULMONARY
Dear Dr. Ward Lopze: Thank you for referring your patient, Darnell Phillips (1960), to the Cardiac Rehabilitation Program at [address] [facility]. Mr. Ashu Richards is a good candidate for the Rehab Program and should see improvements with regular participation. We will be addressing appropriate interventions for modifiable risk factors with your patient during the next 12 weeks. We will contact you with any issues or concerns that may arise, or you can follow your patients progress through [address] [place] [address] at any time. A final summary will be sent to you when the program is completed. Again, thank you for your referral. If we can be of further assistance, please feel free to contact the Cardiopulmonary Rehab staff at [phone].

## 2018-05-09 ENCOUNTER — HOME CARE VISIT (OUTPATIENT)
Dept: SCHEDULING | Facility: HOME HEALTH | Age: 58
End: 2018-05-09
Payer: COMMERCIAL

## 2018-05-10 ENCOUNTER — HOSPITAL ENCOUNTER (OUTPATIENT)
Dept: CARDIAC REHAB | Age: 58
Discharge: HOME | End: 2018-05-10
Payer: COMMERCIAL

## 2018-05-10 VITALS — WEIGHT: 160 LBS | BODY MASS INDEX: 24.25 KG/M2 | HEIGHT: 68 IN

## 2018-05-10 PROBLEM — J98.11 ATELECTASIS: Status: RESOLVED | Noted: 2018-04-20 | Resolved: 2018-05-10

## 2018-05-10 PROBLEM — R09.02 HYPOXIA: Status: RESOLVED | Noted: 2018-04-19 | Resolved: 2018-05-10

## 2018-05-10 PROBLEM — J95.821 RESPIRATORY FAILURE, POST-OPERATIVE (HCC): Status: RESOLVED | Noted: 2018-04-19 | Resolved: 2018-05-10

## 2018-05-10 PROCEDURE — 93798 PHYS/QHP OP CAR RHAB W/ECG: CPT

## 2018-05-14 ENCOUNTER — HOSPITAL ENCOUNTER (OUTPATIENT)
Dept: CARDIAC REHAB | Age: 58
Discharge: HOME | End: 2018-05-14
Payer: COMMERCIAL

## 2018-05-14 PROCEDURE — 93798 PHYS/QHP OP CAR RHAB W/ECG: CPT

## 2018-05-16 ENCOUNTER — HOSPITAL ENCOUNTER (OUTPATIENT)
Dept: CARDIAC REHAB | Age: 58
Discharge: HOME | End: 2018-05-16
Payer: COMMERCIAL

## 2018-05-16 VITALS — WEIGHT: 159.2 LBS | BODY MASS INDEX: 24.21 KG/M2

## 2018-05-16 PROCEDURE — 93798 PHYS/QHP OP CAR RHAB W/ECG: CPT

## 2018-05-18 ENCOUNTER — HOSPITAL ENCOUNTER (OUTPATIENT)
Dept: CARDIAC REHAB | Age: 58
Discharge: HOME | End: 2018-05-18
Payer: COMMERCIAL

## 2018-05-18 PROCEDURE — 93798 PHYS/QHP OP CAR RHAB W/ECG: CPT

## 2018-05-21 ENCOUNTER — HOSPITAL ENCOUNTER (OUTPATIENT)
Dept: CARDIAC REHAB | Age: 58
Discharge: HOME | End: 2018-05-21
Payer: COMMERCIAL

## 2018-05-21 PROCEDURE — 93798 PHYS/QHP OP CAR RHAB W/ECG: CPT

## 2018-05-23 ENCOUNTER — APPOINTMENT (OUTPATIENT)
Dept: CARDIAC REHAB | Age: 58
End: 2018-05-23
Payer: COMMERCIAL

## 2018-05-24 ENCOUNTER — APPOINTMENT (OUTPATIENT)
Dept: CARDIAC REHAB | Age: 58
End: 2018-05-24
Payer: COMMERCIAL

## 2018-05-30 ENCOUNTER — HOSPITAL ENCOUNTER (OUTPATIENT)
Dept: CARDIAC REHAB | Age: 58
Discharge: HOME | End: 2018-05-30
Payer: COMMERCIAL

## 2018-05-30 PROCEDURE — 93798 PHYS/QHP OP CAR RHAB W/ECG: CPT

## 2018-06-01 ENCOUNTER — HOSPITAL ENCOUNTER (OUTPATIENT)
Dept: CARDIAC REHAB | Age: 58
Discharge: HOME | End: 2018-06-01
Payer: COMMERCIAL

## 2018-06-01 PROCEDURE — 93798 PHYS/QHP OP CAR RHAB W/ECG: CPT

## 2018-06-04 ENCOUNTER — HOSPITAL ENCOUNTER (OUTPATIENT)
Dept: CARDIAC REHAB | Age: 58
Discharge: HOME | End: 2018-06-04
Payer: COMMERCIAL

## 2018-06-04 VITALS — BODY MASS INDEX: 24.18 KG/M2 | WEIGHT: 159 LBS

## 2018-06-04 PROCEDURE — 93798 PHYS/QHP OP CAR RHAB W/ECG: CPT

## 2018-06-06 ENCOUNTER — HOSPITAL ENCOUNTER (OUTPATIENT)
Dept: CARDIAC REHAB | Age: 58
Discharge: HOME | End: 2018-06-06
Payer: COMMERCIAL

## 2018-06-06 VITALS — BODY MASS INDEX: 24.27 KG/M2 | WEIGHT: 159.6 LBS

## 2018-06-06 PROCEDURE — 93798 PHYS/QHP OP CAR RHAB W/ECG: CPT

## 2018-06-08 ENCOUNTER — HOSPITAL ENCOUNTER (OUTPATIENT)
Dept: CARDIAC REHAB | Age: 58
Discharge: HOME | End: 2018-06-08
Payer: COMMERCIAL

## 2018-06-08 PROCEDURE — 93798 PHYS/QHP OP CAR RHAB W/ECG: CPT

## 2018-06-11 ENCOUNTER — HOSPITAL ENCOUNTER (OUTPATIENT)
Dept: CARDIAC REHAB | Age: 58
Discharge: HOME | End: 2018-06-11
Payer: COMMERCIAL

## 2018-06-11 PROCEDURE — 93798 PHYS/QHP OP CAR RHAB W/ECG: CPT

## 2018-06-13 ENCOUNTER — APPOINTMENT (OUTPATIENT)
Dept: CARDIAC REHAB | Age: 58
End: 2018-06-13
Payer: COMMERCIAL

## 2018-06-15 ENCOUNTER — HOSPITAL ENCOUNTER (OUTPATIENT)
Dept: CARDIAC REHAB | Age: 58
Discharge: HOME | End: 2018-06-15
Payer: COMMERCIAL

## 2018-06-15 PROCEDURE — 93798 PHYS/QHP OP CAR RHAB W/ECG: CPT

## 2018-06-18 ENCOUNTER — HOSPITAL ENCOUNTER (OUTPATIENT)
Dept: CARDIAC REHAB | Age: 58
Discharge: HOME | End: 2018-06-18
Payer: COMMERCIAL

## 2018-06-18 PROCEDURE — 93798 PHYS/QHP OP CAR RHAB W/ECG: CPT

## 2018-06-18 NOTE — PROGRESS NOTES
Client History:  Current Medical Diagnosis: see ITP  Pertinent Medications: see review PTA meds      Have you gained or lost any weight in the past 3 months: gained wt from inactivity before MVR and now lost 9 lbs. What do you attribute it to: gained wt due to inactivity and now losing wt due to exercising  What is your weight goal: see cardiac ITP    Have you made any changes in your eating habits since your heart problems began: increased almond intake and black beans    Describe your appetite: good    Supplements/Vitamins/Herbs: not presently    Food allergies: no    Problems with digestion, N/V/C/D: no    Alcohol use: see cardiac ITP      Dietary recall:  Otto bar and 16 oz OJ/pineapple juice or 2 eggs  Steamed brocolli and black beans for lunch  24 unsalted almonds for afternoon snack  Dinner is fish or turkey and spinach 12 inch sub from subway with [facility] and mustard   Snack is plain cheerios  Very little added salt   Water intake>67 oz up to 135 oz when he runs          Anthropometric Data: see cardiac ITP  Ht:   Wt:   Age:  BMI:  Waist measurement:     Estimated Nutritional Needs:  Calories: 25 kcal/kg RLU=3141 kcals  Protein: 1gm/kg IBW=70 gm   CHO:   Fluids: 1ml/kcal unless restricted by MD      Nutrition Diagnosis:  Food and nutrition knowledge deficit related to therapeutic diet as evidenced by lack of formal prior education and diet low in calcium and protein. Nutrition Intervention:  Reviewed lab/body comp results/goals. Reviewed rate your plate and heart healthy choices. Reviewed fiber and sodium guidelines. Reviewed label reading. Reviewed herbs and spices to use to flavor instead of salt. Reviewed calcium needs and protein needs and food sources. Affirmed that pt is drinking adequate water. Answered pt's questions on processed foods.     Handouts: lab/body comp, heart healthy, grocery guide, fiber, herbs and spices, recipes, recipe modification tips, plate method, fast food booklet    Goals: See cardiac ITP         Monitoring/Evaluation: Follow-up appointment: RD to follow up with participant during cardiac rehab sessions for questions and assessment of progression toward goals.     Dena Gibbons, AGUILA, LD, CDE  Phone: [phone]

## 2018-06-20 ENCOUNTER — HOSPITAL ENCOUNTER (OUTPATIENT)
Dept: CARDIAC REHAB | Age: 58
Discharge: HOME | End: 2018-06-20
Payer: COMMERCIAL

## 2018-06-20 PROCEDURE — 93798 PHYS/QHP OP CAR RHAB W/ECG: CPT

## 2018-06-22 ENCOUNTER — HOSPITAL ENCOUNTER (OUTPATIENT)
Dept: CARDIAC REHAB | Age: 58
Discharge: HOME | End: 2018-06-22
Payer: COMMERCIAL

## 2018-06-22 VITALS — WEIGHT: 159.6 LBS | BODY MASS INDEX: 24.27 KG/M2

## 2018-06-22 PROCEDURE — 93798 PHYS/QHP OP CAR RHAB W/ECG: CPT

## 2018-06-25 ENCOUNTER — HOSPITAL ENCOUNTER (OUTPATIENT)
Dept: CARDIAC REHAB | Age: 58
Discharge: HOME | End: 2018-06-25
Payer: COMMERCIAL

## 2018-06-25 PROCEDURE — 93798 PHYS/QHP OP CAR RHAB W/ECG: CPT

## 2018-06-27 ENCOUNTER — HOSPITAL ENCOUNTER (OUTPATIENT)
Dept: CARDIAC REHAB | Age: 58
Discharge: HOME | End: 2018-06-27
Payer: COMMERCIAL

## 2018-06-27 VITALS — WEIGHT: 160 LBS | BODY MASS INDEX: 24.33 KG/M2

## 2018-06-27 PROCEDURE — 93798 PHYS/QHP OP CAR RHAB W/ECG: CPT

## 2018-06-29 ENCOUNTER — HOSPITAL ENCOUNTER (OUTPATIENT)
Dept: CARDIAC REHAB | Age: 58
Discharge: HOME | End: 2018-06-29
Payer: COMMERCIAL

## 2018-06-29 PROCEDURE — 93798 PHYS/QHP OP CAR RHAB W/ECG: CPT

## 2018-07-02 ENCOUNTER — HOSPITAL ENCOUNTER (OUTPATIENT)
Dept: CARDIAC REHAB | Age: 58
Discharge: HOME | End: 2018-07-02
Payer: COMMERCIAL

## 2018-07-02 PROCEDURE — 93798 PHYS/QHP OP CAR RHAB W/ECG: CPT

## 2018-07-06 ENCOUNTER — HOSPITAL ENCOUNTER (OUTPATIENT)
Dept: CARDIAC REHAB | Age: 58
Discharge: HOME | End: 2018-07-06
Payer: COMMERCIAL

## 2018-07-06 VITALS — BODY MASS INDEX: 23.87 KG/M2 | WEIGHT: 157 LBS

## 2018-07-06 PROCEDURE — 93798 PHYS/QHP OP CAR RHAB W/ECG: CPT

## 2018-07-09 ENCOUNTER — HOSPITAL ENCOUNTER (OUTPATIENT)
Dept: CARDIAC REHAB | Age: 58
End: 2018-07-09
Payer: COMMERCIAL

## 2018-07-11 ENCOUNTER — APPOINTMENT (OUTPATIENT)
Dept: CARDIAC REHAB | Age: 58
End: 2018-07-11
Payer: COMMERCIAL

## 2018-07-13 ENCOUNTER — APPOINTMENT (OUTPATIENT)
Dept: CARDIAC REHAB | Age: 58
End: 2018-07-13
Payer: COMMERCIAL

## 2018-07-16 ENCOUNTER — APPOINTMENT (OUTPATIENT)
Dept: CARDIAC REHAB | Age: 58
End: 2018-07-16
Payer: COMMERCIAL

## 2018-07-18 ENCOUNTER — APPOINTMENT (OUTPATIENT)
Dept: CARDIAC REHAB | Age: 58
End: 2018-07-18
Payer: COMMERCIAL

## 2018-07-20 ENCOUNTER — APPOINTMENT (OUTPATIENT)
Dept: CARDIAC REHAB | Age: 58
End: 2018-07-20
Payer: COMMERCIAL

## 2018-07-23 ENCOUNTER — APPOINTMENT (OUTPATIENT)
Dept: CARDIAC REHAB | Age: 58
End: 2018-07-23
Payer: COMMERCIAL

## 2018-07-25 ENCOUNTER — APPOINTMENT (OUTPATIENT)
Dept: CARDIAC REHAB | Age: 58
End: 2018-07-25
Payer: COMMERCIAL

## 2018-07-27 ENCOUNTER — APPOINTMENT (OUTPATIENT)
Dept: CARDIAC REHAB | Age: 58
End: 2018-07-27
Payer: COMMERCIAL

## 2018-07-30 ENCOUNTER — APPOINTMENT (OUTPATIENT)
Dept: CARDIAC REHAB | Age: 58
End: 2018-07-30
Payer: COMMERCIAL

## 2018-08-01 ENCOUNTER — APPOINTMENT (OUTPATIENT)
Dept: CARDIAC REHAB | Age: 58
End: 2018-08-01

## 2018-08-03 ENCOUNTER — APPOINTMENT (OUTPATIENT)
Dept: CARDIAC REHAB | Age: 58
End: 2018-08-03

## 2018-08-03 PROBLEM — I48.0 PAROXYSMAL ATRIAL FIBRILLATION (HCC): Chronic | Status: RESOLVED | Noted: 2018-03-12 | Resolved: 2018-08-03

## 2019-06-17 PROBLEM — I34.0 NON-RHEUMATIC MITRAL REGURGITATION: Status: ACTIVE | Noted: 2019-06-17

## 2019-06-25 ENCOUNTER — HOSPITAL ENCOUNTER (OUTPATIENT)
Dept: CT IMAGING | Age: 59
Discharge: HOME | End: 2019-06-25
Attending: NURSE PRACTITIONER

## 2019-06-25 DIAGNOSIS — Z98.890 S/P MVR (MITRAL VALVE REPAIR): ICD-10-CM

## 2019-06-25 DIAGNOSIS — I34.0 NON-RHEUMATIC MITRAL REGURGITATION: ICD-10-CM

## 2019-06-25 DIAGNOSIS — I48.0 PAROXYSMAL ATRIAL FIBRILLATION (HCC): ICD-10-CM

## 2019-06-25 RX ORDER — SODIUM CHLORIDE 0.9 % (FLUSH) 0.9 %
10 SYRINGE (ML) INJECTION
Status: COMPLETED | OUTPATIENT
Start: 2019-06-25 | End: 2019-06-25

## 2019-06-25 RX ADMIN — Medication 10 ML: at 11:07

## 2019-08-24 ENCOUNTER — APPOINTMENT (OUTPATIENT)
Dept: GENERAL RADIOLOGY | Age: 59
End: 2019-08-24
Attending: EMERGENCY MEDICINE
Payer: COMMERCIAL

## 2019-08-24 ENCOUNTER — HOSPITAL ENCOUNTER (EMERGENCY)
Age: 59
Discharge: HOME | End: 2019-08-24
Attending: EMERGENCY MEDICINE
Payer: COMMERCIAL

## 2019-08-24 VITALS
HEART RATE: 81 BPM | DIASTOLIC BLOOD PRESSURE: 65 MMHG | OXYGEN SATURATION: 96 % | WEIGHT: 162 LBS | HEIGHT: 68 IN | TEMPERATURE: 98.2 F | BODY MASS INDEX: 24.55 KG/M2 | SYSTOLIC BLOOD PRESSURE: 108 MMHG | RESPIRATION RATE: 14 BRPM

## 2019-08-24 DIAGNOSIS — I48.0 PAROXYSMAL A-FIB (HCC): Primary | ICD-10-CM

## 2019-08-24 PROBLEM — I48.91 ATRIAL FIBRILLATION WITH RVR (HCC): Status: ACTIVE | Noted: 2019-08-24

## 2019-08-24 LAB
ALBUMIN SERPL-MCNC: 4.4 G/DL (ref 3.5–5)
ALBUMIN/GLOB SERPL: 1.3 {RATIO} (ref 1.2–3.5)
ALP SERPL-CCNC: 67 U/L (ref 50–136)
ALT SERPL-CCNC: 26 U/L (ref 12–65)
ANION GAP SERPL CALC-SCNC: 9 MMOL/L (ref 7–16)
AST SERPL-CCNC: 30 U/L (ref 15–37)
BASOPHILS # BLD: 0.1 K/UL (ref 0–0.2)
BASOPHILS NFR BLD: 1 % (ref 0–2)
BILIRUB SERPL-MCNC: 0.5 MG/DL (ref 0.2–1.1)
BNP SERPL-MCNC: 37 PG/ML
BUN SERPL-MCNC: 17 MG/DL (ref 6–23)
CALCIUM SERPL-MCNC: 9.3 MG/DL (ref 8.3–10.4)
CHLORIDE SERPL-SCNC: 108 MMOL/L (ref 98–107)
CO2 SERPL-SCNC: 24 MMOL/L (ref 21–32)
CREAT SERPL-MCNC: 0.76 MG/DL (ref 0.8–1.5)
DIFFERENTIAL METHOD BLD: ABNORMAL
EOSINOPHIL # BLD: 0.1 K/UL (ref 0–0.8)
EOSINOPHIL NFR BLD: 1 % (ref 0.5–7.8)
ERYTHROCYTE [DISTWIDTH] IN BLOOD BY AUTOMATED COUNT: 12.3 % (ref 11.9–14.6)
GLOBULIN SER CALC-MCNC: 3.3 G/DL (ref 2.3–3.5)
GLUCOSE SERPL-MCNC: 92 MG/DL (ref 65–100)
HCT VFR BLD AUTO: 46.7 % (ref 41.1–50.3)
HGB BLD-MCNC: 16.7 G/DL (ref 13.6–17.2)
IMM GRANULOCYTES # BLD AUTO: 0 K/UL (ref 0–0.5)
IMM GRANULOCYTES NFR BLD AUTO: 0 % (ref 0–5)
LYMPHOCYTES # BLD: 2.1 K/UL (ref 0.5–4.6)
LYMPHOCYTES NFR BLD: 26 % (ref 13–44)
MAGNESIUM SERPL-MCNC: 2.5 MG/DL (ref 1.8–2.4)
MCH RBC QN AUTO: 31.1 PG (ref 26.1–32.9)
MCHC RBC AUTO-ENTMCNC: 35.8 G/DL (ref 31.4–35)
MCV RBC AUTO: 87 FL (ref 79.6–97.8)
MONOCYTES # BLD: 0.7 K/UL (ref 0.1–1.3)
MONOCYTES NFR BLD: 8 % (ref 4–12)
NEUTS SEG # BLD: 5.2 K/UL (ref 1.7–8.2)
NEUTS SEG NFR BLD: 65 % (ref 43–78)
NRBC # BLD: 0 K/UL (ref 0–0.2)
PLATELET # BLD AUTO: 280 K/UL (ref 150–450)
PMV BLD AUTO: 8.8 FL (ref 9.4–12.3)
POTASSIUM SERPL-SCNC: 3.5 MMOL/L (ref 3.5–5.1)
PROT SERPL-MCNC: 7.7 G/DL (ref 6.3–8.2)
RBC # BLD AUTO: 5.37 M/UL (ref 4.23–5.6)
SODIUM SERPL-SCNC: 141 MMOL/L (ref 136–145)
TROPONIN I BLD-MCNC: 0 NG/ML (ref 0.02–0.05)
WBC # BLD AUTO: 8 K/UL (ref 4.3–11.1)

## 2019-08-24 PROCEDURE — 99285 EMERGENCY DEPT VISIT HI MDM: CPT | Performed by: EMERGENCY MEDICINE

## 2019-08-24 PROCEDURE — 93005 ELECTROCARDIOGRAM TRACING: CPT | Performed by: EMERGENCY MEDICINE

## 2019-08-24 PROCEDURE — 71046 X-RAY EXAM CHEST 2 VIEWS: CPT

## 2019-08-24 PROCEDURE — 74011250637 HC RX REV CODE- 250/637: Performed by: NURSE PRACTITIONER

## 2019-08-24 PROCEDURE — 96374 THER/PROPH/DIAG INJ IV PUSH: CPT | Performed by: EMERGENCY MEDICINE

## 2019-08-24 PROCEDURE — 85025 COMPLETE CBC W/AUTO DIFF WBC: CPT

## 2019-08-24 PROCEDURE — 84484 ASSAY OF TROPONIN QUANT: CPT

## 2019-08-24 PROCEDURE — 80053 COMPREHEN METABOLIC PANEL: CPT

## 2019-08-24 PROCEDURE — 83880 ASSAY OF NATRIURETIC PEPTIDE: CPT

## 2019-08-24 PROCEDURE — 74011250636 HC RX REV CODE- 250/636: Performed by: EMERGENCY MEDICINE

## 2019-08-24 PROCEDURE — 83735 ASSAY OF MAGNESIUM: CPT

## 2019-08-24 RX ORDER — DILTIAZEM HYDROCHLORIDE 5 MG/ML
10 INJECTION INTRAVENOUS ONCE
Status: DISCONTINUED | OUTPATIENT
Start: 2019-08-24 | End: 2019-08-24

## 2019-08-24 RX ORDER — METOPROLOL SUCCINATE 50 MG/1
25 TABLET, EXTENDED RELEASE ORAL
Status: COMPLETED | OUTPATIENT
Start: 2019-08-24 | End: 2019-08-24

## 2019-08-24 RX ORDER — LOSARTAN POTASSIUM 50 MG/1
50 TABLET ORAL DAILY
Qty: 90 TAB | Refills: 3 | Status: SHIPPED | OUTPATIENT
Start: 2019-08-24 | End: 2019-08-27 | Stop reason: ALTCHOICE

## 2019-08-24 RX ORDER — METOPROLOL SUCCINATE 25 MG/1
25 TABLET, EXTENDED RELEASE ORAL DAILY
Qty: 30 TAB | Refills: 6 | Status: SHIPPED | OUTPATIENT
Start: 2019-08-24 | End: 2019-11-04 | Stop reason: SDUPTHER

## 2019-08-24 RX ORDER — LORAZEPAM 2 MG/ML
1 INJECTION INTRAMUSCULAR
Status: COMPLETED | OUTPATIENT
Start: 2019-08-24 | End: 2019-08-24

## 2019-08-24 RX ADMIN — LORAZEPAM 1 MG: 2 INJECTION INTRAMUSCULAR; INTRAVENOUS at 19:26

## 2019-08-24 RX ADMIN — METOPROLOL SUCCINATE 25 MG: 50 TABLET, EXTENDED RELEASE ORAL at 22:31

## 2019-08-24 RX ADMIN — APIXABAN 5 MG: 5 TABLET, FILM COATED ORAL at 22:31

## 2019-08-24 NOTE — ED TRIAGE NOTES
Pt states he was sent here by cardiologist because he woke up today and did not feel well. Pt states he felt his heart palpitating and had an episode where his heart rate went up to 140 and then went down to 60 between 3 and 4 today. States he was able to watch his heart rate on his watch. Pt states he had an ECHO recently and was told there were some abnormal findings with his previous valve replacement and is awaiting an appt with a cardiac surgeon in [facility]. States hx of afib. No pacemaker/defibrillator present currently.

## 2019-08-24 NOTE — ED NOTES
Report given to [facility] RN and Ron, [facility]. Care transferred at this time.

## 2019-08-24 NOTE — ED PROVIDER NOTES
Presents with complaint of palpitations. Patient reports around 3:00 he noticed his heart rate being erratic to 130s. He would rise and fall depending on activity but was consistently irregular. Similar to the A. fib he has had in the past.  He has had a maze procedure. He is not on any blood thinners or rate control medication. He has a history of mitral valve prolapse with repair is failed and is now going to [facility] for possible revision. He reports his anxiety has intensified with his A. fib. He denies any chest pain or shortness of breath. The history is provided by the patient. Palpitations    This is a new problem. Episode onset: 1500. The problem has not changed since onset. The problem occurs constantly. Associated symptoms include irregular heartbeat. Pertinent negatives include no diaphoresis, no fever, no chest pain, no chest pressure, no nausea, no vomiting, no lower extremity edema, no dizziness, no cough and no shortness of breath. Risk factors include cardiac disease. His past medical history is significant for atrial fibrillation.         Past Medical History:   Diagnosis Date    Allergic rhinitis due to other allergen 2/25/2015    Anticoagulated 4/3/2018    Anxiety state, unspecified 2/25/2015    CAD (coronary artery disease)     mild non obstructive , awaitng Maze for mitral valve repair/replacement    Depression     started with loss of parents 2010    First degree hemorrhoids 2/20/2017    Insomnia, unspecified 2/25/2015    Lipoma 2/25/2015    Mitral valve regurgitation     Paroxysmal A-fib ([address]          Unspecified adjustment reaction 2/25/2015       Past Surgical History:   Procedure Laterality Date    ECHO TRANSESOPHAGEAL ([facility]) W OR WO CONTRAST  3/15/2018         HX HEART CATHETERIZATION  03/15/2018    HX HEENT  1996    wisdom teeth ext    HX ORTHOPAEDIC  late 1990's    cyst from left ring finger     HX WISDOM TEETH EXTRACTION           Family History:   Problem Relation Age of Onset    Alcohol abuse Mother     Heart Failure Paternal Grandfather     Coronary Artery Disease Paternal Grandfather        Social History     Socioeconomic History    Marital status:      Spouse name: Not on file    Number of children: Not on file    Years of education: Not on file    Highest education level: Not on file   Occupational History    Occupation:  at [address] [place] [address] resource strain: Not on file    Food insecurity:     Worry: Not on file     Inability: Not on file    Transportation needs:     Medical: Not on file     Non-medical: Not on file   Tobacco Use    Smoking status: Never Smoker    Smokeless tobacco: Never Used   Substance and Sexual Activity    Alcohol use: No    Drug use: No    Sexual activity: Yes     Partners: Female   Lifestyle    Physical activity:     Days per week: Not on file     Minutes per session: Not on file    Stress: Not on file   Relationships    Social connections:     Talks on phone: Not on file     Gets together: Not on file     Attends Confucianist service: Not on file     Active member of club or organization: Not on file     Attends meetings of clubs or organizations: Not on file     Relationship status: Not on file    Intimate partner violence:     Fear of current or ex partner: Not on file     Emotionally abused: Not on file     Physically abused: Not on file     Forced sexual activity: Not on file   Other Topics Concern    Not on file   Social History Narrative    . Lives with wife         ALLERGIES: Patient has no known allergies. Review of Systems   Constitutional: Negative for diaphoresis and fever. Respiratory: Negative for cough and shortness of breath. Cardiovascular: Positive for palpitations. Negative for chest pain. Gastrointestinal: Negative for nausea and vomiting. Neurological: Negative for dizziness. All other systems reviewed and are negative.       Vitals: 08/24/19 1737   BP: 96/58   Pulse: (!) 101   Resp: 22   Temp: 98.6 °F (37 °C)   SpO2: 97%   Weight: 73.5 kg (162 lb)   Height: 5' 8\" (1.727 m)            Physical Exam   Constitutional: He is oriented to person, place, and time. He appears well-developed and well-nourished. No distress. HENT:   Head: Normocephalic and atraumatic. Eyes: Conjunctivae are normal. Right eye exhibits no discharge. Left eye exhibits no discharge. Neck: Normal range of motion. Neck supple. Cardiovascular: An irregularly irregular rhythm present. Tachycardia present. Murmur heard. Pulmonary/Chest: Effort normal and breath sounds normal. No stridor. No respiratory distress. He has no wheezes. Abdominal: Soft. He exhibits no distension. There is no tenderness. Musculoskeletal: Normal range of motion. He exhibits no edema. Neurological: He is alert and oriented to person, place, and time. No cranial nerve deficit. Skin: Skin is warm and dry. Capillary refill takes less than 2 seconds. He is not diaphoretic. Psychiatric: He has a normal mood and affect. His behavior is normal.   Nursing note and vitals reviewed. MDM  Number of Diagnoses or Management Options  Paroxysmal A-fib [facility]):   Diagnosis management comments: His rate improved without any intervention and A. fib changed to a flutter. Ativan ordered. Has hx of intermittent afib/flutter but did not tolerate BB and is currently not on blood thinner. His MVP is now concerning and he has been referred to Dr. Chapo Yap and was seen last Friday. Chart review: \"Feb 2018 7 day monitor - NSR with episodes of atrial tachycardia converting to atypical atrial flutter, then to afib and back to sinus rhythm. Frequently reports fast heart beat and flutter which does correlate with arrhythmia but is also reported during normal sinus rhythm\"    8:10 PM  Pt in sinus not on any bb or blood thinner. D/w cards and wants to see pt. He feels better.       Pt to go home with metoprolol and eliquis per cards.        Amount and/or Complexity of Data Reviewed  Clinical lab tests: ordered and reviewed  Review and summarize past medical records: yes  Discuss the patient with other providers: yes  Independent visualization of images, tracings, or specimens: yes (afib no ST elevation )    Risk of Complications, Morbidity, and/or Mortality  Presenting problems: high  Diagnostic procedures: moderate  Management options: high    Patient Progress  Patient progress: improved         Procedures

## 2019-08-25 LAB
ATRIAL RATE: 147 BPM
ATRIAL RATE: 258 BPM
ATRIAL RATE: 85 BPM
CALCULATED P AXIS, ECG09: 28 DEGREES
CALCULATED R AXIS, ECG10: -22 DEGREES
CALCULATED R AXIS, ECG10: -36 DEGREES
CALCULATED R AXIS, ECG10: 2 DEGREES
CALCULATED T AXIS, ECG11: 61 DEGREES
CALCULATED T AXIS, ECG11: 63 DEGREES
CALCULATED T AXIS, ECG11: 65 DEGREES
DIAGNOSIS, 93000: NORMAL
P-R INTERVAL, ECG05: 156 MS
Q-T INTERVAL, ECG07: 292 MS
Q-T INTERVAL, ECG07: 316 MS
Q-T INTERVAL, ECG07: 386 MS
QRS DURATION, ECG06: 92 MS
QRS DURATION, ECG06: 94 MS
QRS DURATION, ECG06: 96 MS
QTC CALCULATION (BEZET), ECG08: 397 MS
QTC CALCULATION (BEZET), ECG08: 424 MS
QTC CALCULATION (BEZET), ECG08: 459 MS
VENTRICULAR RATE, ECG03: 127 BPM
VENTRICULAR RATE, ECG03: 85 BPM
VENTRICULAR RATE, ECG03: 95 BPM

## 2019-08-25 NOTE — DISCHARGE INSTRUCTIONS

## 2019-08-25 NOTE — H&P
[address]                 Primary Cardiologist: Dr. Jeramie Cuevas    Primary Care Physician: Dr. Adrianna Brandon Physician: Dr. Bryan Stack:     Patient is a 63 y.o.  male with PMHx of MR (s/p Mini MVR with MAZE 4/2018), PAF (s/p MAZE), Depression and Anxiety who presented to the ED with c/o palpitations. States that when he woke up today he overall didn't feel well. Felt more weak than he has recently. States he was in the house with his son when he turned to walk to another room. He felt his heart \"flop\" and then noted tachy-palpitations. Denies associated HA, dizziness, syncope, N/V, CP/pressure or SOB. Noted his HR on his watch was 130s. He states he knew he was in AFib and thus came to the ED. Once in the ED: EKG with AFib . He was given Ativan for his anxiety and he spontaneously converted to NSR HR 80s.  has not had AFib to his knowledge since his surgery in 4/2018. States he is very aware of when he is in AFib.  has not been on meds for AFib since his surgery. Was on Toprol which he states \"worked\" but made him feel fatigued. Was also previously on Eliquis and per records Multaq as well.  had gone for a walk yesterday was felt well. Per records -- he had ECHO 5/2019 that showed severe MR. States that he is scheduled for MALLORY on 9/4/19 and hopefully re-do MVR at [facility] with Dr. Sandy Kang in October.      Past Medical History:   Diagnosis Date    Allergic rhinitis due to other allergen 2/25/2015    Anticoagulated 4/3/2018    Anxiety state, unspecified 2/25/2015    CAD (coronary artery disease)     mild non obstructive , awaitng Maze for mitral valve repair/replacement    Depression     started with loss of parents 2010    First degree hemorrhoids 2/20/2017    Insomnia, unspecified 2/25/2015    Lipoma 2/25/2015    Mitral valve regurgitation     Paroxysmal A-fib ([address]          Unspecified adjustment reaction 2/25/2015      Past Surgical History:   Procedure Laterality Date    ECHO TRANSESOPHAGEAL (MALLORY) W OR WO CONTRAST  3/15/2018         HX HEART CATHETERIZATION  03/15/2018    HX HEENT  1996    wisdom teeth ext    HX ORTHOPAEDIC  late 1990's    cyst from left ring finger     HX WISDOM TEETH EXTRACTION        No Known Allergies  Social History     Tobacco Use    Smoking status: Never Smoker    Smokeless tobacco: Never Used   Substance Use Topics    Alcohol use: No      FH:   Family History   Problem Relation Age of Onset    Alcohol abuse Mother     Heart Failure Paternal Grandfather     Coronary Artery Disease Paternal Grandfather         Review of Systems  General: no weight change, +weakness, +fatigue, no fever or chills  Skin: no rashes, lumps, or other skin changes  HEENT: no headache, dizziness, lightheadedness, vision changes, hearing changes, tinnitus, vertigo, sinus pressure/pain, bleeding gums, sore throat, or hoarseness  Neck: no swollen glands, goiter, pain or stiffness  Respiratory: no cough, sputum, hemoptysis, no dyspnea, no wheezing  Cardiovascular: + as per HPI  Gastrointestinal: no reflux, constipation, diarrhea, liver problems, GI bleeding  Urinary: no frequency, urgency , hematuria, burning/pain with urination, recent flank pain, polyuria, nocturia, or difficulty urinating  Peripheral Vascular: no claudication, leg cramps, prior DVTs, swelling of calves, legs, or feet, color change, or swelling with redness or tenderness  Musculoskeletal: no muscle or joint pain/stiffness, joint swelling, erythema of joints, or back pain  Psychiatric: +depression, +anxiety  Neurological: no sensory or motor loss, seizures, syncope, tremors, numbness, tingling, no changes in mood, attention, or speech, no changes in orientation, memory, insight, or judgment.    Hematologic: no anemia, easy bruising or bleeding  Endocrine: no thyroid problems, no heat or cold intolerance, excessive sweating, polyuria, polydipsia, no diabetes. Objective:       Visit Vitals  /61 (BP 1 Location: Right arm, BP Patient Position: At rest)   Pulse 86   Temp 98.6 °F (37 °C)   Resp 16   Ht 5' 8\" (1.727 m)   Wt 73.5 kg (162 lb)   SpO2 96%   BMI 24.63 kg/m²       No intake/output data recorded. No intake/output data recorded. Physical Exam:  General: Well Developed, Well Nourished, No Acute Distress, Anxious  HEENT: pupils equal and round, no abnormalities noted  Neck: supple, no JVD, no carotid bruits  Heart: S1S2 with RRR, +murmurs, no gallops  Lungs: Clear throughout auscultation bilaterally without adventitious sounds, on RA, normal effort  Abd: soft, nontender, nondistended, with good bowel sounds  Ext: warm, no edema, calves supple/nontender, pulses 2+ bilaterally  Skin: warm and dry  Psychiatric: Normal mood and affect  Neurologic: Alert and oriented X 3      ECG: Initial AFib  -- repeat NS 95, no acute ST changes    Data Review:      Recent Results (from the past 24 hour(s))   EKG, 12 LEAD, INITIAL    Collection Time: 08/24/19  5:40 PM   Result Value Ref Range    Ventricular Rate 127 BPM    Atrial Rate 147 BPM    QRS Duration 92 ms    Q-T Interval 292 ms    QTC Calculation (Bezet) 424 ms    Calculated R Axis -36 degrees    Calculated T Axis 63 degrees    Diagnosis       Atrial fibrillation with rapid ventricular response  Left axis deviation  Abnormal ECG  When compared with ECG of 23-APR-2018 07:05,  Vent.  rate has increased BY  59 BPM  Non-specific change in ST segment in Inferior leads  ST now depressed in Anterior leads     CBC WITH AUTOMATED DIFF    Collection Time: 08/24/19  5:44 PM   Result Value Ref Range    WBC 8.0 4.3 - 11.1 K/uL    RBC 5.37 4.23 - 5.6 M/uL    HGB 16.7 13.6 - 17.2 g/dL    HCT 46.7 41.1 - 50.3 %    MCV 87.0 79.6 - 97.8 FL    MCH 31.1 26.1 - 32.9 PG    MCHC 35.8 (H) 31.4 - 35.0 g/dL    RDW 12.3 11.9 - 14.6 %    PLATELET 383 324 - 512 K/uL    MPV 8.8 (L) 9.4 - 12.3 FL    ABSOLUTE NRBC 0.00 0.0 - 0.2 K/uL    DF AUTOMATED      NEUTROPHILS 65 43 - 78 %    LYMPHOCYTES 26 13 - 44 %    MONOCYTES 8 4.0 - 12.0 %    EOSINOPHILS 1 0.5 - 7.8 %    BASOPHILS 1 0.0 - 2.0 %    IMMATURE GRANULOCYTES 0 0.0 - 5.0 %    ABS. NEUTROPHILS 5.2 1.7 - 8.2 K/UL    ABS. LYMPHOCYTES 2.1 0.5 - 4.6 K/UL    ABS. MONOCYTES 0.7 0.1 - 1.3 K/UL    ABS. EOSINOPHILS 0.1 0.0 - 0.8 K/UL    ABS. BASOPHILS 0.1 0.0 - 0.2 K/UL    ABS. IMM. GRANS. 0.0 0.0 - 0.5 K/UL   METABOLIC PANEL, COMPREHENSIVE    Collection Time: 08/24/19  5:44 PM   Result Value Ref Range    Sodium 141 136 - 145 mmol/L    Potassium 3.5 3.5 - 5.1 mmol/L    Chloride 108 (H) 98 - 107 mmol/L    CO2 24 21 - 32 mmol/L    Anion gap 9 7 - 16 mmol/L    Glucose 92 65 - 100 mg/dL    BUN 17 6 - 23 MG/DL    Creatinine 0.76 (L) 0.8 - 1.5 MG/DL    GFR est AA >60 >60 ml/min/1.73m2    GFR est non-AA >60 >60 ml/min/1.73m2    Calcium 9.3 8.3 - 10.4 MG/DL    Bilirubin, total 0.5 0.2 - 1.1 MG/DL    ALT (SGPT) 26 12 - 65 U/L    AST (SGOT) 30 15 - 37 U/L    Alk.  phosphatase 67 50 - 136 U/L    Protein, total 7.7 6.3 - 8.2 g/dL    Albumin 4.4 3.5 - 5.0 g/dL    Globulin 3.3 2.3 - 3.5 g/dL    A-G Ratio 1.3 1.2 - 3.5     BNP    Collection Time: 08/24/19  5:44 PM   Result Value Ref Range    BNP 37 (H) 0 pg/mL   MAGNESIUM    Collection Time: 08/24/19  5:44 PM   Result Value Ref Range    Magnesium 2.5 (H) 1.8 - 2.4 mg/dL   POC TROPONIN-I    Collection Time: 08/24/19  5:44 PM   Result Value Ref Range    Troponin-I (POC) 0 (L) 0.02 - 0.05 ng/ml       CXR: no acute findings    Assessment/Plan:   Principal Problem:    Atrial fibrillation with RVR -- spontaneous conversion in ED, appears to be first arrhythmia event since prior MVR/MAZE, will give dose Toprol and Eliquis here in ED and resume, good effect from BB prior per Pt, discussed importance of [address]    Active Problems:    Mitral valve regurgitation -- severe MR on last ECHO, pending re-do MVR at [facility] with Dr. Tonie Vaz soon      Anxiety -- chronic and seems to be worse in setting of worsening health      S/P MVR (mitral valve repair) -- valve failing, needs re-do        Will plan for DC from ED and OP f/u. Pt seen by Dr. Cielo Glass in ED. Rx provided for Toprol 25mg and Eliquis 5mg  Will plan to hold Cozaar since started BB and will instruct Pt to resume Cozaar if SBP staying >120. Stephen Araujo NP-C  8/24/2019  8:54 PM     [facility] CARDIOLOGY     8/24/2019     10:16 PM    I have personally seen and examined Francisco LYONSDhara Darrell  III with  Sheyla Morris NP. I agree and confirm findings in history, physical exam, and assessment/plan as outlined above with following pertinent additions/exceptions:   Patient is a 64-year-old male with prior mitral valve repair with surgical MAZE in 4/18. She unfortunately has developed severe mitral regurgitation. He is being evaluated for mitral valve replacement at [facility].  He has had no recurrent atrial fibrillation since his surgery in April 2018. However, today he noted tachycardia and palpitations. His heart rate was 130. He presented to the emergency room where he was found to be in atrial fibrillation with a rapid ventricular response. He spontaneously converted to sinus rhythm. PE: CV: RRR  L: CTA bilaterally  E: No edema  ASS/Plan:  1. Atrial fibrillation:  Converted to sinus rhythm. See above. Start Toprol and Eliquis. Hold Losartan. 2. Mitral regurgitation:  MALLORY planned in coming weeks. Has appt with Dr. Lanette Christine. 3.  ANxiety:  Stable. RE-assurance given.        Darlene Nation MD

## 2019-08-25 NOTE — ED NOTES
I have reviewed discharge instructions with the patient and family member. The patient and family member verbalized understanding. Patient left ED via Discharge Method: ambulatory to Home with family member. Opportunity for questions and clarification provided. Patient given 3 scripts. To continue your aftercare when you leave the hospital, you may receive an automated call from our care team to check in on how you are doing. This is a free service and part of our promise to provide the best care and service to meet your aftercare needs.  If you have questions, or wish to unsubscribe from this service please call [phone]. Thank you for Choosing our [facility] Emergency Department.

## 2019-08-27 PROBLEM — F41.9 CHRONIC ANXIETY: Status: RESOLVED | Noted: 2018-04-20 | Resolved: 2018-05-10

## 2019-09-03 NOTE — PROGRESS NOTES
Patient pre-assessment complete for MALLORY with Dr Nadia Moran scheduled for 19 at 8am, arrival time 7am. Patient verified using . Patient instructed to bring all home medications in labeled bottles on the day of procedure. NPO status reinforced. Instructed they can take all other medications excluding vitamins & supplements. Patient verbalizes understanding of all instructions & denies any questions at this time.

## 2019-09-04 ENCOUNTER — HOSPITAL ENCOUNTER (OUTPATIENT)
Dept: CARDIAC CATH/INVASIVE PROCEDURES | Age: 59
Discharge: HOME | End: 2019-09-04
Attending: INTERNAL MEDICINE | Admitting: INTERNAL MEDICINE
Payer: COMMERCIAL

## 2019-09-04 VITALS
WEIGHT: 162 LBS | BODY MASS INDEX: 24.55 KG/M2 | TEMPERATURE: 97.8 F | DIASTOLIC BLOOD PRESSURE: 57 MMHG | OXYGEN SATURATION: 95 % | SYSTOLIC BLOOD PRESSURE: 99 MMHG | HEIGHT: 68 IN | RESPIRATION RATE: 8 BRPM | HEART RATE: 66 BPM

## 2019-09-04 LAB
ATRIAL RATE: 68 BPM
CALCULATED P AXIS, ECG09: 54 DEGREES
CALCULATED R AXIS, ECG10: 17 DEGREES
CALCULATED T AXIS, ECG11: 64 DEGREES
DIAGNOSIS, 93000: NORMAL
P-R INTERVAL, ECG05: 194 MS
Q-T INTERVAL, ECG07: 428 MS
QRS DURATION, ECG06: 94 MS
QTC CALCULATION (BEZET), ECG08: 455 MS
VENTRICULAR RATE, ECG03: 68 BPM

## 2019-09-04 PROCEDURE — 99153 MOD SED SAME PHYS/QHP EA: CPT

## 2019-09-04 PROCEDURE — 93312 ECHO TRANSESOPHAGEAL: CPT

## 2019-09-04 PROCEDURE — 99152 MOD SED SAME PHYS/QHP 5/>YRS: CPT

## 2019-09-04 PROCEDURE — 74011250636 HC RX REV CODE- 250/636

## 2019-09-04 PROCEDURE — 74011000250 HC RX REV CODE- 250: Performed by: INTERNAL MEDICINE

## 2019-09-04 PROCEDURE — 93005 ELECTROCARDIOGRAM TRACING: CPT | Performed by: INTERNAL MEDICINE

## 2019-09-04 RX ORDER — SODIUM CHLORIDE 9 MG/ML
75 INJECTION, SOLUTION INTRAVENOUS CONTINUOUS
Status: DISCONTINUED | OUTPATIENT
Start: 2019-09-04 | End: 2019-09-04 | Stop reason: HOSPADM

## 2019-09-04 RX ORDER — MIDAZOLAM HYDROCHLORIDE 1 MG/ML
.5-2 INJECTION, SOLUTION INTRAMUSCULAR; INTRAVENOUS
Status: DISCONTINUED | OUTPATIENT
Start: 2019-09-04 | End: 2019-09-04 | Stop reason: HOSPADM

## 2019-09-04 RX ORDER — SODIUM CHLORIDE 0.9 % (FLUSH) 0.9 %
5 SYRINGE (ML) INJECTION AS NEEDED
Status: DISCONTINUED | OUTPATIENT
Start: 2019-09-04 | End: 2019-09-04 | Stop reason: HOSPADM

## 2019-09-04 RX ORDER — LIDOCAINE HYDROCHLORIDE 20 MG/ML
15 SOLUTION OROPHARYNGEAL AS NEEDED
Status: DISCONTINUED | OUTPATIENT
Start: 2019-09-04 | End: 2019-09-04 | Stop reason: HOSPADM

## 2019-09-04 RX ORDER — FENTANYL CITRATE 50 UG/ML
25-50 INJECTION, SOLUTION INTRAMUSCULAR; INTRAVENOUS
Status: DISCONTINUED | OUTPATIENT
Start: 2019-09-04 | End: 2019-09-04 | Stop reason: HOSPADM

## 2019-09-04 RX ADMIN — LIDOCAINE HYDROCHLORIDE 15 ML: 20 SOLUTION ORAL; TOPICAL at 08:25

## 2019-09-04 RX ADMIN — MIDAZOLAM HYDROCHLORIDE 1 MG: 1 INJECTION, SOLUTION INTRAMUSCULAR; INTRAVENOUS at 08:35

## 2019-09-04 RX ADMIN — MIDAZOLAM HYDROCHLORIDE 1 MG: 1 INJECTION, SOLUTION INTRAMUSCULAR; INTRAVENOUS at 08:29

## 2019-09-04 RX ADMIN — MIDAZOLAM HYDROCHLORIDE 1 MG: 1 INJECTION, SOLUTION INTRAMUSCULAR; INTRAVENOUS at 08:32

## 2019-09-04 RX ADMIN — FENTANYL CITRATE 50 MCG: 50 INJECTION, SOLUTION INTRAMUSCULAR; INTRAVENOUS at 08:45

## 2019-09-04 RX ADMIN — MIDAZOLAM HYDROCHLORIDE 1 MG: 1 INJECTION, SOLUTION INTRAMUSCULAR; INTRAVENOUS at 08:45

## 2019-09-04 RX ADMIN — FENTANYL CITRATE 25 MCG: 50 INJECTION, SOLUTION INTRAMUSCULAR; INTRAVENOUS at 08:32

## 2019-09-04 RX ADMIN — MIDAZOLAM HYDROCHLORIDE 1 MG: 1 INJECTION, SOLUTION INTRAMUSCULAR; INTRAVENOUS at 08:26

## 2019-09-04 RX ADMIN — FENTANYL CITRATE 25 MCG: 50 INJECTION, SOLUTION INTRAMUSCULAR; INTRAVENOUS at 08:29

## 2019-09-04 RX ADMIN — FENTANYL CITRATE 50 MCG: 50 INJECTION, SOLUTION INTRAMUSCULAR; INTRAVENOUS at 08:26

## 2019-09-04 NOTE — PROGRESS NOTES
Patient received to [address] [place] [address] room # 8  Ambulatory from [facility]. Patient scheduled for MALLORY today with Dr Maci Holland. Procedure reviewed & questions answered, voiced good understanding consent obtained & placed on chart. All medications and medical history reviewed. Will prep patient per orders. Patient & family updated on plan of care. The patient has a fraility score of 3-MANAGING WELL, based on ability to perform ADLs by self.

## 2019-09-04 NOTE — PROGRESS NOTES
Report received from [address] [place] [address]. Procedural findings communicated. Intra procedural  medication administration reviewed. Progression of care discussed.      Patient received into [address] [facility] 8 post MALLORY    Routine post procedural vital signs and site assessment initiated yes    MALLORY unsuccessful

## 2019-09-04 NOTE — H&P
19     NAME:  Frnacisco Obrien III  : 1960  MRN: [id number]            SUBJECTIVE:   Francisco Chandler is a 63 y.o. male seen for a follow up visit regarding the following:           Chief Complaint   Patient presents with   [facility] Follow Up       patient went to ER with Afib         HPI:   History and physical reviewed, unchanged except where noted below:      Returned to ER with recurrent atrial fib/flutter of which he has history, now with recurrent severe MR awaiting surgery at [facility], has MALLORY with me today in anticipation thereof. Anticoagulated as of ER visit last week, rate controlled, poorly tolerant of BB previously, symptoms intermittently correlate with arrhythmia and complicated by severe anxiety. Had MAZE procedure but I am unable to detect in the operative note any indication of KIMO exclusion. He has felt better since converting back to SR. Is obviously anxious to get the surgery scheduled, which will be done after MALLORY completed and images sent to Dr Bailey Patel.          Past cardiac history:  2018 7 day monitor - NSR with episodes of atrial tachycardia converting to atypical atrial flutter, then to afib and back to sinus rhythm. Frequently reports fast heart beat and flutter which does correlate with arrhythmia but is also reported during normal sinus rhythm\"  18 (Dr Rodriguez) [address] [place] [address] with quadrant resection P2, primary closure and 32 mm Granado ring annuloplasty(MVR) MINIMALLY INVASIVE. CRYOABLATION OF RIGHT INTERCOSTAL SPACES MAZE PROCEDURE WITH CRYOABLATION. May 2019- EF 45-50%, MR is said to be severe-I personally reviewed images and agree, severe predominantly posteriorly directed holosystolic MR.   Aug 2019- Recurrent af/rvr - anticoagulated, rate controlled              Past Medical History, Past Surgical History, Family history, Social History, and Medications were all reviewed with the patient today and updated as necessary.              Prior to Admission medications    Medication Sig Start Date End Date Taking? Authorizing Provider   apixaban (ELIQUIS) 5 mg tablet Take 1 Tab by mouth two (2) times a day. 8/24/19   Yes Samuel Haynes NP   metoprolol succinate (TOPROL-XL) 25 mg XL tablet Take 1 Tab by mouth daily. 8/24/19   Yes Samuel Haynes NP   LORazepam (ATIVAN) 1 mg tablet Take 1 Tab by mouth two (2) times a day. Max Daily Amount: 2 mg. 7/17/19   Yes Ward Catalan MD   aspirin delayed-release 81 mg tablet TAKE 1 TABLET BY MOUTH DAILY 6/28/19   Yes Sheri Amezcua MD   zolpidem (AMBIEN) 10 mg tablet Take 1 Tab by mouth nightly. Max Daily Amount: 10 mg. 5/23/19   Yes Ward Catalan MD   sertraline (ZOLOFT) 100 mg tablet Take 2 Tabs by mouth daily.  3/20/19   Yes Dulcie Cogan, NP      No Known Allergies       Past Medical History:   Diagnosis Date    Allergic rhinitis due to other allergen 2/25/2015    Anticoagulated 4/3/2018    Anxiety state, unspecified 2/25/2015    CAD (coronary artery disease)       mild non obstructive , awaitng Maze for mitral valve repair/replacement    Depression       started with loss of parents 2010    First degree hemorrhoids 2/20/2017    Insomnia, unspecified 2/25/2015    Lipoma 2/25/2015    Mitral valve regurgitation      Paroxysmal A-fib [facility])            Unspecified adjustment reaction 2/25/2015            Past Surgical History:   Procedure Laterality Date    ECHO TRANSESOPHAGEAL (MALLORY) W OR WO CONTRAST   3/15/2018          HX HEART CATHETERIZATION   03/15/2018    HX HEENT   1996     wisdom teeth ext    HX ORTHOPAEDIC   late 1990's     cyst from left ring finger     HX WISDOM TEETH EXTRACTION                Family History   Problem Relation Age of Onset    Alcohol abuse Mother      Heart Failure Paternal Grandfather      Coronary Artery Disease Paternal Grandfather        Social History           Tobacco Use    Smoking status: Never Smoker    Smokeless tobacco: Never Used   Substance Use Topics    Alcohol use: No         ROS:     Review of Systems   Cardiovascular: Positive for palpitations. Negative for chest pain. Respiratory: Negative for shortness of breath.             PHYSICAL EXAM:     Visit Vitals  /76   Pulse 76   Ht 5' 8\" (1.727 m)   Wt 161 lb (73 kg)   BMI 24.48 kg/m²                Wt Readings from Last 3 Encounters:   08/27/19 161 lb (73 kg)   08/24/19 162 lb (73.5 kg)   07/17/19 162 lb (73.5 kg)          BP Readings from Last 3 Encounters:   08/27/19 110/76   08/24/19 108/65   07/17/19 130/76            Physical Exam   Constitutional: He is oriented to person, place, and time. He appears well-developed and well-nourished. HENT:   Head: Normocephalic and atraumatic. Eyes: Conjunctivae are normal.   Neck: Neck supple. No JVD present. Cardiovascular: Normal rate and regular rhythm. Exam reveals no gallop and no friction rub. No murmur heard. Pulmonary/Chest: Breath sounds normal. No respiratory distress. He has no wheezes. He has no rales. Musculoskeletal: He exhibits no edema. Neurological: He is alert and oriented to person, place, and time. Skin: Skin is warm and dry. Psychiatric: He has a normal mood and affect.    Vitals reviewed.        Medical problems and test results were reviewed with the patient today.               Lab Results   Component Value Date/Time     BUN 17 08/24/2019 05:44 PM            Lab Results   Component Value Date/Time     Creatinine 0.76 (L) 08/24/2019 05:44 PM            Lab Results   Component Value Date/Time     Potassium 3.5 08/24/2019 05:44 PM     POTASSIUM 4.61 04/19/2018 02:30 PM     Potassium, POC 4.7 04/19/2018 01:52 PM                  Lab Results   Component Value Date/Time     Cholesterol, total 171 03/20/2019 12:34 PM     HDL Cholesterol 51 03/20/2019 12:34 PM     LDL, calculated 99 03/20/2019 12:34 PM     VLDL, calculated 21 03/20/2019 12:34 PM     Triglyceride 105 03/20/2019 12:34 PM      EKGs reviewed from ER - initial atypical atrial flutter with variable conduction  Subsequent - converted to NSR, normal EKG     ASSESSMENT and PLAN     Diagnoses and all orders for this visit:     1. Non-rheumatic mitral regurgitation     2. Chronic anxiety     3. S/P MVR (mitral valve repair)     4. Atypical atrial flutter (HCC)                 IMPRESSION:      Recurrent atypical flutter and atrial fib. Lifelong anticoagulation, ?  Possible KIMO closure at surgery?       MALLORY today, answered questions, results to Dr Tori Paige upon completion, patient has been in touch with them.       FMLA extended to Oct 31 by which time we will have a surgical date.

## 2019-09-04 NOTE — PROGRESS NOTES
Patient up to bedside, vital signs and site stable. Patient ambulated to bathroom without difficulty. Patient voided without difficulty. Vascular site stable. 9770 Discharge instructions and home medications reviewed with patient. Time allowed for questions and answers. 0737 Patient ambulated second time without difficulty. Site stable after ambulation. Peripheral IV sites dc'd without difficulty with tips intact. 1512 Patient discharged to home with family.

## 2019-09-04 NOTE — PROCEDURES
Patient was brought to the lab in fasting state, consent and time out performed. Posterior pharynx anesthetized with topical xylocaine. Sedated with 150 mcg fentanyl and 4 mg IV versed. Multiple attempts with both the adult and pediatric probes were attempted but unsuccessful with severe laryngospasm. Patient's BP in the 98'B systolic prohibited further sedation. Procedure aborted and will be performed by his surgeon at the time of surgery under general anesthesia, message left with Dr Kayli Herzog and will contact his office. Well tolerated attempts, patient awake and appropriate post sedation, he and his wife informed of results and plan.      SEDATION TIME [id number]

## 2019-09-04 NOTE — DISCHARGE INSTRUCTIONS
AFTER YOU TRANSESOPHAGEAL ECHOCARDIOGRAM    Be sure someone else drives you home. You may feel drowsy for several hours. Do not eat or drink for at least two hours after your procedure (may eat/drink at 1025am). Your throat will be numb and there is a risk you might have difficulty swallowing for a while. Be careful when you do eat or drink for the first time especially with hot fluids since you could easily burn your throat. Call 911:    · You are bleeding from your throat or mouth. · You have trouble breathing all of a sudden. · You have chest pain or any pain that spreads to your neck, jaw, or arms. Call [facility] [address] Cardiology:  · You have questions or concerns. · You have a fever greater than 101°F.    Doctor: Lenora [address] Cardiology [phone]    Special Instructions:    No driving for 24 hours.

## 2019-10-16 ENCOUNTER — HOSPITAL ENCOUNTER (EMERGENCY)
Age: 59
Discharge: HOME | End: 2019-10-16
Attending: EMERGENCY MEDICINE
Payer: COMMERCIAL

## 2019-10-16 ENCOUNTER — APPOINTMENT (OUTPATIENT)
Dept: CT IMAGING | Age: 59
End: 2019-10-16
Attending: EMERGENCY MEDICINE
Payer: COMMERCIAL

## 2019-10-16 ENCOUNTER — APPOINTMENT (OUTPATIENT)
Dept: GENERAL RADIOLOGY | Age: 59
End: 2019-10-16
Attending: EMERGENCY MEDICINE
Payer: COMMERCIAL

## 2019-10-16 VITALS
WEIGHT: 162 LBS | TEMPERATURE: 98.9 F | HEIGHT: 68 IN | BODY MASS INDEX: 24.55 KG/M2 | DIASTOLIC BLOOD PRESSURE: 68 MMHG | HEART RATE: 70 BPM | OXYGEN SATURATION: 100 % | SYSTOLIC BLOOD PRESSURE: 107 MMHG | RESPIRATION RATE: 16 BRPM
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
## 2019-10-16 DIAGNOSIS — S20.212A HEMATOMA OF LEFT CHEST WALL, INITIAL ENCOUNTER: ICD-10-CM

## 2019-10-16 LAB
ALBUMIN SERPL-MCNC: 4.2 G/DL (ref 3.5–5)
ALBUMIN/GLOB SERPL: 1.2 {RATIO} (ref 1.2–3.5)
ALP SERPL-CCNC: 122 U/L (ref 50–136)
ALT SERPL-CCNC: 29 U/L (ref 12–65)
ANION GAP SERPL CALC-SCNC: 5 MMOL/L (ref 7–16)
AST SERPL-CCNC: 20 U/L (ref 15–37)
BASOPHILS # BLD: 0.1 K/UL (ref 0–0.2)
BASOPHILS NFR BLD: 1 % (ref 0–2)
BILIRUB SERPL-MCNC: 0.6 MG/DL (ref 0.2–1.1)
BUN SERPL-MCNC: 13 MG/DL (ref 6–23)
CALCIUM SERPL-MCNC: 9.1 MG/DL (ref 8.3–10.4)
CHLORIDE SERPL-SCNC: 108 MMOL/L (ref 98–107)
CO2 SERPL-SCNC: 29 MMOL/L (ref 21–32)
CREAT SERPL-MCNC: 0.86 MG/DL (ref 0.8–1.5)
DIFFERENTIAL METHOD BLD: ABNORMAL
EOSINOPHIL # BLD: 0.1 K/UL (ref 0–0.8)
EOSINOPHIL NFR BLD: 1 % (ref 0.5–7.8)
ERYTHROCYTE [DISTWIDTH] IN BLOOD BY AUTOMATED COUNT: 12.4 % (ref 11.9–14.6)
GLOBULIN SER CALC-MCNC: 3.4 G/DL (ref 2.3–3.5)
GLUCOSE SERPL-MCNC: 75 MG/DL (ref 65–100)
HCT VFR BLD AUTO: 48.3 % (ref 41.1–50.3)
HGB BLD-MCNC: 16.5 G/DL (ref 13.6–17.2)
IMM GRANULOCYTES # BLD AUTO: 0 K/UL (ref 0–0.5)
IMM GRANULOCYTES NFR BLD AUTO: 0 % (ref 0–5)
LYMPHOCYTES # BLD: 1.6 K/UL (ref 0.5–4.6)
LYMPHOCYTES NFR BLD: 20 % (ref 13–44)
MCH RBC QN AUTO: 30.8 PG (ref 26.1–32.9)
MCHC RBC AUTO-ENTMCNC: 34.2 G/DL (ref 31.4–35)
MCV RBC AUTO: 90.3 FL (ref 79.6–97.8)
MONOCYTES # BLD: 0.4 K/UL (ref 0.1–1.3)
MONOCYTES NFR BLD: 5 % (ref 4–12)
NEUTS SEG # BLD: 6.2 K/UL (ref 1.7–8.2)
NEUTS SEG NFR BLD: 74 % (ref 43–78)
NRBC # BLD: 0 K/UL (ref 0–0.2)
PLATELET # BLD AUTO: 259 K/UL (ref 150–450)
PMV BLD AUTO: 9.3 FL (ref 9.4–12.3)
POTASSIUM SERPL-SCNC: 3.9 MMOL/L (ref 3.5–5.1)
PROT SERPL-MCNC: 7.6 G/DL (ref 6.3–8.2)
RBC # BLD AUTO: 5.35 M/UL (ref 4.23–5.6)
SODIUM SERPL-SCNC: 142 MMOL/L (ref 136–145)
TROPONIN I SERPL-MCNC: <0.02 NG/ML (ref 0.02–0.05)
WBC # BLD AUTO: 8.4 K/UL (ref 4.3–11.1)

## 2019-10-16 PROCEDURE — 73030 X-RAY EXAM OF SHOULDER: CPT

## 2019-10-16 PROCEDURE — 85025 COMPLETE CBC W/AUTO DIFF WBC: CPT

## 2019-10-16 PROCEDURE — 84484 ASSAY OF TROPONIN QUANT: CPT

## 2019-10-16 PROCEDURE — 80053 COMPREHEN METABOLIC PANEL: CPT

## 2019-10-16 PROCEDURE — 74011250637 HC RX REV CODE- 250/637: Performed by: EMERGENCY MEDICINE

## 2019-10-16 PROCEDURE — 70450 CT HEAD/BRAIN W/O DYE: CPT

## 2019-10-16 PROCEDURE — 70498 CT ANGIOGRAPHY NECK: CPT

## 2019-10-16 PROCEDURE — 74011636320 HC RX REV CODE- 636/320: Performed by: EMERGENCY MEDICINE

## 2019-10-16 PROCEDURE — 93005 ELECTROCARDIOGRAM TRACING: CPT | Performed by: EMERGENCY MEDICINE

## 2019-10-16 PROCEDURE — 99284 EMERGENCY DEPT VISIT MOD MDM: CPT | Performed by: EMERGENCY MEDICINE

## 2019-10-16 PROCEDURE — 71275 CT ANGIOGRAPHY CHEST: CPT

## 2019-10-16 PROCEDURE — 74011000258 HC RX REV CODE- 258: Performed by: EMERGENCY MEDICINE

## 2019-10-16 PROCEDURE — 71046 X-RAY EXAM CHEST 2 VIEWS: CPT

## 2019-10-16 RX ORDER — HYDROCODONE BITARTRATE AND ACETAMINOPHEN 5; 325 MG/1; MG/1
1 TABLET ORAL ONCE
Status: COMPLETED | OUTPATIENT
Start: 2019-10-16 | End: 2019-10-16

## 2019-10-16 RX ORDER — SODIUM CHLORIDE 0.9 % (FLUSH) 0.9 %
10 SYRINGE (ML) INJECTION
Status: COMPLETED | OUTPATIENT
Start: 2019-10-16 | End: 2019-10-16

## 2019-10-16 RX ORDER — HYDROCODONE BITARTRATE AND ACETAMINOPHEN 7.5; 325 MG/1; MG/1
1 TABLET ORAL
Qty: 12 TAB | Refills: 0 | Status: SHIPPED | OUTPATIENT
Start: 2019-10-16 | End: 2019-10-19

## 2019-10-16 RX ADMIN — Medication 10 ML: at 20:12

## 2019-10-16 RX ADMIN — HYDROCODONE BITARTRATE AND ACETAMINOPHEN 1 TABLET: 5; 325 TABLET ORAL at 19:32

## 2019-10-16 RX ADMIN — IOPAMIDOL 75 ML: 755 INJECTION, SOLUTION INTRAVENOUS at 20:10

## 2019-10-16 RX ADMIN — SODIUM CHLORIDE 100 ML: 900 INJECTION, SOLUTION INTRAVENOUS at 20:12
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
## 2019-10-17 LAB
ATRIAL RATE: 74 BPM
CALCULATED P AXIS, ECG09: 58 DEGREES
CALCULATED R AXIS, ECG10: -25 DEGREES
CALCULATED T AXIS, ECG11: 24 DEGREES
DIAGNOSIS, 93000: NORMAL
P-R INTERVAL, ECG05: 186 MS
Q-T INTERVAL, ECG07: 394 MS
QRS DURATION, ECG06: 96 MS
QTC CALCULATION (BEZET), ECG08: 437 MS
VENTRICULAR RATE, ECG03: 74 BPM

## 2019-10-17 NOTE — ED NOTES
Stable other than generalized soreness on reevaluation prior to discharge and review of CTA studies.

## 2020-01-31 NOTE — PROGRESS NOTES
Subjective:   No complaint    Objective:     Patient Vitals for the past 8 hrs:   BP Temp Pulse Resp SpO2 Weight   18 0720 112/61 98.9 °F (37.2 °C) 62 18 97 % -   18 0428 - - - - - 171 lb 1.6 oz (77.6 kg)   18 0419 112/57 97.8 °F (36.6 °C) (!) 59 16 94 % -     Temp (24hrs), Av.4 °F (36.9 °C), Min:97.8 °F (36.6 °C), Max:99.1 °F (37.3 °C)        Heart:  regular rate and rhythm, S1, S2 normal, no murmur, click, rub or gallop  Lung:  clear to auscultation bilaterally   Incisions: Clean, dry, and intact    Labs:  Recent Results (from the past 24 hour(s))   PLEASE READ & DOCUMENT PPD TEST IN 48 HRS    Collection Time: 18  8:47 PM   Result Value Ref Range    PPD neg Negative    mm Induration 0 mm   MAGNESIUM    Collection Time: 18  4:17 AM   Result Value Ref Range    Magnesium 2.2 1.8 - 2.4 mg/dL   POTASSIUM    Collection Time: 18  4:17 AM   Result Value Ref Range    Potassium 4.0 3.5 - 5.1 mmol/L       Assessment:     Principal Problem:    Mitral valve regurgitation ()      Overview: 18 (Dr Darlene Urbano) MITRAL VALVE REPAIR with quadrant resection P2,       primary closure and 32 mm Granado ring annuloplasty(MVR) MINIMALLY       INVASIVE.  CRYOABLATION OF RIGHT INTERCOSTAL SPACES      MAZE PROCEDURE WITH CRYOABLATION    Active Problems:    Respiratory failure, post-operative ([address] (2018)      Hypoxia (2018)      Anxiety (2018)      Mitral valve prolapse (2018)      S/P MVR (mitral valve repair) (2018)      Atelectasis (2018)        Plan/Recommendations/Medical Decision Making:     Having some first and second degree block, consult cards, otherwise doing great    See orders Prednisone Counseling:  I discussed with the patient the risks of prolonged use of prednisone including but not limited to weight gain, insomnia, osteoporosis, mood changes, diabetes, susceptibility to infection, glaucoma and high blood pressure.  In cases where prednisone use is prolonged, patients should be monitored with blood pressure checks, serum glucose levels and an eye exam.  Additionally, the patient may need to be placed on GI prophylaxis, PCP prophylaxis, and calcium and vitamin D supplementation and/or a bisphosphonate.  The patient verbalized understanding of the proper use and the possible adverse effects of prednisone.  All of the patient's questions and concerns were addressed.

## 2020-02-11 ENCOUNTER — HOSPITAL ENCOUNTER (OUTPATIENT)
Dept: CARDIAC REHAB | Age: 60
Discharge: HOME | End: 2020-02-11

## 2020-02-11 NOTE — CARDIO/PULMONARY
Dear Dr. Rhett Carrillo: Your patient, Cecelia Jane \"Chip\" Josephine Weiner (: 1960), has taken the PHQ-9 as part of his admission to the Cardiac Rehab program. The results of this test indicate that he may be experiencing stress and/or depression. In our discussion, when asked if he was experiencing anxiety, depression, panic attacks or poor coping with daily life, he said depression and anxiety related mostly to his heart valve issues. He indicated a desire to talk to you or someone at [facility] about his depression and anxiety. Our medication list shows that he is taking lorazepam 1 mg and Zoloft 100 mg as well as Zolpidem 10 mg for insomnia. It is our program protocol to contact the patient's PCP if the PHQ-9 score is greater than 9; his score was 15. He said that he has no thoughts of harming himself. We want you to be aware of his score and our discussion today. Thank you.     SAEED GonzalezN, RN  Cardiopulmonary Rehabilitation

## 2020-02-11 NOTE — CARDIO/PULMONARY
Dear Dr. Yancy Coreas: Thank you for referring your patient, Joe Stiles \"Chip\" Adelina Childress (: 1960), to the Cardiopulmonary Rehabilitation Program at [facility].  Mr. Adelina Childress is a good candidate for the Cardiac Rehab Program and should see improvements with regular participation. We will be addressing appropriate interventions for modifiable risk factors with your patient during the next 12 weeks. We will contact you with any issues or concerns that may arise, or you can follow your patients progress through [address] [place] [address] at any time. A final summary will be available on [facility] when the program is completed. Again, thank you for your referral. If we can be of further assistance, please feel free to contact the Cardiopulmonary Rehab staff at [phone].     Sincerely,    SVETLANA Bansal, RN  Cardiopulmonary Rehabilitation Nurse  [facility]

## 2020-02-14 ENCOUNTER — HOSPITAL ENCOUNTER (OUTPATIENT)
Dept: CARDIAC REHAB | Age: 60
Discharge: HOME | End: 2020-02-14
Payer: COMMERCIAL

## 2020-02-14 VITALS — HEIGHT: 69 IN | BODY MASS INDEX: 23.91 KG/M2 | WEIGHT: 161.4 LBS

## 2020-02-14 PROCEDURE — 93798 PHYS/QHP OP CAR RHAB W/ECG: CPT

## 2020-02-17 ENCOUNTER — HOSPITAL ENCOUNTER (OUTPATIENT)
Dept: CARDIAC REHAB | Age: 60
Discharge: HOME | End: 2020-02-17
Payer: COMMERCIAL

## 2020-02-17 PROCEDURE — 93798 PHYS/QHP OP CAR RHAB W/ECG: CPT

## 2020-02-19 ENCOUNTER — HOSPITAL ENCOUNTER (OUTPATIENT)
Dept: CARDIAC REHAB | Age: 60
Discharge: HOME | End: 2020-02-19
Payer: COMMERCIAL

## 2020-02-19 VITALS — BODY MASS INDEX: 24.07 KG/M2 | WEIGHT: 163 LBS

## 2020-02-19 PROCEDURE — 93798 PHYS/QHP OP CAR RHAB W/ECG: CPT

## 2020-02-21 ENCOUNTER — APPOINTMENT (OUTPATIENT)
Dept: CARDIAC REHAB | Age: 60
End: 2020-02-21
Payer: COMMERCIAL

## 2020-02-24 ENCOUNTER — HOSPITAL ENCOUNTER (OUTPATIENT)
Dept: CARDIAC REHAB | Age: 60
Discharge: HOME | End: 2020-02-24
Payer: COMMERCIAL

## 2020-02-24 PROCEDURE — 93798 PHYS/QHP OP CAR RHAB W/ECG: CPT

## 2020-02-26 ENCOUNTER — HOSPITAL ENCOUNTER (OUTPATIENT)
Dept: CARDIAC REHAB | Age: 60
Discharge: HOME | End: 2020-02-26
Payer: COMMERCIAL

## 2020-02-26 VITALS — WEIGHT: 161.8 LBS | BODY MASS INDEX: 23.89 KG/M2

## 2020-02-26 PROCEDURE — 93798 PHYS/QHP OP CAR RHAB W/ECG: CPT
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
## 2020-02-28 ENCOUNTER — HOSPITAL ENCOUNTER (OUTPATIENT)
Dept: CARDIAC REHAB | Age: 60
Discharge: HOME | End: 2020-02-28
Payer: COMMERCIAL

## 2020-02-28 PROCEDURE — 93798 PHYS/QHP OP CAR RHAB W/ECG: CPT

## 2020-03-02 ENCOUNTER — HOSPITAL ENCOUNTER (OUTPATIENT)
Dept: CARDIAC REHAB | Age: 60
Discharge: HOME | End: 2020-03-02
Payer: COMMERCIAL

## 2020-03-02 PROCEDURE — 93798 PHYS/QHP OP CAR RHAB W/ECG: CPT

## 2020-03-04 ENCOUNTER — HOSPITAL ENCOUNTER (OUTPATIENT)
Dept: CARDIAC REHAB | Age: 60
Discharge: HOME | End: 2020-03-04
Payer: COMMERCIAL

## 2020-03-04 VITALS — BODY MASS INDEX: 24.01 KG/M2 | WEIGHT: 162.6 LBS

## 2020-03-04 PROCEDURE — 93798 PHYS/QHP OP CAR RHAB W/ECG: CPT

## 2020-03-06 ENCOUNTER — APPOINTMENT (OUTPATIENT)
Dept: CARDIAC REHAB | Age: 60
End: 2020-03-06
Payer: COMMERCIAL

## 2020-03-09 ENCOUNTER — HOSPITAL ENCOUNTER (OUTPATIENT)
Dept: CARDIAC REHAB | Age: 60
Discharge: HOME | End: 2020-03-09
Payer: COMMERCIAL

## 2020-03-09 PROCEDURE — 93798 PHYS/QHP OP CAR RHAB W/ECG: CPT

## 2020-03-11 ENCOUNTER — HOSPITAL ENCOUNTER (OUTPATIENT)
Dept: CARDIAC REHAB | Age: 60
Discharge: HOME | End: 2020-03-11
Payer: COMMERCIAL

## 2020-03-11 VITALS — BODY MASS INDEX: 24.01 KG/M2 | WEIGHT: 162.6 LBS

## 2020-03-11 PROCEDURE — 93798 PHYS/QHP OP CAR RHAB W/ECG: CPT

## 2020-04-15 ENCOUNTER — APPOINTMENT (OUTPATIENT)
Dept: CARDIAC REHAB | Age: 60
End: 2020-04-15

## 2020-04-17 ENCOUNTER — APPOINTMENT (OUTPATIENT)
Dept: CARDIAC REHAB | Age: 60
End: 2020-04-17

## 2020-04-20 ENCOUNTER — APPOINTMENT (OUTPATIENT)
Dept: CARDIAC REHAB | Age: 60
End: 2020-04-20

## 2020-04-22 ENCOUNTER — APPOINTMENT (OUTPATIENT)
Dept: CARDIAC REHAB | Age: 60
End: 2020-04-22

## 2020-04-24 ENCOUNTER — APPOINTMENT (OUTPATIENT)
Dept: CARDIAC REHAB | Age: 60
End: 2020-04-24

## 2020-04-27 ENCOUNTER — APPOINTMENT (OUTPATIENT)
Dept: CARDIAC REHAB | Age: 60
End: 2020-04-27

## 2020-04-29 ENCOUNTER — APPOINTMENT (OUTPATIENT)
Dept: CARDIAC REHAB | Age: 60
End: 2020-04-29

## 2020-05-05 PROBLEM — I42.8 NONISCHEMIC CARDIOMYOPATHY (HCC): Status: ACTIVE | Noted: 2020-05-05

## 2020-06-02 ENCOUNTER — TELEPHONE (OUTPATIENT)
Dept: CARDIAC REHAB | Age: 60
End: 2020-06-02

## 2020-06-02 NOTE — TELEPHONE ENCOUNTER
Called to check on pt and see if he plans to return to cardiac rehab.  Asked him to call us back, and if no response by Thursday 6/4/2020, we will discharge from our program.

## 2022-03-18 PROBLEM — Z79.01 ANTICOAGULATED: Status: ACTIVE | Noted: 2018-04-03

## 2022-03-18 PROBLEM — F41.9 ANXIETY: Status: ACTIVE | Noted: 2018-04-19

## 2022-03-18 PROBLEM — I34.1 MITRAL VALVE PROLAPSE: Status: ACTIVE | Noted: 2018-04-19

## 2022-03-18 PROBLEM — I42.8 NONISCHEMIC CARDIOMYOPATHY (HCC): Status: ACTIVE | Noted: 2020-05-05

## 2022-03-18 PROBLEM — I44.0 FIRST DEGREE AV BLOCK: Status: ACTIVE | Noted: 2018-04-22

## 2022-03-18 PROBLEM — I48.91 ATRIAL FIBRILLATION WITH RVR (HCC): Status: ACTIVE | Noted: 2019-08-24

## 2022-03-19 PROBLEM — F32.A MILD DEPRESSION: Status: ACTIVE | Noted: 2018-02-26

## 2022-03-19 PROBLEM — I34.0 NON-RHEUMATIC MITRAL REGURGITATION: Status: ACTIVE | Noted: 2019-06-17

## 2022-03-19 PROBLEM — I44.1 MOBITZ (TYPE) I (WENCKEBACH'S) ATRIOVENTRICULAR BLOCK: Status: ACTIVE | Noted: 2018-04-22

## 2022-03-19 PROBLEM — Z98.890 S/P MVR (MITRAL VALVE REPAIR): Status: ACTIVE | Noted: 2018-04-19

## 2022-03-19 PROBLEM — I48.0 PAROXYSMAL ATRIAL FIBRILLATION (HCC): Status: ACTIVE | Noted: 2018-03-12

## 2022-06-13 ENCOUNTER — TELEMEDICINE (OUTPATIENT)
Dept: INTERNAL MEDICINE CLINIC | Facility: CLINIC | Age: 62
End: 2022-06-13
Payer: MEDICARE

## 2022-06-13 DIAGNOSIS — U07.1 COVID-19: Primary | ICD-10-CM

## 2022-06-13 DIAGNOSIS — R21 RASH: ICD-10-CM

## 2022-06-13 DIAGNOSIS — R05.9 COUGH: ICD-10-CM

## 2022-06-13 PROBLEM — I48.0 PAROXYSMAL ATRIAL FIBRILLATION (HCC): Status: ACTIVE | Noted: 2018-03-12

## 2022-06-13 PROCEDURE — 99442 PR PHYS/QHP TELEPHONE EVALUATION 11-20 MIN: CPT | Performed by: NURSE PRACTITIONER

## 2022-06-13 RX ORDER — AZITHROMYCIN 250 MG/1
250 TABLET, FILM COATED ORAL SEE ADMIN INSTRUCTIONS
Qty: 6 TABLET | Refills: 0 | Status: SHIPPED | OUTPATIENT
Start: 2022-06-13 | End: 2022-06-18

## 2022-06-13 RX ORDER — PREDNISONE 20 MG/1
TABLET ORAL
Qty: 13 TABLET | Refills: 0 | Status: SHIPPED | OUTPATIENT
Start: 2022-06-13 | End: 2022-06-21

## 2022-06-13 ASSESSMENT — PATIENT HEALTH QUESTIONNAIRE - PHQ9
2. FEELING DOWN, DEPRESSED OR HOPELESS: 0
SUM OF ALL RESPONSES TO PHQ9 QUESTIONS 1 & 2: 0
SUM OF ALL RESPONSES TO PHQ QUESTIONS 1-9: 0
1. LITTLE INTEREST OR PLEASURE IN DOING THINGS: 0
SUM OF ALL RESPONSES TO PHQ QUESTIONS 1-9: 0

## 2022-06-13 NOTE — PATIENT INSTRUCTIONS
Patient Education        Rash: Care Instructions  Your Care Instructions  A rash is any irritation or inflammation of the skin. Rashes have many possiblecauses, including allergy, infection, illness, heat, and emotional stress. Follow-up care is a key part of your treatment and safety. Be sure to make and go to all appointments, and call your doctor if you are having problems. It's also a good idea to know your test results and keep alist of the medicines you take. How can you care for yourself at home?  Wash the area with water only. Soap can make dryness and itching worse. Pat dry.  Put cold, wet cloths on the rash to reduce itching.  Keep cool, and stay out of the sun.  Leave the rash open to the air as much of the time as possible.  Sometimes petroleum jelly (Vaseline) can help relieve the discomfort caused by a rash. A moisturizing lotion, such as Cetaphil, also may help. Calamine lotion may help for rashes caused by contact with something (such as a plant or soap) that irritated the skin. Use it 3 or 4 times a day.  If your doctor prescribed a cream, use it as directed. If your doctor prescribed medicine, take it exactly as directed.  If itching affects your sleep, ask your doctor if you can take an antihistamine that might reduce itching and make you sleepy, such as diphenhydramine (Benadryl). Be safe with medicines. Read and follow all instructions on the label. When should you call for help? Call your doctor now or seek immediate medical care if:     You have signs of infection, such as:  ? Increased pain, swelling, warmth, or redness. ? Red streaks leading from the area. ? Pus draining from the area. ? A fever.      You have joint pain along with the rash. Watch closely for changes in your health, and be sure to contact your doctor if:     Your rash is changing or getting worse.  For example, call if you have pain along with the rash, the rash is spreading, or you have new blisters.      You do not get better after 1 week. Where can you learn more? Go to https://chpepiceweb.[employer]. org and sign in to your [employer] account. Enter W083 in the Cryptonator box to learn more about \"Rash: Care Instructions. \"     If you do not have an account, please click on the \"Sign Up Now\" link. Current as of: November 15, 2021               Content Version: 13.2  © 2006-2022 Healthwise, Incorporated. Care instructions adapted under license by [facility] ([facility]). If you have questions about a medical condition or this instruction, always ask your healthcare professional. [address] any warranty or liability for your use of this information.

## 2022-07-01 ENCOUNTER — OFFICE VISIT (OUTPATIENT)
Dept: INTERNAL MEDICINE CLINIC | Facility: CLINIC | Age: 62
End: 2022-07-01
Payer: MEDICARE

## 2022-07-01 VITALS
SYSTOLIC BLOOD PRESSURE: 122 MMHG | TEMPERATURE: 98 F | WEIGHT: 157 LBS | BODY MASS INDEX: 23.25 KG/M2 | DIASTOLIC BLOOD PRESSURE: 72 MMHG | HEIGHT: 69 IN

## 2022-07-01 DIAGNOSIS — I42.8 NONISCHEMIC CARDIOMYOPATHY (HCC): ICD-10-CM

## 2022-07-01 DIAGNOSIS — U07.1 RASH ASSOCIATED WITH COVID-19: ICD-10-CM

## 2022-07-01 DIAGNOSIS — R35.1 NOCTURIA: ICD-10-CM

## 2022-07-01 DIAGNOSIS — I42.8 NONISCHEMIC CARDIOMYOPATHY (HCC): Primary | ICD-10-CM

## 2022-07-01 DIAGNOSIS — G47.01 INSOMNIA DUE TO MEDICAL CONDITION: ICD-10-CM

## 2022-07-01 DIAGNOSIS — U07.1 COVID-19: ICD-10-CM

## 2022-07-01 DIAGNOSIS — R21 RASH ASSOCIATED WITH COVID-19: ICD-10-CM

## 2022-07-01 DIAGNOSIS — F41.9 ANXIETY: ICD-10-CM

## 2022-07-01 DIAGNOSIS — F32.0 MAJOR DEPRESSIVE DISORDER, SINGLE EPISODE, MILD (HCC): ICD-10-CM

## 2022-07-01 DIAGNOSIS — M70.62 TROCHANTERIC BURSITIS OF LEFT HIP: ICD-10-CM

## 2022-07-01 LAB
ALBUMIN SERPL-MCNC: 3.9 G/DL (ref 3.2–4.6)
ALBUMIN/GLOB SERPL: 1.3 {RATIO} (ref 1.2–3.5)
ALP SERPL-CCNC: 80 U/L (ref 50–136)
ALT SERPL-CCNC: 71 U/L (ref 12–65)
ANION GAP SERPL CALC-SCNC: 8 MMOL/L (ref 7–16)
AST SERPL-CCNC: 62 U/L (ref 15–37)
BASOPHILS # BLD: 0.1 K/UL (ref 0–0.2)
BASOPHILS NFR BLD: 1 % (ref 0–2)
BILIRUB SERPL-MCNC: 0.4 MG/DL (ref 0.2–1.1)
BUN SERPL-MCNC: 15 MG/DL (ref 8–23)
CALCIUM SERPL-MCNC: 9 MG/DL (ref 8.3–10.4)
CHLORIDE SERPL-SCNC: 112 MMOL/L (ref 98–107)
CO2 SERPL-SCNC: 21 MMOL/L (ref 21–32)
CREAT SERPL-MCNC: 0.6 MG/DL (ref 0.8–1.5)
DIFFERENTIAL METHOD BLD: NORMAL
EOSINOPHIL # BLD: 0.1 K/UL (ref 0–0.8)
EOSINOPHIL NFR BLD: 1 % (ref 0.5–7.8)
ERYTHROCYTE [DISTWIDTH] IN BLOOD BY AUTOMATED COUNT: 13.3 % (ref 11.9–14.6)
GLOBULIN SER CALC-MCNC: 3 G/DL (ref 2.3–3.5)
GLUCOSE SERPL-MCNC: 92 MG/DL (ref 65–100)
HCT VFR BLD AUTO: 45.4 % (ref 41.1–50.3)
HGB BLD-MCNC: 15.1 G/DL (ref 13.6–17.2)
IMM GRANULOCYTES # BLD AUTO: 0 K/UL (ref 0–0.5)
IMM GRANULOCYTES NFR BLD AUTO: 0 % (ref 0–5)
LYMPHOCYTES # BLD: 1 K/UL (ref 0.5–4.6)
LYMPHOCYTES NFR BLD: 18 % (ref 13–44)
MCH RBC QN AUTO: 30 PG (ref 26.1–32.9)
MCHC RBC AUTO-ENTMCNC: 33.3 G/DL (ref 31.4–35)
MCV RBC AUTO: 90.3 FL (ref 79.6–97.8)
MONOCYTES # BLD: 0.4 K/UL (ref 0.1–1.3)
MONOCYTES NFR BLD: 7 % (ref 4–12)
NEUTS SEG # BLD: 4 K/UL (ref 1.7–8.2)
NEUTS SEG NFR BLD: 73 % (ref 43–78)
NRBC # BLD: 0 K/UL (ref 0–0.2)
PLATELET # BLD AUTO: 228 K/UL (ref 150–450)
PMV BLD AUTO: 9.6 FL (ref 9.4–12.3)
POTASSIUM SERPL-SCNC: 4.3 MMOL/L (ref 3.5–5.1)
PROT SERPL-MCNC: 6.9 G/DL (ref 6.3–8.2)
RBC # BLD AUTO: 5.03 M/UL (ref 4.23–5.6)
SODIUM SERPL-SCNC: 141 MMOL/L (ref 136–145)
WBC # BLD AUTO: 5.5 K/UL (ref 4.3–11.1)

## 2022-07-01 PROCEDURE — 1036F TOBACCO NON-USER: CPT | Performed by: INTERNAL MEDICINE

## 2022-07-01 PROCEDURE — 99214 OFFICE O/P EST MOD 30 MIN: CPT | Performed by: INTERNAL MEDICINE

## 2022-07-01 PROCEDURE — G8420 CALC BMI NORM PARAMETERS: HCPCS | Performed by: INTERNAL MEDICINE

## 2022-07-01 PROCEDURE — 3017F COLORECTAL CA SCREEN DOC REV: CPT | Performed by: INTERNAL MEDICINE

## 2022-07-01 PROCEDURE — G8427 DOCREV CUR MEDS BY ELIG CLIN: HCPCS | Performed by: INTERNAL MEDICINE

## 2022-07-01 RX ORDER — PREDNISONE 10 MG/1
TABLET ORAL
Qty: 10 TABLET | Refills: 0 | Status: SHIPPED | OUTPATIENT
Start: 2022-07-01 | End: 2022-07-05 | Stop reason: DRUGHIGH

## 2022-07-01 ASSESSMENT — PATIENT HEALTH QUESTIONNAIRE - PHQ9
2. FEELING DOWN, DEPRESSED OR HOPELESS: 0
SUM OF ALL RESPONSES TO PHQ QUESTIONS 1-9: 0
SUM OF ALL RESPONSES TO PHQ9 QUESTIONS 1 & 2: 0
1. LITTLE INTEREST OR PLEASURE IN DOING THINGS: 0
SUM OF ALL RESPONSES TO PHQ QUESTIONS 1-9: 0

## 2022-07-01 ASSESSMENT — ENCOUNTER SYMPTOMS: SHORTNESS OF BREATH: 0

## 2022-07-01 NOTE — PATIENT INSTRUCTIONS
Patient Education        Hip Bursitis: Exercises  Introduction  Here are some examples of exercises for you to try. The exercises may be suggested for a condition or for rehabilitation. Start each exercise slowly. Ease off the exercises if you start to have pain. You will be told when to start these exercises and which ones will work bestfor you. How to do the exercises  Hip rotator stretch    1. Lie on your back with both knees bent and your feet flat on the floor. 2. Put the ankle of your affected leg on your opposite thigh near your knee. 3. Use your hand to gently push your knee away from your body until you feel a gentle stretch around your hip. 4. Hold the stretch for 15 to 30 seconds. 5. Repeat 2 to 4 times. 6. Repeat steps 1 through 5, but this time use your hand to gently pull your knee toward your opposite shoulder. Iliotibial band stretch    1. Lean sideways against a wall. If you are not steady on your feet, hold on to a chair or counter. 2. Stand on the leg with the affected hip, with that leg close to the wall. Then cross your other leg in front of it. 3. Let your affected hip drop out to the side of your body and against wall. Then lean away from your affected hip until you feel a stretch. 4. Hold the stretch for 15 to 30 seconds. 5. Repeat 2 to 4 times. Straight-leg raises to the outside    1. Lie on your side, with your affected hip on top. 2. Tighten the front thigh muscles of your top leg to keep your knee straight. 3. Keep your hip and your leg straight in line with the rest of your body, and keep your knee pointing forward. Do not drop your hip back. 4. Lift your top leg straight up toward the ceiling, about 12 inches off the floor. Hold for about 6 seconds, then slowly lower your leg. 5. Repeat 8 to 12 times. Clamshell    1. Lie on your side, with your affected hip on top and your head propped on a pillow. Keep your feet and knees together and your knees bent.   2. Raise your top knee, but keep your feet together. Do not let your hips roll back. Your legs should open up like a clamshell. 3. Hold for 6 seconds. 4. Slowly lower your knee back down. Rest for 10 seconds. 5. Repeat 8 to 12 times. Follow-up care is a key part of your treatment and safety. Be sure to make and go to all appointments, and call your doctor if you are having problems. It's also a good idea to know your test results and keep alist of the medicines you take. Where can you learn more? Go to https://UniversityLyfepe"[email or web address] org and sign in to your [employer] account. Enter C135 in the Vizify box to learn more about \"Hip Bursitis: Exercises. \"     If you do not have an account, please click on the \"Sign Up Now\" link. Current as of: March 9, 2022               Content Version: 13.3  © 8074-9304 Healthwise, Incorporated. Care instructions adapted under license by [facility] ([facility]). If you have questions about a medical condition or this instruction, always ask your healthcare professional. [place] [address] any warranty or liability for your use of this information.

## 2022-07-01 NOTE — PROGRESS NOTES
HPI: Shweta Bruno III (: 1960)    Had Covid and developed a rash with it and did take Prednisone but would like another round as it is still present    Need to update labs     Also has been sleeping in a chair and has pain in his left hip area that is tender  Has been a runner and exercising for yrs but this is new    Problem List:  Patient Active Problem List   Diagnosis    Mitral valve prolapse    Anxiety    Atrial fibrillation with RVR ([address]    Anticoagulated    First degree AV block    Nonischemic cardiomyopathy ([address]    Insomnia    Non-rheumatic mitral regurgitation    Mobitz (type) I (Wenckebach's) atrioventricular block    Major depressive disorder, single episode, mild (HCC)    Paroxysmal atrial fibrillation ([address]    S/P MVR (mitral valve repair)    Mitral valve regurgitation    Trochanteric bursitis of left hip       History:  Past Medical History:   Diagnosis Date    Allergic rhinitis due to other allergen 2015    Anticoagulated 4/3/2018    Anxiety state, unspecified 2015    CAD (coronary artery disease)     mild non obstructive , awaitng Maze for mitral valve repair/replacement    Depression     started with loss of parents     First degree hemorrhoids 2017    Insomnia, unspecified 2015    Lipoma 2015    Mitral valve regurgitation     Paroxysmal A-fib ([address]          Unspecified adjustment reaction 2015       Allergies:  No Known Allergies    Current Medications:  Current Outpatient Medications   Medication Sig Dispense Refill    predniSONE (DELTASONE) 10 MG tablet 3 po day 1, 2 po day 2 and 3 , 1 po day 4 and 5 and 1/2 po day 6 and 7 10 tablet 0    ASPIRIN 81 PO TAKE 1 TABLET BY MOUTH EVERY DAY      apixaban (ELIQUIS) 5 MG TABS tablet TAKE 1 TABLET TWICE A DAY FOR TREATMENT TO PREVENT BLOOD CLOTS IN CHRONIC ATRIAL FIBRILLATION      aspirin 81 MG EC tablet TAKE 1 TABLET BY MOUTH DAILY      Cetirizine HCl (ZYRTEC ALLERGY) 10 MG CAPS Take by mouth      LORazepam (ATIVAN) 1 MG tablet Take 1 mg by mouth 2 times daily.  metoprolol succinate (TOPROL XL) 25 MG extended release tablet Take 25 mg by mouth daily      sertraline (ZOLOFT) 100 MG tablet Take by mouth 2 times daily      zolpidem (AMBIEN) 10 MG tablet TAKE 1/2-1 TABLET BY MOUTH AT BEDTIME AS NEEDED FOR SLEEP       No current facility-administered medications for this visit. Review of Systems:  Review of Systems   Constitutional:        Weight loss   Respiratory: Negative for shortness of breath. Cardiovascular: Negative for chest pain. Skin: Positive for rash. Psychiatric/Behavioral: Positive for sleep disturbance. The patient is nervous/anxious. Vitals:  /72   Temp 98 °F (36.7 °C)   Ht 5' 9\" (1.753 m)   Wt 157 lb (71.2 kg)   BMI 23.18 kg/m²     Physical Exam:  Physical Exam  Vitals reviewed. Constitutional:       Appearance: Normal appearance. HENT:      Head: Normocephalic and atraumatic. Cardiovascular:      Rate and Rhythm: Normal rate and regular rhythm. Heart sounds: Normal heart sounds. Pulmonary:      Effort: Pulmonary effort is normal.      Breath sounds: Normal breath sounds. Musculoskeletal:         General: Normal range of motion. Cervical back: Normal range of motion and neck supple. Skin:     General: Skin is warm and dry. Findings: Rash present. Comments: Raised red bumps and some linear red streaks on chest and back   Neurological:      General: No focal deficit present. Mental Status: He is alert and oriented to person, place, and time. Psychiatric:         Mood and Affect: Mood normal.         Behavior: Behavior normal.         Thought Content: Thought content normal.         Judgment: Judgment normal.          Assessment/Plan:   Augustus Pereira was seen today for follow-up.     Diagnoses and all orders for this visit:    Nonischemic cardiomyopathy ([address]  -     CBC with Auto Differential; Future  - Comprehensive Metabolic Panel; Future  UTD on eval with Cardiology and had lipid profile in Jan  Recheck labs and BP controlled with Toprol XL  Major depressive disorder, single episode, mild (HCC)  Continue Zoloft  Insomnia due to medical condition  Still taking Ambien prn and sleep has been worse since Covid  Anxiety  Continue Ativan and weaning dose  COVID-19  Is feeling better but rash still present  Nocturia  -     PSA, Total and Free; Future    Rash associated with COVID-19  Try another steroid taper  Trochanteric bursitis of left hip  Steroid taper will help and given exercises to do at home to help with strength    Other orders  -     predniSONE (DELTASONE) 10 MG tablet; 3 po day 1, 2 po day 2 and 3 , 1 po day 4 and 5 and 1/2 po day 6 and 7          Current medications are therapeutic at this time; continue as prescribed.       Janis Bonilla MD

## 2022-07-03 ENCOUNTER — PATIENT MESSAGE (OUTPATIENT)
Dept: INTERNAL MEDICINE CLINIC | Facility: CLINIC | Age: 62
End: 2022-07-03

## 2022-07-05 RX ORDER — PREDNISONE 1 MG/1
5 TABLET ORAL DAILY
Qty: 10 TABLET | Refills: 0 | Status: SHIPPED | OUTPATIENT
Start: 2022-07-05 | End: 2022-07-15

## 2022-07-05 NOTE — TELEPHONE ENCOUNTER
Requested Prescriptions     Pending Prescriptions Disp Refills    predniSONE (DELTASONE) 5 MG tablet 10 tablet 0     Sig: Take 1 tablet by mouth daily for 10 days

## 2022-07-06 DIAGNOSIS — R74.8 ELEVATED LIVER ENZYMES: Primary | ICD-10-CM

## 2022-07-06 LAB
PSA FREE MFR SERPL: 9.1 %
PSA FREE SERPL-MCNC: 0.3 NG/ML
PSA SERPL-MCNC: 3.3 NG/ML

## 2022-07-08 DIAGNOSIS — R21 RASH AND NONSPECIFIC SKIN ERUPTION: Primary | ICD-10-CM

## 2022-07-08 NOTE — TELEPHONE ENCOUNTER
From: Yadiel Da Silva III  Sent: 7/8/2022 6:27 AM EDT  To: [facility] Clinical Staff  Subject: Rash on back is not responding to prednisone     Edil Sen, since we are at another weekend, I wanted to update Emili Cornejo on this rash. As of this morning (7/8/22), which is the 3rd day of my taking the one 5mg prednisone/day, the rash is simply not any better and new areas are continuing to come up. Mostly down my buttocks into my hips, along the back side of my upper legs and now along the very bottom of my lower stomach below my waistline. My back and sides are mostly the same as when she saw me last Friday. These newer areas are much more sensitive and itchy. Is there not a topical steroid cream stronger than the OTC Cortisone-10 that Dereck been using that might help me with this? And is a higher dose of prednisone out of the question?

## 2022-07-08 NOTE — TELEPHONE ENCOUNTER
Requested Prescriptions     Pending Prescriptions Disp Refills    hydrocortisone 2.5 % cream 28 g 0     Sig: Apply topically 4 times daily as needed (Rash) Apply topically 2 times daily.

## 2022-07-14 DIAGNOSIS — R74.8 ELEVATED LIVER ENZYMES: ICD-10-CM

## 2022-07-14 LAB
ALBUMIN SERPL-MCNC: 3.9 G/DL (ref 3.2–4.6)
ALBUMIN/GLOB SERPL: 1.6 {RATIO} (ref 1.2–3.5)
ALP SERPL-CCNC: 71 U/L (ref 50–136)
ALT SERPL-CCNC: 52 U/L (ref 12–65)
AST SERPL-CCNC: 37 U/L (ref 15–37)
BILIRUB DIRECT SERPL-MCNC: 0.2 MG/DL
BILIRUB SERPL-MCNC: 0.8 MG/DL (ref 0.2–1.1)
GLOBULIN SER CALC-MCNC: 2.5 G/DL (ref 2.3–3.5)
PROT SERPL-MCNC: 6.4 G/DL (ref 6.3–8.2)

## 2022-07-19 ENCOUNTER — PATIENT MESSAGE (OUTPATIENT)
Dept: INTERNAL MEDICINE CLINIC | Facility: CLINIC | Age: 62
End: 2022-07-19

## 2022-07-19 DIAGNOSIS — I48.91 ATRIAL FIBRILLATION WITH RVR (HCC): Primary | ICD-10-CM

## 2022-08-19 DIAGNOSIS — I48.91 ATRIAL FIBRILLATION WITH RVR (HCC): ICD-10-CM

## 2022-08-20 LAB
ALBUMIN SERPL-MCNC: 4.1 G/DL (ref 3.2–4.6)
ALBUMIN/GLOB SERPL: 1.4 {RATIO} (ref 1.2–3.5)
ALP SERPL-CCNC: 69 U/L (ref 50–136)
ALT SERPL-CCNC: 33 U/L (ref 12–65)
ANION GAP SERPL CALC-SCNC: 6 MMOL/L (ref 7–16)
AST SERPL-CCNC: 23 U/L (ref 15–37)
BILIRUB SERPL-MCNC: 0.5 MG/DL (ref 0.2–1.1)
BUN SERPL-MCNC: 21 MG/DL (ref 8–23)
CALCIUM SERPL-MCNC: 9.2 MG/DL (ref 8.3–10.4)
CHLORIDE SERPL-SCNC: 110 MMOL/L (ref 98–107)
CO2 SERPL-SCNC: 26 MMOL/L (ref 21–32)
CREAT SERPL-MCNC: 0.8 MG/DL (ref 0.8–1.5)
GLOBULIN SER CALC-MCNC: 2.9 G/DL (ref 2.3–3.5)
GLUCOSE SERPL-MCNC: 76 MG/DL (ref 65–100)
POTASSIUM SERPL-SCNC: 4.7 MMOL/L (ref 3.5–5.1)
PROT SERPL-MCNC: 7 G/DL (ref 6.3–8.2)
SODIUM SERPL-SCNC: 142 MMOL/L (ref 138–145)

## 2022-09-03 ENCOUNTER — PATIENT MESSAGE (OUTPATIENT)
Dept: CARDIOLOGY CLINIC | Age: 62
End: 2022-09-03

## 2022-09-06 NOTE — TELEPHONE ENCOUNTER
From: Ariel Valdez III  To: Dr. Marylou Lara: 9/3/2022 6:41 PM EDT  Subject: Visit of 9/8/22    Dr. Luz Montez, I have my 6-month visit with you on 9/8 and it looks like I will be on my own, so I wanted to see, if face coverings are necessary, is it possible for you to wear a face shield instead of the mask so that I will be able to understand you? I would appreciate it, please let me know. Thank you.

## 2022-09-08 ENCOUNTER — OFFICE VISIT (OUTPATIENT)
Dept: CARDIOLOGY CLINIC | Age: 62
End: 2022-09-08
Payer: MEDICARE

## 2022-09-08 VITALS
BODY MASS INDEX: 23.37 KG/M2 | HEART RATE: 76 BPM | HEIGHT: 69 IN | DIASTOLIC BLOOD PRESSURE: 88 MMHG | WEIGHT: 157.8 LBS | SYSTOLIC BLOOD PRESSURE: 140 MMHG

## 2022-09-08 DIAGNOSIS — Z98.890 S/P MVR (MITRAL VALVE REPAIR): ICD-10-CM

## 2022-09-08 DIAGNOSIS — I48.0 PAROXYSMAL ATRIAL FIBRILLATION (HCC): ICD-10-CM

## 2022-09-08 DIAGNOSIS — R03.0 ELEVATED BLOOD PRESSURE READING WITHOUT DIAGNOSIS OF HYPERTENSION: ICD-10-CM

## 2022-09-08 DIAGNOSIS — I42.8 NONISCHEMIC CARDIOMYOPATHY (HCC): Primary | ICD-10-CM

## 2022-09-08 DIAGNOSIS — I34.0 NON-RHEUMATIC MITRAL REGURGITATION: ICD-10-CM

## 2022-09-08 DIAGNOSIS — Z79.01 ANTICOAGULATED: ICD-10-CM

## 2022-09-08 PROCEDURE — G8427 DOCREV CUR MEDS BY ELIG CLIN: HCPCS | Performed by: INTERNAL MEDICINE

## 2022-09-08 PROCEDURE — 3017F COLORECTAL CA SCREEN DOC REV: CPT | Performed by: INTERNAL MEDICINE

## 2022-09-08 PROCEDURE — 99214 OFFICE O/P EST MOD 30 MIN: CPT | Performed by: INTERNAL MEDICINE

## 2022-09-08 PROCEDURE — 1036F TOBACCO NON-USER: CPT | Performed by: INTERNAL MEDICINE

## 2022-09-08 PROCEDURE — G8420 CALC BMI NORM PARAMETERS: HCPCS | Performed by: INTERNAL MEDICINE

## 2022-09-08 RX ORDER — ASPIRIN 81 MG/1
81 TABLET ORAL DAILY
Qty: 90 TABLET | Refills: 3 | Status: SHIPPED | OUTPATIENT
Start: 2022-09-08

## 2022-09-08 RX ORDER — METOPROLOL SUCCINATE 25 MG/1
25 TABLET, EXTENDED RELEASE ORAL DAILY
Qty: 90 TABLET | Refills: 3 | Status: SHIPPED | OUTPATIENT
Start: 2022-09-08
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
## 2022-09-26 ENCOUNTER — NURSE ONLY (OUTPATIENT)
Dept: INTERNAL MEDICINE CLINIC | Facility: CLINIC | Age: 62
End: 2022-09-26
Payer: MEDICARE

## 2022-09-26 DIAGNOSIS — Z23 FLU VACCINE NEED: Primary | ICD-10-CM

## 2022-09-26 PROCEDURE — 90674 CCIIV4 VAC NO PRSV 0.5 ML IM: CPT | Performed by: INTERNAL MEDICINE

## 2022-09-26 PROCEDURE — G0008 ADMIN INFLUENZA VIRUS VAC: HCPCS | Performed by: INTERNAL MEDICINE

## 2022-10-10 ENCOUNTER — TELEMEDICINE (OUTPATIENT)
Dept: INTERNAL MEDICINE CLINIC | Facility: CLINIC | Age: 62
End: 2022-10-10
Payer: MEDICARE

## 2022-10-10 ENCOUNTER — NURSE ONLY (OUTPATIENT)
Dept: INTERNAL MEDICINE CLINIC | Facility: CLINIC | Age: 62
End: 2022-10-10
Payer: MEDICARE

## 2022-10-10 DIAGNOSIS — I49.9 IRREGULAR HEART RATE: ICD-10-CM

## 2022-10-10 DIAGNOSIS — J06.9 ACUTE URI: Primary | ICD-10-CM

## 2022-10-10 DIAGNOSIS — R05.1 ACUTE COUGH: ICD-10-CM

## 2022-10-10 DIAGNOSIS — I49.9 IRREGULAR HEART RATE: Primary | ICD-10-CM

## 2022-10-10 DIAGNOSIS — I48.0 PAROXYSMAL ATRIAL FIBRILLATION (HCC): Chronic | ICD-10-CM

## 2022-10-10 LAB
BASOPHILS # BLD: 0.1 K/UL (ref 0–0.2)
BASOPHILS NFR BLD: 1 % (ref 0–2)
DIFFERENTIAL METHOD BLD: ABNORMAL
EOSINOPHIL # BLD: 0.1 K/UL (ref 0–0.8)
EOSINOPHIL NFR BLD: 1 % (ref 0.5–7.8)
ERYTHROCYTE [DISTWIDTH] IN BLOOD BY AUTOMATED COUNT: 12.5 % (ref 11.9–14.6)
HCT VFR BLD AUTO: 46.5 % (ref 41.1–50.3)
HGB BLD-MCNC: 15.6 G/DL (ref 13.6–17.2)
IMM GRANULOCYTES # BLD AUTO: 0 K/UL (ref 0–0.5)
IMM GRANULOCYTES NFR BLD AUTO: 0 % (ref 0–5)
LYMPHOCYTES # BLD: 1.3 K/UL (ref 0.5–4.6)
LYMPHOCYTES NFR BLD: 15 % (ref 13–44)
MCH RBC QN AUTO: 31 PG (ref 26.1–32.9)
MCHC RBC AUTO-ENTMCNC: 33.5 G/DL (ref 31.4–35)
MCV RBC AUTO: 92.4 FL (ref 79.6–97.8)
MONOCYTES # BLD: 0.4 K/UL (ref 0.1–1.3)
MONOCYTES NFR BLD: 5 % (ref 4–12)
NEUTS SEG # BLD: 7 K/UL (ref 1.7–8.2)
NEUTS SEG NFR BLD: 78 % (ref 43–78)
NRBC # BLD: 0 K/UL (ref 0–0.2)
PLATELET # BLD AUTO: 293 K/UL (ref 150–450)
PMV BLD AUTO: 9.2 FL (ref 9.4–12.3)
RBC # BLD AUTO: 5.03 M/UL (ref 4.23–5.6)
WBC # BLD AUTO: 9 K/UL (ref 4.3–11.1)

## 2022-10-10 PROCEDURE — 93000 ELECTROCARDIOGRAM COMPLETE: CPT | Performed by: NURSE PRACTITIONER

## 2022-10-10 PROCEDURE — 99214 OFFICE O/P EST MOD 30 MIN: CPT | Performed by: NURSE PRACTITIONER

## 2022-10-10 PROCEDURE — G8482 FLU IMMUNIZE ORDER/ADMIN: HCPCS | Performed by: NURSE PRACTITIONER

## 2022-10-10 PROCEDURE — 3017F COLORECTAL CA SCREEN DOC REV: CPT | Performed by: NURSE PRACTITIONER

## 2022-10-10 PROCEDURE — 1036F TOBACCO NON-USER: CPT | Performed by: NURSE PRACTITIONER

## 2022-10-10 PROCEDURE — G8427 DOCREV CUR MEDS BY ELIG CLIN: HCPCS | Performed by: NURSE PRACTITIONER

## 2022-10-10 PROCEDURE — G8420 CALC BMI NORM PARAMETERS: HCPCS | Performed by: NURSE PRACTITIONER

## 2022-10-10 RX ORDER — CEFUROXIME AXETIL 250 MG/1
250 TABLET ORAL 2 TIMES DAILY
Qty: 20 TABLET | Refills: 0 | Status: SHIPPED | OUTPATIENT
Start: 2022-10-10 | End: 2022-10-17 | Stop reason: ALTCHOICE

## 2022-10-10 ASSESSMENT — ENCOUNTER SYMPTOMS
COUGH: 1
STRIDOR: 0
WHEEZING: 0
SHORTNESS OF BREATH: 0
SINUS PAIN: 1

## 2022-10-10 NOTE — PROGRESS NOTES
EKG in office due to c/o \"irregular heart rhythm on virtual visit this morning. EKG: SR with PAC's; incomplete RBBB. Rate 82. In comparison to 03/08/2022, PAC's are now present. Reviewed by Dr. Ivon Cao. Diagnoses and all orders for this visit:    Irregular heart rate  -     EKG 12 Lead; Future  -     EKG 12 Lead  -     Comprehensive Metabolic Panel; Future  -     CBC with Auto Differential; Future  -     TSH; Future    Stop phenylephrine. Labs today - will contact with results.   Follow up with cardiology; will call for work in.    Kadie Reyez, GEORGI, APRN - CNP

## 2022-10-10 NOTE — PROGRESS NOTES
Ally Perez III (:  1960) is a Established patient, here for evaluation of the following:    Assessment & Plan   Below is the assessment and plan developed based on review of pertinent history, physical exam, labs, studies, and medications. 1. Acute URI  -     cefUROXime (CEFTIN) 250 MG tablet; Take 1 tablet by mouth 2 times daily for 10 days, Disp-20 tablet, R-0Normal  2. Acute cough  3. Irregular heart rate  4. Paroxysmal atrial fibrillation (HCC)    Hold phenylephrine secondary to palpitations. Instructed to come in office for EKG today. Ceftin as prescribed - direction for use and side effects discussed. Increase water intake. Return if symptoms worsen or fail to improve. Subjective   Emery Bender III (: 1960) c/o sore throat, congestion starting 4 days ago. He has green/bloody rhinitis. Cough is productive of white sputum. He tested for COVID over the weekend, which was negative. He received his flu vaccine on 2022. He thinks Mucinex and/or phenylephrine may be causing irregular heartbeat. He has gotten a few warnings on his Apple watch. He denies chest pain, shortness of breath. Review of Systems   Constitutional:  Negative for fever. HENT:  Positive for congestion and sinus pain. Respiratory:  Positive for cough. Negative for shortness of breath, wheezing and stridor. Objective   Patient-Reported Vitals  Patient-Reported Systolic (Top): 675 mmHg  Patient-Reported Diastolic (Bottom): 74 mmHg  Patient-Reported Pulse: 76  Patient-Reported Temperature: 98.0  Patient-Reported Weight: 153  Patient-Reported Height: 59  Patient-Reported Pulse Oximetry: 100     Physical Exam  Constitutional:       Appearance: Normal appearance. HENT:      Nose: Congestion present. Pulmonary:      Effort: Pulmonary effort is normal.   Neurological:      Mental Status: He is alert.    Psychiatric:         Mood and Affect: Mood normal.         Thought Content: Thought content normal.         Judgment: Judgment normal.          Faustino Navas III, was evaluated through a synchronous (real-time) audio-video encounter. The patient (or guardian if applicable) is aware that this is a billable service, which includes applicable co-pays. This Virtual Visit was conducted with patient's (and/or legal guardian's) consent. The visit was conducted pursuant to the emergency declaration under the [address] [place] [address] and the [employer] and "[employer] General Act. Patient identification was verified, and a caregiver was present when appropriate. The patient was located at Home: [address] [place] [address] [address]    Provider was located at [facility] ([address] [place] [address]): [address] [place] [id number]  Jv Guardado, GEORGI, APRN - CNP

## 2022-10-11 LAB
ALBUMIN SERPL-MCNC: 3.9 G/DL (ref 3.2–4.6)
ALBUMIN/GLOB SERPL: 1.3 {RATIO} (ref 1.2–3.5)
ALP SERPL-CCNC: 67 U/L (ref 50–136)
ALT SERPL-CCNC: 24 U/L (ref 12–65)
ANION GAP SERPL CALC-SCNC: 8 MMOL/L (ref 4–13)
AST SERPL-CCNC: 19 U/L (ref 15–37)
BILIRUB SERPL-MCNC: 0.4 MG/DL (ref 0.2–1.1)
BUN SERPL-MCNC: 17 MG/DL (ref 8–23)
CALCIUM SERPL-MCNC: 9.4 MG/DL (ref 8.3–10.4)
CHLORIDE SERPL-SCNC: 110 MMOL/L (ref 101–110)
CO2 SERPL-SCNC: 23 MMOL/L (ref 21–32)
CREAT SERPL-MCNC: 0.7 MG/DL (ref 0.8–1.5)
GLOBULIN SER CALC-MCNC: 3.1 G/DL (ref 2.3–3.5)
GLUCOSE SERPL-MCNC: 122 MG/DL (ref 65–100)
POTASSIUM SERPL-SCNC: 4.1 MMOL/L (ref 3.5–5.1)
PROT SERPL-MCNC: 7 G/DL (ref 6.3–8.2)
SODIUM SERPL-SCNC: 141 MMOL/L (ref 136–145)
TSH, 3RD GENERATION: 2.38 UIU/ML (ref 0.36–3.74)

## 2022-10-17 ENCOUNTER — OFFICE VISIT (OUTPATIENT)
Dept: CARDIOLOGY CLINIC | Age: 62
End: 2022-10-17
Payer: MEDICARE

## 2022-10-17 VITALS
SYSTOLIC BLOOD PRESSURE: 128 MMHG | WEIGHT: 157.2 LBS | HEIGHT: 69 IN | DIASTOLIC BLOOD PRESSURE: 82 MMHG | HEART RATE: 80 BPM | BODY MASS INDEX: 23.28 KG/M2

## 2022-10-17 DIAGNOSIS — I48.0 PAROXYSMAL ATRIAL FIBRILLATION (HCC): ICD-10-CM

## 2022-10-17 DIAGNOSIS — Z98.890 S/P MVR (MITRAL VALVE REPAIR): ICD-10-CM

## 2022-10-17 DIAGNOSIS — I42.8 NONISCHEMIC CARDIOMYOPATHY (HCC): ICD-10-CM

## 2022-10-17 DIAGNOSIS — I34.1 MITRAL VALVE PROLAPSE: ICD-10-CM

## 2022-10-17 DIAGNOSIS — I49.1 PREMATURE ATRIAL CONTRACTION: ICD-10-CM

## 2022-10-17 DIAGNOSIS — R00.2 PALPITATIONS: Primary | ICD-10-CM

## 2022-10-17 PROCEDURE — G8427 DOCREV CUR MEDS BY ELIG CLIN: HCPCS | Performed by: INTERNAL MEDICINE

## 2022-10-17 PROCEDURE — G8482 FLU IMMUNIZE ORDER/ADMIN: HCPCS | Performed by: INTERNAL MEDICINE

## 2022-10-17 PROCEDURE — 3017F COLORECTAL CA SCREEN DOC REV: CPT | Performed by: INTERNAL MEDICINE

## 2022-10-17 PROCEDURE — 1036F TOBACCO NON-USER: CPT | Performed by: INTERNAL MEDICINE

## 2022-10-17 PROCEDURE — 93000 ELECTROCARDIOGRAM COMPLETE: CPT | Performed by: INTERNAL MEDICINE

## 2022-10-17 PROCEDURE — 99214 OFFICE O/P EST MOD 30 MIN: CPT | Performed by: INTERNAL MEDICINE

## 2022-10-17 PROCEDURE — G8420 CALC BMI NORM PARAMETERS: HCPCS | Performed by: INTERNAL MEDICINE
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
## 2022-10-17 NOTE — PATIENT INSTRUCTIONS
Patient Education        Learning About Mindfulness for Stress  What are mindfulness and stress? Stress is what you feel when you have to handle more than you are used to. A lot of things can cause stress. You may feel stress when you go on a job interview, take a test, or run a race. This kind of short-term stress is normal and even useful. It can help you if you need to work hard or react quickly. Stress also can last a long time. Long-term stress is caused by stressful situations or events. Examples of long-term stress include long-term health problems, ongoing problems at work, and conflicts in your family. Long-term stress can harm your health. Mindfulness is a focus only on things happening in the present moment. It's a process of purposefully paying attention to and being aware of your surroundings, your emotions, your thoughts, and how your body feels. You are aware of these things, but you aren't judging these experiences as \"good\" or \"bad. \" Mindfulness can help you learn to calm your mind and body to help you cope with illness, pain, and stress. How does mindfulness help to relieve stress? Mindfulness can help quiet your mind and relax your body. Studies show that it can help some people sleep better, feel less anxious, and bring their blood pressure down. And it's been shown to help some people live and cope better with certain health problems like heart disease, depression, chronic pain, and cancer. How do you practice mindfulness? To be mindful is to pay attention, to be present, and to be accepting. When you're mindful, you do just one thing and you pay close attention to that one thing. For example, you may sit quietly and notice your emotions or how your food tastes and smells. When you're present, you focus on the things that are happening right now. You let go of your thoughts about the past and the future.  When you dwell on the past or the future, you miss moments that can heal and strengthen you. You may miss moments like hearing a child laugh or seeing a friendly face when you think you're all alone. When you're accepting, you don't  the present moment. Instead you accept your thoughts and feelings as they come. You can practice anytime, anywhere, and in any way you choose. You can practice in many ways. Here are a few ideas:  While doing your chores, like washing the dishes, let your mind focus on what's in your hand. What does the dish feel like? Is the water warm or cold? Go outside and take a few deep breaths. What is the air like? Is it warm or cold? When you can, take some time at the start of your day to sit alone and think. Take a slow walk by yourself. Count your steps while you breathe in and out. Try yoga breathing exercises, stretches, and poses to strengthen and relax your muscles. At work, if you can, try to stop for a few moments each hour. Note how your body feels. Let yourself regroup and let your mind settle before you return to what you were doing. If you struggle with anxiety or \"worry thoughts,\" imagine your mind as a blue mariam and your worry thoughts as clouds. Now imagine those worry thoughts floating across your mind's mariam. Just let them pass by as you watch. Follow-up care is a key part of your treatment and safety. Be sure to make and go to all appointments, and call your doctor if you are having problems. It's also a good idea to know your test results and keep a list of the medicines you take. Where can you learn more? Go to https://[employer]. org and sign in to your [facility] account. Enter N274 in the [employer] box to learn more about \"Learning About Mindfulness for Stress. \"     If you do not have an account, please click on the \"Sign Up Now\" link. Current as of: February 9, 2022               Content Version: 13.4  © 0520-7360 Healthwise, Incorporated. Care instructions adapted under license by [facility]  If you have questions about a medical condition or this instruction, always ask your healthcare professional. [place] [address] any warranty or liability for your use of this information.

## 2022-10-24 ENCOUNTER — PATIENT MESSAGE (OUTPATIENT)
Dept: INTERNAL MEDICINE CLINIC | Facility: CLINIC | Age: 62
End: 2022-10-24

## 2022-10-24 DIAGNOSIS — R05.3 PERSISTENT COUGH: Primary | ICD-10-CM

## 2022-10-24 RX ORDER — PREDNISONE 10 MG/1
TABLET ORAL
Qty: 13 TABLET | Refills: 0 | Status: SHIPPED | OUTPATIENT
Start: 2022-10-24 | End: 2022-11-01 | Stop reason: ALTCHOICE

## 2022-10-24 NOTE — TELEPHONE ENCOUNTER
Requested Prescriptions     Signed Prescriptions Disp Refills    predniSONE (DELTASONE) 10 MG tablet 13 tablet 0     Sig: 3 tabs daily x 2 days; 2 tabs daily x 2 days; 1 tab daily x 2 days; 1/2 tab daily x 2 days; then d/c     Authorizing Provider: Jeri Hoff

## 2022-10-24 NOTE — TELEPHONE ENCOUNTER
From: Maria C Aparicio III  To: Marco Velazquez  Sent: 10/24/2022 9:07 AM EDT  Subject: Melissa Chao again after taking antibiotic    Linford Channel, I finally finished the Ceftin on Thursday but yesterday I started coming down with something else. This time its mostly congestion in back of my throat and chest. Coughing so much trying to get it up. I still feel as bad as I did when I saw you on 10/10. Fatigue, ands just a general sense of being sick. The PATs are still there but not as frequent. But if I have another viral issue, Im afraid I wont ever get back to normal. No fever this time. Just the cough and congestion and the overall sense of being sick and fatigued/weak. What can be done to help me get over all this? Let me know. Thank you!

## 2022-11-01 RX ORDER — PREDNISONE 20 MG/1
TABLET ORAL
Qty: 13 TABLET | Refills: 0 | Status: SHIPPED | OUTPATIENT
Start: 2022-11-01 | End: 2022-11-15

## 2022-11-02 ENCOUNTER — TELEMEDICINE (OUTPATIENT)
Dept: INTERNAL MEDICINE CLINIC | Facility: CLINIC | Age: 62
End: 2022-11-02
Payer: MEDICARE

## 2022-11-02 DIAGNOSIS — Z20.828 EXPOSURE TO INFLUENZA: Primary | ICD-10-CM

## 2022-11-02 DIAGNOSIS — R05.1 ACUTE COUGH: ICD-10-CM

## 2022-11-02 PROCEDURE — G8427 DOCREV CUR MEDS BY ELIG CLIN: HCPCS | Performed by: NURSE PRACTITIONER

## 2022-11-02 PROCEDURE — G8482 FLU IMMUNIZE ORDER/ADMIN: HCPCS | Performed by: NURSE PRACTITIONER

## 2022-11-02 PROCEDURE — G8420 CALC BMI NORM PARAMETERS: HCPCS | Performed by: NURSE PRACTITIONER

## 2022-11-02 PROCEDURE — 1036F TOBACCO NON-USER: CPT | Performed by: NURSE PRACTITIONER

## 2022-11-02 PROCEDURE — 3017F COLORECTAL CA SCREEN DOC REV: CPT | Performed by: NURSE PRACTITIONER

## 2022-11-02 PROCEDURE — 99213 OFFICE O/P EST LOW 20 MIN: CPT | Performed by: NURSE PRACTITIONER

## 2022-11-02 RX ORDER — DEXTROMETHORPHAN HYDROBROMIDE AND PROMETHAZINE HYDROCHLORIDE 15; 6.25 MG/5ML; MG/5ML
5 SYRUP ORAL NIGHTLY PRN
Qty: 60 ML | Refills: 0 | Status: SHIPPED | OUTPATIENT
Start: 2022-11-02 | End: 2022-11-14

## 2022-11-02 RX ORDER — OSELTAMIVIR PHOSPHATE 75 MG/1
75 CAPSULE ORAL 2 TIMES DAILY
Qty: 10 CAPSULE | Refills: 0 | Status: SHIPPED | OUTPATIENT
Start: 2022-11-02 | End: 2022-11-07

## 2022-11-02 ASSESSMENT — ENCOUNTER SYMPTOMS
COUGH: 1
SORE THROAT: 1

## 2022-11-02 NOTE — PROGRESS NOTES
Irais Terry III (:  1960) is a Established patient, here for evaluation of the following:    Assessment & Plan   Below is the assessment and plan developed based on review of pertinent history, physical exam, labs, studies, and medications. 1. Exposure to influenza  -     oseltamivir (TAMIFLU) 75 MG capsule; Take 1 capsule by mouth 2 times daily for 5 days, Disp-10 capsule, R-0Normal  2. Acute cough  -     promethazine-dextromethorphan (PROMETHAZINE-DM) 6.25-15 MG/5ML syrup; Take 5 mLs by mouth nightly as needed for Cough, Disp-60 mL, R-0Normal    Tamiflu and promethazine DM as prescribed - directions for use and side effects discussed; cautioned for sedation. Recommended he sleep prone or on side. Deep breathing exercises and/or incentive spirometry hourly on the waking hour and every 3 hours at night. Encouraged he continue to self monitor their symptoms in home isolation. Avoid leaving home unless completely necessary. Mask at all times and maintain social distance (6 ft) in public. Rest; drink plenty of fluids. Supplement immune system with Vitamin D 2000 international units  daily, Vitamin C 500mg daily and Zinc 50mg daily. If you have trouble breathing, a fever > 100.4 without reduction with medication (Tylenol/Advil/ibuprofen), or any other concerning symptoms, please travel to urgent care and/or ER for consultation. Subjective   Emery Noel III (: 1960) c/o cough, congestion x two weeks. Fever started two days ago; spouse with influenza. He has had flu vaccine in September. Cough  This is a new problem. The current episode started 1 to 4 weeks ago. The problem has been waxing and waning. The problem occurs every few minutes. The cough is Non-productive. Associated symptoms include chills, a fever and a sore throat. He has tried OTC cough suppressant and oral steroids for the symptoms. The treatment provided mild relief. Fever   This is a new problem.  The current episode started yesterday. The problem occurs daily. The problem has been waxing and waning. The maximum temperature noted was 99 to 99.9 F. The temperature was taken using an oral thermometer. Associated symptoms include coughing and a sore throat. Review of Systems   Constitutional:  Positive for chills and fever. HENT:  Positive for sore throat. Respiratory:  Positive for cough. Objective   Patient-Reported Vitals  Patient-Reported Systolic (Top): 747 mmHg  Patient-Reported Diastolic (Bottom): 81 mmHg  Patient-Reported Pulse: 91  Patient-Reported Temperature: 99.8  Patient-Reported Weight: 153.8  Patient-Reported Height: 59  Patient-Reported Pulse Oximetry: 98     Physical Exam  Constitutional:       Appearance: Normal appearance. HENT:      Nose: Congestion present. Pulmonary:      Effort: Pulmonary effort is normal.   Neurological:      Mental Status: He is alert. Psychiatric:         Mood and Affect: Mood normal.         Thought Content: Thought content normal.         Judgment: Judgment normal.     Christi Dutta III, was evaluated through a synchronous (real-time) audio-video encounter. The patient (or guardian if applicable) is aware that this is a billable service, which includes applicable co-pays. This Virtual Visit was conducted with patient's (and/or legal guardian's) consent. The visit was conducted pursuant to the emergency declaration under the [address] [place] [address], [address] [place] [address] authority and the [employer] Act. Patient identification was verified, and a caregiver was present when appropriate. The patient was located at Home: [address] [place] [address] [address] [place] [address].    Provider was located at [facility] ([address] [place] [address]): [address] [place] [address] 470  Jv Coley 149, NP, APRN - CNP

## 2022-11-03 DIAGNOSIS — R05.1 ACUTE COUGH: ICD-10-CM

## 2022-11-03 RX ORDER — DEXTROMETHORPHAN HYDROBROMIDE AND PROMETHAZINE HYDROCHLORIDE 15; 6.25 MG/5ML; MG/5ML
5 SYRUP ORAL NIGHTLY PRN
Qty: 60 ML | Refills: 0 | OUTPATIENT
Start: 2022-11-03 | End: 2022-11-15

## 2022-11-13 ENCOUNTER — PATIENT MESSAGE (OUTPATIENT)
Dept: INTERNAL MEDICINE CLINIC | Facility: CLINIC | Age: 62
End: 2022-11-13

## 2022-11-13 DIAGNOSIS — R09.89 CHRONIC SINUS COMPLAINTS: Primary | ICD-10-CM

## 2022-11-14 NOTE — TELEPHONE ENCOUNTER
From: Jairo Tim III  To: Luz Elena Dar  Sent: 11/13/2022 4:26 PM EST  Subject: Sinus issues still present     Juan Milton, I decided I better go ahead and write you back. I am feeling some better from the flu that I saw you about on 11/02, but through the days after I saw you, I have had really bad congestion that I have blown my nose around the clock. The phlegm stayed mostly clear until about this past Thursday when it started to have blood in it. It is coming from the same left side as when I had the sinus infection you gave me Ceftin for back in October. I also have a sore in my nose on the same left side. My cheekbone on that side has been hurting some and my back top teeth on that side are feeling sensitive like in October. So, I dont know if I am getting another sinus infection or not. What should I do? I have an appointment tomorrow (Monday) morning at 9:15 so I wont be back home until about 11. Let me know when you can on what you advise. I just want to get to feeling better. Thank you.

## 2022-11-15 ENCOUNTER — OFFICE VISIT (OUTPATIENT)
Dept: INTERNAL MEDICINE CLINIC | Facility: CLINIC | Age: 62
End: 2022-11-15
Payer: MEDICARE

## 2022-11-15 VITALS — BODY MASS INDEX: 23.7 KG/M2 | HEIGHT: 69 IN | WEIGHT: 160 LBS

## 2022-11-15 DIAGNOSIS — Z71.89 ACP (ADVANCE CARE PLANNING): ICD-10-CM

## 2022-11-15 DIAGNOSIS — J34.89 NASAL LESION: ICD-10-CM

## 2022-11-15 DIAGNOSIS — Z00.00 WELCOME TO MEDICARE PREVENTIVE VISIT: ICD-10-CM

## 2022-11-15 DIAGNOSIS — I50.22 CHRONIC SYSTOLIC (CONGESTIVE) HEART FAILURE (HCC): ICD-10-CM

## 2022-11-15 DIAGNOSIS — R09.89 CHRONIC SINUS COMPLAINTS: Primary | ICD-10-CM

## 2022-11-15 PROCEDURE — G0402 INITIAL PREVENTIVE EXAM: HCPCS | Performed by: NURSE PRACTITIONER

## 2022-11-15 PROCEDURE — 96372 THER/PROPH/DIAG INJ SC/IM: CPT | Performed by: NURSE PRACTITIONER

## 2022-11-15 PROCEDURE — 3017F COLORECTAL CA SCREEN DOC REV: CPT | Performed by: NURSE PRACTITIONER

## 2022-11-15 RX ORDER — AZITHROMYCIN 250 MG/1
250 TABLET, FILM COATED ORAL SEE ADMIN INSTRUCTIONS
Qty: 6 TABLET | Refills: 0 | Status: SHIPPED | OUTPATIENT
Start: 2022-11-15 | End: 2022-11-20

## 2022-11-15 RX ORDER — METHYLPREDNISOLONE SODIUM SUCCINATE 40 MG/ML
40 INJECTION, POWDER, LYOPHILIZED, FOR SOLUTION INTRAMUSCULAR; INTRAVENOUS ONCE
Status: COMPLETED | OUTPATIENT
Start: 2022-11-15 | End: 2022-11-15

## 2022-11-15 RX ORDER — PREDNISONE 20 MG/1
TABLET ORAL
Qty: 13 TABLET | Refills: 0 | Status: SHIPPED | OUTPATIENT
Start: 2022-11-16 | End: 2022-11-24

## 2022-11-15 RX ADMIN — METHYLPREDNISOLONE SODIUM SUCCINATE 40 MG: 40 INJECTION, POWDER, LYOPHILIZED, FOR SOLUTION INTRAMUSCULAR; INTRAVENOUS at 13:35

## 2022-11-15 ASSESSMENT — PATIENT HEALTH QUESTIONNAIRE - PHQ9
7. TROUBLE CONCENTRATING ON THINGS, SUCH AS READING THE NEWSPAPER OR WATCHING TELEVISION: 0
2. FEELING DOWN, DEPRESSED OR HOPELESS: 1
SUM OF ALL RESPONSES TO PHQ9 QUESTIONS 1 & 2: 2
SUM OF ALL RESPONSES TO PHQ QUESTIONS 1-9: 4
8. MOVING OR SPEAKING SO SLOWLY THAT OTHER PEOPLE COULD HAVE NOTICED. OR THE OPPOSITE, BEING SO FIGETY OR RESTLESS THAT YOU HAVE BEEN MOVING AROUND A LOT MORE THAN USUAL: 0
4. FEELING TIRED OR HAVING LITTLE ENERGY: 1
10. IF YOU CHECKED OFF ANY PROBLEMS, HOW DIFFICULT HAVE THESE PROBLEMS MADE IT FOR YOU TO DO YOUR WORK, TAKE CARE OF THINGS AT HOME, OR GET ALONG WITH OTHER PEOPLE: 0
3. TROUBLE FALLING OR STAYING ASLEEP: 1
5. POOR APPETITE OR OVEREATING: 0
9. THOUGHTS THAT YOU WOULD BE BETTER OFF DEAD, OR OF HURTING YOURSELF: 0
1. LITTLE INTEREST OR PLEASURE IN DOING THINGS: 1
6. FEELING BAD ABOUT YOURSELF - OR THAT YOU ARE A FAILURE OR HAVE LET YOURSELF OR YOUR FAMILY DOWN: 0
SUM OF ALL RESPONSES TO PHQ QUESTIONS 1-9: 4

## 2022-11-15 ASSESSMENT — LIFESTYLE VARIABLES
HOW MANY STANDARD DRINKS CONTAINING ALCOHOL DO YOU HAVE ON A TYPICAL DAY: PATIENT DOES NOT DRINK
HOW OFTEN DO YOU HAVE A DRINK CONTAINING ALCOHOL: NEVER

## 2022-11-15 NOTE — PROGRESS NOTES
Medicare Annual Wellness Visit    Christi Dutta III is here for Congestion and Medicare AWV    Assessment & Plan   Chronic sinus complaints  -     predniSONE (DELTASONE) 20 MG tablet; 3 tabs daily x 2 days; 2 tabs daily x 2 days; 1 tab daily x 2 days; 1/2 tab daily x 2 days; then d/c, Disp-13 tablet, R-0Normal  -     methylPREDNISolone sodium (SOLU-MEDROL) injection 40 mg; 40 mg, IntraMUSCular, ONCE, 1 dose, On Tue 11/15/22 at 1315  -     azithromycin (ZITHROMAX) 250 MG tablet; Take 1 tablet by mouth See Admin Instructions for 5 days 500mg on day 1 followed by 250mg on days 2 - 5, Disp-6 tablet, [id number]  Chronic systolic (congestive) heart failure  Assessment & Plan:   Monitored by specialist- no acute findings meriting change in the plan  Nasal lesion  -     mupirocin (BACTROBAN NASAL) 2 % nasal ointment; Take by Nasal route 2 times daily. , Disp-1 g, R-0, Normal  Welcome to Medicare preventive visit  ACP (advance care planning)  [facility] - Referral to [facility] Clinical Specialist      Solumedrol in office. Bactroban nasal ointment, Zpak and prednisone taper as prescribed - directions for use and side effects discussed. Continue OTC Benadryl 25mg TID prn (cautioned for sedation). Continue saline sinus spray. Otherwise, continue current medications. Scheduled for sinus CT on 11/18/2022; due to the chronicity of symptoms, I recommended he keep this imaging study appointment. Recommendations for Preventive Services Due: see orders and patient instructions/AVS.  Recommended screening schedule for the next 5-10 years is provided to the patient in written form: see Patient Instructions/AVS.    Follow up as scheduled and PRN. Subjective   The following acute and/or chronic problems were also addressed today:  He has had nasal congestion since having the flu a couple weeks ago. Last week, the drainage became blood-tinged rhinitis. Afebrile. He is lightly blowing his nose.   He does have a sore in his left nostril. No epistaxis. He has been putting OTC polysporin in his nose. If he takes Zyrtec, he states it isn't helpful. He is taking OTC Benadryl 25mg TID which helps; does not cause sedation. Patient's complete Health Risk Assessment and screening values have been reviewed and are found in Flowsheets. The following problems were reviewed today and where indicated follow up appointments were made and/or referrals ordered. Positive Risk Factor Screenings with Interventions:             General Health and ACP:  General  In general, how would you say your health is?: Excellent  In the past 7 days, have you experienced any of the following: New or Increased Pain, New or Increased Fatigue, Loneliness, Social Isolation, Stress or Anger?: No  Do you get the social and emotional support that you need?: Yes  Do you have a Living Will?: (!) No    Advance Directives       Power of  Living Will ACP-Advance Directive ACP-Power of     Not on File Not on File Not on File Not on File          General Health Risk Interventions:  No Living Will: Patient referred to ACP Clinical Specialist              Objective   Vitals:    11/15/22 1235   Weight: 160 lb (72.6 kg)   Height: 5' 9\" (1.753 m)      Body mass index is 23.63 kg/m².       General Appearance: alert and oriented to person, place and time, well developed and well- nourished, in no acute distress  Head: normocephalic and atraumatic  ENT: tympanic membrane, external ear and ear canal normal right; left TM dull/intact; nose without deformity, nasal mucosa and turbinates irritated without polyps; no active bleeding  Neck: supple and non-tender without mass, no thyromegaly or thyroid nodules, no cervical lymphadenopathy  Pulmonary/Chest: clear to auscultation bilaterally- no wheezes, rales or rhonchi, normal air movement, no respiratory distress  Cardiovascular: normal rate, regular rhythm, normal S1 and S2, no murmurs, rubs, clicks, or gallops, distal pulses intact, no carotid bruits  Extremities: no cyanosis, clubbing or edema       No Known Allergies  Prior to Visit Medications    Medication Sig Taking? Authorizing Provider   predniSONE (DELTASONE) 20 MG tablet 3 tabs daily x 2 days; 2 tabs daily x 2 days; 1 tab daily x 2 days; 1/2 tab daily x 2 days; then d/c Yes JENNIFER Couch CNP   mupirocin (BACTROBAN NASAL) 2 % nasal ointment Take by Nasal route 2 times daily. Yes JENNIFER Couch CNP   azithromycin (ZITHROMAX) 250 MG tablet Take 1 tablet by mouth See Admin Instructions for 5 days 500mg on day 1 followed by 250mg on days 2 - 5 Yes JENNIFER Couch CNP   apixaban (ELIQUIS) 5 MG TABS tablet Take 1 tablet by mouth 2 times daily TAKE 1 TABLET TWICE A DAY FOR TREATMENT TO PREVENT BLOOD CLOTS IN CHRONIC ATRIAL FIBRILLATION Yes Beverly Rose MD   metoprolol succinate (TOPROL XL) 25 MG extended release tablet Take 1 tablet by mouth daily Yes Beverly Rose MD   aspirin 81 MG EC tablet Take 1 tablet by mouth daily TAKE 1 TABLET BY MOUTH DAILY Yes Beverly Rose MD   LORazepam (ATIVAN) 1 MG tablet Take 1 mg by mouth 2 times daily.  Yes Ar Automatic Reconciliation   sertraline (ZOLOFT) 100 MG tablet Take by mouth 2 times daily Yes Ar Automatic Reconciliation   zolpidem (AMBIEN) 10 MG tablet TAKE 1/2-1 TABLET BY MOUTH AT BEDTIME AS NEEDED FOR SLEEP Yes Ar Automatic Reconciliation       CareTeam (Including outside providers/suppliers regularly involved in providing care):   Patient Care Team:  Lesli Yoo MD as PCP - Aissatou Amador MD as PCP - Carie Liriano Provider     Reviewed and updated this visit:  Tobacco  Allergies  Meds  Problems  Med Hx  Surg Hx  Soc Hx  Fam Hx          Aria Pryor NP, APRN - CNP

## 2022-11-15 NOTE — PATIENT INSTRUCTIONS
Personalized Preventive Plan for Emanuel Bowels III - 11/15/2022  Medicare offers a range of preventive health benefits. Some of the tests and screenings are paid in full while other may be subject to a deductible, co-insurance, and/or copay. Some of these benefits include a comprehensive review of your medical history including lifestyle, illnesses that may run in your family, and various assessments and screenings as appropriate. After reviewing your medical record and screening and assessments performed today your provider may have ordered immunizations, labs, imaging, and/or referrals for you. A list of these orders (if applicable) as well as your Preventive Care list are included within your After Visit Summary for your review. Other Preventive Recommendations:    A preventive eye exam performed by an eye specialist is recommended every 1-2 years to screen for glaucoma; cataracts, macular degeneration, and other eye disorders. A preventive dental visit is recommended every 6 months. Try to get at least 150 minutes of exercise per week or 10,000 steps per day on a pedometer . Order or download the FREE \"Exercise & Physical Activity: Your Everyday Guide\" from [employer]. Call 6-397.837.6491 or search [employer] online. You need 6587-4243 mg of calcium and 7236-2880 IU of vitamin D per day. It is possible to meet your calcium requirement with diet alone, but a vitamin D supplement is usually necessary to meet this goal.  When exposed to the sun, use a sunscreen that protects against both UVA and UVB radiation with an SPF of 30 or greater. Reapply every 2 to 3 hours or after sweating, drying off with a towel, or swimming. Always wear a seat belt when traveling in a car. Always wear a helmet when riding a bicycle or motorcycle.

## 2022-11-16 ENCOUNTER — CLINICAL DOCUMENTATION (OUTPATIENT)
Dept: CARE COORDINATION | Facility: CLINIC | Age: 62
End: 2022-11-16

## 2022-11-16 NOTE — ACP (ADVANCE CARE PLANNING)
Advance Care Planning   Ambulatory ACP Specialist Patient Outreach    Date:  11/16/2022, 11/23/2022, 12/05/2022  ACP Specialist:  Aida Champion    Outreach call to patient in follow-up to ACP Specialist referral from: Dg Hare MD    [x] PCP  [] Provider   [] Ambulatory Care Management [] Other for Reason:    [] Advance Directive Assistance  [] Code Status Discussion  [] Complete Portable DNR Order  [] Discuss Goals of Care  [] Complete POST/MOST  [] Early ACP Decision-Making  [x] Other    Date Referral Received:11/15/2022    Today's Outreach:  [x] First   [] Second  [] Third                               Third outreach made by []  phone  [] email []   DEONTICS     Intervention:  [] Spoke with Patient  [x] Left VM requesting return call      Outcome:LVM requesting return call. If no response will follow again in one week. 11/23/2022 LVM requesting return call. If no response will follow again in one week. 12/05/2022 Final LVM requesting return call. Will move to close and be available if patient reaches out. Next Step:   [] ACP scheduled conversation  [x] Outreach again in one week               [] Email / Mail ACP Info Sheets  [] Email / Mail Advance Directive            [] Close Referral. Routing closure to referring provider/staff and to ACP Specialist . [] Closure Letter mailed to Patient with Invitation to Contact ACP Specialist if/when ready.     Thank you for this referral.

## 2022-11-16 NOTE — Clinical Note
Thank you for this referral. LVM requesting return call If no response will follow again in one week.

## 2022-11-23 ENCOUNTER — CLINICAL DOCUMENTATION (OUTPATIENT)
Dept: CARE COORDINATION | Facility: CLINIC | Age: 62
End: 2022-11-23

## 2022-12-05 ENCOUNTER — CLINICAL DOCUMENTATION (OUTPATIENT)
Dept: CARE COORDINATION | Facility: CLINIC | Age: 62
End: 2022-12-05

## 2022-12-07 RX ORDER — AMOXICILLIN 500 MG/1
2000 CAPSULE ORAL
Qty: 4 CAPSULE | Refills: 5 | Status: SHIPPED | OUTPATIENT
Start: 2022-12-07 | End: 2022-12-07

## 2023-01-06 ENCOUNTER — OFFICE VISIT (OUTPATIENT)
Dept: INTERNAL MEDICINE CLINIC | Facility: CLINIC | Age: 63
End: 2023-01-06
Payer: MEDICARE

## 2023-01-06 VITALS
SYSTOLIC BLOOD PRESSURE: 124 MMHG | DIASTOLIC BLOOD PRESSURE: 70 MMHG | BODY MASS INDEX: 24.17 KG/M2 | HEIGHT: 69 IN | WEIGHT: 163.2 LBS

## 2023-01-06 DIAGNOSIS — E78.00 HYPERCHOLESTEREMIA: ICD-10-CM

## 2023-01-06 DIAGNOSIS — I48.0 PAROXYSMAL ATRIAL FIBRILLATION (HCC): ICD-10-CM

## 2023-01-06 DIAGNOSIS — F32.0 MAJOR DEPRESSIVE DISORDER, SINGLE EPISODE, MILD (HCC): ICD-10-CM

## 2023-01-06 DIAGNOSIS — F41.9 ANXIETY: ICD-10-CM

## 2023-01-06 DIAGNOSIS — I50.22 CHRONIC SYSTOLIC (CONGESTIVE) HEART FAILURE (HCC): Primary | ICD-10-CM

## 2023-01-06 DIAGNOSIS — G47.01 INSOMNIA DUE TO MEDICAL CONDITION: ICD-10-CM

## 2023-01-06 LAB
ALBUMIN SERPL-MCNC: 4.3 G/DL (ref 3.2–4.6)
ALBUMIN/GLOB SERPL: 1.5 (ref 0.4–1.6)
ALP SERPL-CCNC: 72 U/L (ref 50–136)
ALT SERPL-CCNC: 35 U/L (ref 12–65)
ANION GAP SERPL CALC-SCNC: 9 MMOL/L (ref 2–11)
AST SERPL-CCNC: 35 U/L (ref 15–37)
BASOPHILS # BLD: 0.1 K/UL (ref 0–0.2)
BASOPHILS NFR BLD: 1 % (ref 0–2)
BILIRUB SERPL-MCNC: 0.7 MG/DL (ref 0.2–1.1)
BUN SERPL-MCNC: 14 MG/DL (ref 8–23)
CALCIUM SERPL-MCNC: 9.5 MG/DL (ref 8.3–10.4)
CHLORIDE SERPL-SCNC: 108 MMOL/L (ref 101–110)
CHOLEST SERPL-MCNC: 157 MG/DL
CO2 SERPL-SCNC: 24 MMOL/L (ref 21–32)
CREAT SERPL-MCNC: 0.9 MG/DL (ref 0.8–1.5)
DIFFERENTIAL METHOD BLD: NORMAL
EOSINOPHIL # BLD: 0.1 K/UL (ref 0–0.8)
EOSINOPHIL NFR BLD: 1 % (ref 0.5–7.8)
ERYTHROCYTE [DISTWIDTH] IN BLOOD BY AUTOMATED COUNT: 13.5 % (ref 11.9–14.6)
GLOBULIN SER CALC-MCNC: 2.8 G/DL (ref 2.8–4.5)
GLUCOSE SERPL-MCNC: 95 MG/DL (ref 65–100)
HCT VFR BLD AUTO: 48.5 % (ref 41.1–50.3)
HDLC SERPL-MCNC: 53 MG/DL (ref 40–60)
HDLC SERPL: 3
HGB BLD-MCNC: 16.3 G/DL (ref 13.6–17.2)
IMM GRANULOCYTES # BLD AUTO: 0 K/UL (ref 0–0.5)
IMM GRANULOCYTES NFR BLD AUTO: 0 % (ref 0–5)
LDLC SERPL CALC-MCNC: 79.8 MG/DL
LYMPHOCYTES # BLD: 1.7 K/UL (ref 0.5–4.6)
LYMPHOCYTES NFR BLD: 20 % (ref 13–44)
MCH RBC QN AUTO: 30.4 PG (ref 26.1–32.9)
MCHC RBC AUTO-ENTMCNC: 33.6 G/DL (ref 31.4–35)
MCV RBC AUTO: 90.3 FL (ref 82–102)
MONOCYTES # BLD: 0.5 K/UL (ref 0.1–1.3)
MONOCYTES NFR BLD: 6 % (ref 4–12)
NEUTS SEG # BLD: 6.1 K/UL (ref 1.7–8.2)
NEUTS SEG NFR BLD: 72 % (ref 43–78)
NRBC # BLD: 0 K/UL (ref 0–0.2)
PLATELET # BLD AUTO: 242 K/UL (ref 150–450)
PMV BLD AUTO: 9.8 FL (ref 9.4–12.3)
POTASSIUM SERPL-SCNC: 4.7 MMOL/L (ref 3.5–5.1)
PROT SERPL-MCNC: 7.1 G/DL (ref 6.3–8.2)
RBC # BLD AUTO: 5.37 M/UL (ref 4.23–5.6)
SODIUM SERPL-SCNC: 141 MMOL/L (ref 133–143)
TRIGL SERPL-MCNC: 121 MG/DL (ref 35–150)
VLDLC SERPL CALC-MCNC: 24.2 MG/DL (ref 6–23)
WBC # BLD AUTO: 8.4 K/UL (ref 4.3–11.1)

## 2023-01-06 PROCEDURE — G8427 DOCREV CUR MEDS BY ELIG CLIN: HCPCS | Performed by: INTERNAL MEDICINE

## 2023-01-06 PROCEDURE — G8482 FLU IMMUNIZE ORDER/ADMIN: HCPCS | Performed by: INTERNAL MEDICINE

## 2023-01-06 PROCEDURE — 3017F COLORECTAL CA SCREEN DOC REV: CPT | Performed by: INTERNAL MEDICINE

## 2023-01-06 PROCEDURE — G8420 CALC BMI NORM PARAMETERS: HCPCS | Performed by: INTERNAL MEDICINE

## 2023-01-06 PROCEDURE — 99214 OFFICE O/P EST MOD 30 MIN: CPT | Performed by: INTERNAL MEDICINE

## 2023-01-06 PROCEDURE — 1036F TOBACCO NON-USER: CPT | Performed by: INTERNAL MEDICINE

## 2023-01-06 RX ORDER — CLOBETASOL PROPIONATE 0.5 MG/G
CREAM TOPICAL
COMMUNITY
Start: 2022-11-28

## 2023-01-06 ASSESSMENT — PATIENT HEALTH QUESTIONNAIRE - PHQ9
SUM OF ALL RESPONSES TO PHQ QUESTIONS 1-9: 0
SUM OF ALL RESPONSES TO PHQ9 QUESTIONS 1 & 2: 0
2. FEELING DOWN, DEPRESSED OR HOPELESS: 0
SUM OF ALL RESPONSES TO PHQ QUESTIONS 1-9: 0
SUM OF ALL RESPONSES TO PHQ QUESTIONS 1-9: 0
1. LITTLE INTEREST OR PLEASURE IN DOING THINGS: 0
SUM OF ALL RESPONSES TO PHQ QUESTIONS 1-9: 0

## 2023-01-06 NOTE — PROGRESS NOTES
HPI: Jon Baptiste III (: 1960)    Has been slow to recover since illness and feels like when he exercises his HR elevates and does not have stamina    Has seen Cardiology    Sees Dr Maria Esther Thompson for his anxiety and insomnia     Problem List:  Patient Active Problem List   Diagnosis    Mitral valve prolapse    Anxiety    Atrial fibrillation with RVR ([address]    Anticoagulated    First degree AV block    Nonischemic cardiomyopathy ([address]    Insomnia    Non-rheumatic mitral regurgitation    Mobitz (type) I (Wenckebach's) atrioventricular block    Major depressive disorder, single episode, mild (HCC)    Paroxysmal atrial fibrillation (HCC)    S/P MVR (mitral valve repair)    Mitral valve regurgitation    Trochanteric bursitis of left hip    Chronic systolic (congestive) heart failure       History:  Past Medical History:   Diagnosis Date    Allergic rhinitis due to other allergen 2015    Anticoagulated 4/3/2018    Anxiety state, unspecified 2015    CAD (coronary artery disease)     mild non obstructive , awaitng [facility] for mitral valve repair/replacement    Depression     started with loss of parents     First degree hemorrhoids 2017    Insomnia, unspecified 2015    Lipoma 2015    Mitral valve regurgitation     Paroxysmal A-fib (HCC)          Unspecified adjustment reaction 2015       Allergies:  No Known Allergies    Current Medications:  Current Outpatient Medications   Medication Sig Dispense Refill    clobetasol (TEMOVATE) 0.05 % cream APPLY TO AFFECTED AREAS TWICE A DAY FOR TWO WEEKS ON AND THEN ONE WEEK OFF      mupirocin (BACTROBAN NASAL) 2 % nasal ointment Take by Nasal route 2 times daily.  1 g 0    apixaban (ELIQUIS) 5 MG TABS tablet Take 1 tablet by mouth 2 times daily TAKE 1 TABLET TWICE A DAY FOR TREATMENT TO PREVENT BLOOD CLOTS IN CHRONIC ATRIAL FIBRILLATION 180 tablet 3    metoprolol succinate (TOPROL XL) 25 MG extended release tablet Take 1 tablet by mouth daily 90 tablet 3    aspirin 81 MG EC tablet Take 1 tablet by mouth daily TAKE 1 TABLET BY MOUTH DAILY 90 tablet 3    LORazepam (ATIVAN) 1 MG tablet Take 1 mg by mouth 2 times daily. sertraline (ZOLOFT) 100 MG tablet Take by mouth 2 times daily      zolpidem (AMBIEN) 10 MG tablet TAKE 1/2-1 TABLET BY MOUTH AT BEDTIME AS NEEDED FOR SLEEP       No current facility-administered medications for this visit. Review of Systems:  Review of Systems   Constitutional:  Positive for fatigue. Mild wt gain   Cardiovascular:  Negative for chest pain. Psychiatric/Behavioral:  Positive for sleep disturbance. The patient is nervous/anxious. All other systems reviewed and are negative. Vitals:  /70   Ht 5' 9\" (1.753 m)   Wt 163 lb 3.2 oz (74 kg)   BMI 24.10 kg/m²     Physical Exam:  Physical Exam  Vitals reviewed. Constitutional:       Appearance: Normal appearance. HENT:      Head: Normocephalic and atraumatic. Cardiovascular:      Rate and Rhythm: Normal rate and regular rhythm. Heart sounds: Normal heart sounds. Pulmonary:      Effort: Pulmonary effort is normal.      Breath sounds: Normal breath sounds. Musculoskeletal:         General: Normal range of motion. Cervical back: Normal range of motion and neck supple. Skin:     General: Skin is warm and dry. Neurological:      General: No focal deficit present. Mental Status: He is alert and oriented to person, place, and time. Psychiatric:         Mood and Affect: Mood normal.         Behavior: Behavior normal.         Thought Content: Thought content normal.         Judgment: Judgment normal.        Assessment/Plan:   Richard Navarro was seen today for follow-up. Diagnoses and all orders for this visit:    Chronic systolic (congestive) heart failure (HCC)    Major depressive disorder, single episode, mild (HCC)    Paroxysmal atrial fibrillation ([facility]  -     Comprehensive Metabolic Panel;  Future  -     CBC with Auto Differential; Future    Anxiety    Insomnia due to medical condition    Hypercholesteremia  -     Lipid Panel; Future      Long discussion about need for regular exercise program   Given example of walking/jogging and needs to do more stretching and build up to exercise  Needs to consider max HR of about 130 when exercising    Explained to pt that Covid, flu and sinus can all cause issues to decrease immune system and could consider adding Emergen C supplement when he feels under the weather  Current medications are therapeutic at this time; continue as prescribed.         Ewa Douglas MD
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
## 2023-03-13 ENCOUNTER — OFFICE VISIT (OUTPATIENT)
Dept: CARDIOLOGY CLINIC | Age: 63
End: 2023-03-13
Payer: MEDICARE

## 2023-03-13 VITALS
WEIGHT: 160 LBS | DIASTOLIC BLOOD PRESSURE: 82 MMHG | HEIGHT: 69 IN | SYSTOLIC BLOOD PRESSURE: 130 MMHG | HEART RATE: 72 BPM | BODY MASS INDEX: 23.7 KG/M2

## 2023-03-13 DIAGNOSIS — Z98.890 S/P MVR (MITRAL VALVE REPAIR): ICD-10-CM

## 2023-03-13 DIAGNOSIS — I50.22 CHRONIC HFREF (HEART FAILURE WITH REDUCED EJECTION FRACTION) (HCC): Primary | ICD-10-CM

## 2023-03-13 DIAGNOSIS — I42.8 NONISCHEMIC CARDIOMYOPATHY (HCC): ICD-10-CM

## 2023-03-13 DIAGNOSIS — I48.0 PAROXYSMAL ATRIAL FIBRILLATION (HCC): ICD-10-CM

## 2023-03-13 DIAGNOSIS — I34.0 NONRHEUMATIC MITRAL VALVE REGURGITATION: ICD-10-CM

## 2023-03-13 PROCEDURE — 99213 OFFICE O/P EST LOW 20 MIN: CPT | Performed by: INTERNAL MEDICINE

## 2023-03-13 PROCEDURE — G8420 CALC BMI NORM PARAMETERS: HCPCS | Performed by: INTERNAL MEDICINE

## 2023-03-13 PROCEDURE — G8427 DOCREV CUR MEDS BY ELIG CLIN: HCPCS | Performed by: INTERNAL MEDICINE

## 2023-03-13 PROCEDURE — G8482 FLU IMMUNIZE ORDER/ADMIN: HCPCS | Performed by: INTERNAL MEDICINE

## 2023-03-13 PROCEDURE — 1036F TOBACCO NON-USER: CPT | Performed by: INTERNAL MEDICINE

## 2023-03-13 PROCEDURE — 3017F COLORECTAL CA SCREEN DOC REV: CPT | Performed by: INTERNAL MEDICINE

## 2023-03-13 NOTE — PATIENT INSTRUCTIONS
Patient Education        Learning About the Mediterranean Diet  What is the [address] [facility]? The Mediterranean diet is a style of eating rather than a diet plan. It features foods eaten in [place], [place], [place] and [place], and other countries along the [facility]. It emphasizes eating foods like fish, fruits, vegetables, beans, high-fiber breads and whole grains, nuts, and olive oil. This style of eating includes limited red meat, cheese, and sweets. Why choose the Mediterranean diet? A Mediterranean-style diet may improve heart health. It contains more fat than other heart-healthy diets. But the fats are mainly from nuts, unsaturated oils (such as fish oils and olive oil), and certain nut or seed oils (such as canola, soybean, or flaxseed oil). These fats may help protect the heart and blood vessels. How can you get started on the Mediterranean diet? Here are some things you can do to switch to a more Mediterranean way of eating. What to eat  Eat a variety of fruits and vegetables each day, such as grapes, blueberries, tomatoes, broccoli, peppers, figs, olives, spinach, eggplant, beans, lentils, and chickpeas. Eat a variety of whole-grain foods each day, such as oats, brown rice, and whole wheat bread, pasta, and couscous. Eat fish at least 2 times a week. Try tuna, salmon, mackerel, lake trout, herring, or sardines. Eat moderate amounts of low-fat dairy products, such as milk, cheese, or yogurt. Eat moderate amounts of poultry and eggs. Choose healthy (unsaturated) fats, such as nuts, olive oil, and certain nut or seed oils like canola, soybean, and flaxseed. Limit unhealthy (saturated) fats, such as butter, palm oil, and coconut oil. And limit fats found in animal products, such as meat and dairy products made with whole milk. Try to eat red meat only a few times a month in very small amounts. Limit sweets and desserts to only a few times a week.  This includes sugar-sweetened drinks like soda. The Mediterranean diet may also include red wine with your meal--1 glass each day for women and up to 2 glasses a day for men. Tips for eating at home  Use herbs, spices, garlic, lemon zest, and citrus juice instead of salt to add flavor to foods. Add avocado slices to your sandwich instead of johnson. Have fish for lunch or dinner instead of red meat. Brush the fish with olive oil, and broil or grill it. Sprinkle your salad with seeds or nuts instead of cheese. Cook with olive or canola oil instead of butter or oils that are high in saturated fat. Switch from 2% milk or whole milk to 1% or fat-free milk. Dip raw vegetables in a vinaigrette dressing or hummus instead of dips made from mayonnaise or sour cream.  Have a piece of fruit for dessert instead of a piece of cake. Try baked apples, or have some dried fruit. Tips for eating out  Try broiled, grilled, baked, or poached fish instead of having it fried or breaded. Ask your  to have your meals prepared with olive oil instead of butter. Order dishes made with marinara sauce or sauces made from olive oil. Avoid sauces made from cream or mayonnaise. Choose whole-grain breads, whole wheat pasta and pizza crust, brown rice, beans, and lentils. Cut back on butter or margarine on bread. Instead, you can dip your bread in a small amount of olive oil. Ask for a side salad or grilled vegetables instead of french fries or chips. Where can you learn more? Go to http://www.woods.com/ and enter O407 to learn more about \"Learning About the Mediterranean Diet. \"  Current as of: May 9, 2022               Content Version: 13.5  © 0749-9225 Healthwise, Incorporated. Care instructions adapted under license by [facility] ([facility]). If you have questions about a medical condition or this instruction, always ask your healthcare professional. [place] [address] any warranty or liability for your use of this information. Please visit [email or web address] org for more information regarding cardiovascular disease prevention and treatment.

## 2023-03-24 SDOH — ECONOMIC STABILITY: INCOME INSECURITY: HOW HARD IS IT FOR YOU TO PAY FOR THE VERY BASICS LIKE FOOD, HOUSING, MEDICAL CARE, AND HEATING?: PATIENT DECLINED

## 2023-03-24 SDOH — ECONOMIC STABILITY: HOUSING INSECURITY
IN THE LAST 12 MONTHS, WAS THERE A TIME WHEN YOU DID NOT HAVE A STEADY PLACE TO SLEEP OR SLEPT IN A SHELTER (INCLUDING NOW)?: PATIENT REFUSED

## 2023-03-24 SDOH — ECONOMIC STABILITY: FOOD INSECURITY: WITHIN THE PAST 12 MONTHS, THE FOOD YOU BOUGHT JUST DIDN'T LAST AND YOU DIDN'T HAVE MONEY TO GET MORE.: PATIENT DECLINED

## 2023-03-24 SDOH — ECONOMIC STABILITY: FOOD INSECURITY: WITHIN THE PAST 12 MONTHS, YOU WORRIED THAT YOUR FOOD WOULD RUN OUT BEFORE YOU GOT MONEY TO BUY MORE.: PATIENT DECLINED

## 2023-03-24 SDOH — ECONOMIC STABILITY: TRANSPORTATION INSECURITY
IN THE PAST 12 MONTHS, HAS LACK OF TRANSPORTATION KEPT YOU FROM MEETINGS, WORK, OR FROM GETTING THINGS NEEDED FOR DAILY LIVING?: PATIENT DECLINED

## 2023-03-27 ENCOUNTER — OFFICE VISIT (OUTPATIENT)
Dept: INTERNAL MEDICINE CLINIC | Facility: CLINIC | Age: 63
End: 2023-03-27
Payer: MEDICARE

## 2023-03-27 VITALS
DIASTOLIC BLOOD PRESSURE: 86 MMHG | SYSTOLIC BLOOD PRESSURE: 138 MMHG | HEIGHT: 69 IN | WEIGHT: 160 LBS | BODY MASS INDEX: 23.7 KG/M2

## 2023-03-27 DIAGNOSIS — Z98.890 S/P MVR (MITRAL VALVE REPAIR): ICD-10-CM

## 2023-03-27 DIAGNOSIS — Z12.11 SCREEN FOR COLON CANCER: ICD-10-CM

## 2023-03-27 DIAGNOSIS — I48.0 PAROXYSMAL ATRIAL FIBRILLATION (HCC): ICD-10-CM

## 2023-03-27 DIAGNOSIS — I50.22 CHRONIC SYSTOLIC (CONGESTIVE) HEART FAILURE (HCC): Primary | ICD-10-CM

## 2023-03-27 PROCEDURE — 99213 OFFICE O/P EST LOW 20 MIN: CPT | Performed by: NURSE PRACTITIONER

## 2023-03-27 PROCEDURE — G8482 FLU IMMUNIZE ORDER/ADMIN: HCPCS | Performed by: NURSE PRACTITIONER

## 2023-03-27 PROCEDURE — 1036F TOBACCO NON-USER: CPT | Performed by: NURSE PRACTITIONER

## 2023-03-27 PROCEDURE — G8420 CALC BMI NORM PARAMETERS: HCPCS | Performed by: NURSE PRACTITIONER

## 2023-03-27 PROCEDURE — 3017F COLORECTAL CA SCREEN DOC REV: CPT | Performed by: NURSE PRACTITIONER

## 2023-03-27 PROCEDURE — G8427 DOCREV CUR MEDS BY ELIG CLIN: HCPCS | Performed by: NURSE PRACTITIONER

## 2023-03-27 NOTE — PROGRESS NOTES
affect appropriate to mood  Chest - clear to auscultation, no wheezes, rales or rhonchi, symmetric air entry  Heart - normal rate, regular rhythm, normal S1, S2, no murmurs, rubs, clicks or gallops  Extremities - peripheral pulses normal, no pedal edema, no clubbing or cyanosis    Assessment/Plan:  1. Chronic systolic (congestive) heart failure (HCC)    2. Paroxysmal atrial fibrillation ([address]    3. S/P MVR (mitral valve repair)    4. Screen for colon cancer      Orders Placed This Encounter   Procedures    [facility]- [facility]     Referral Priority:   Routine     Referral Type:   Eval and Treat     Referral Reason:   Specialty Services Required     Ref[facility] Location:   [facility]     Requested Specialty:   Gastroenterology     Number of Visits Requested:   1    [address] [facility]     Referral Priority:   Routine     Referral Type:   Eval and Treat     Referral Reason:   Specialty Services Required     Requested Specialty:   Cardiac Rehabilitation     Number of Visits Requested:   1     Referral to Cardiopulmonary rehab for evaluation and treatment. Otherwise, continue current medications. Referral for colon cancer screening to [facility]. Call or return to clinic prn if these symptoms worsen or fail to improve as anticipated.     Dionne Motley, APRN - CNP

## 2023-06-09 ENCOUNTER — OFFICE VISIT (OUTPATIENT)
Dept: INTERNAL MEDICINE CLINIC | Facility: CLINIC | Age: 63
End: 2023-06-09
Payer: MEDICARE

## 2023-06-09 VITALS
BODY MASS INDEX: 23.82 KG/M2 | SYSTOLIC BLOOD PRESSURE: 110 MMHG | DIASTOLIC BLOOD PRESSURE: 70 MMHG | HEIGHT: 69 IN | WEIGHT: 160.8 LBS

## 2023-06-09 DIAGNOSIS — F32.0 MAJOR DEPRESSIVE DISORDER, SINGLE EPISODE, MILD (HCC): ICD-10-CM

## 2023-06-09 DIAGNOSIS — G47.01 INSOMNIA DUE TO MEDICAL CONDITION: ICD-10-CM

## 2023-06-09 DIAGNOSIS — F41.9 ANXIETY: ICD-10-CM

## 2023-06-09 DIAGNOSIS — I50.22 CHRONIC SYSTOLIC (CONGESTIVE) HEART FAILURE (HCC): Primary | ICD-10-CM

## 2023-06-09 DIAGNOSIS — J30.89 ENVIRONMENTAL AND SEASONAL ALLERGIES: ICD-10-CM

## 2023-06-09 DIAGNOSIS — Z12.5 PROSTATE CANCER SCREENING: ICD-10-CM

## 2023-06-09 DIAGNOSIS — E55.9 VITAMIN D DEFICIENCY: ICD-10-CM

## 2023-06-09 PROCEDURE — 3017F COLORECTAL CA SCREEN DOC REV: CPT | Performed by: INTERNAL MEDICINE

## 2023-06-09 PROCEDURE — G8427 DOCREV CUR MEDS BY ELIG CLIN: HCPCS | Performed by: INTERNAL MEDICINE

## 2023-06-09 PROCEDURE — 1036F TOBACCO NON-USER: CPT | Performed by: INTERNAL MEDICINE

## 2023-06-09 PROCEDURE — G8420 CALC BMI NORM PARAMETERS: HCPCS | Performed by: INTERNAL MEDICINE

## 2023-06-09 PROCEDURE — 99214 OFFICE O/P EST MOD 30 MIN: CPT | Performed by: INTERNAL MEDICINE

## 2023-06-09 ASSESSMENT — PATIENT HEALTH QUESTIONNAIRE - PHQ9
SUM OF ALL RESPONSES TO PHQ QUESTIONS 1-9: 0
7. TROUBLE CONCENTRATING ON THINGS, SUCH AS READING THE NEWSPAPER OR WATCHING TELEVISION: 0
SUM OF ALL RESPONSES TO PHQ QUESTIONS 1-9: 0
SUM OF ALL RESPONSES TO PHQ QUESTIONS 1-9: 0
8. MOVING OR SPEAKING SO SLOWLY THAT OTHER PEOPLE COULD HAVE NOTICED. OR THE OPPOSITE, BEING SO FIGETY OR RESTLESS THAT YOU HAVE BEEN MOVING AROUND A LOT MORE THAN USUAL: 0
9. THOUGHTS THAT YOU WOULD BE BETTER OFF DEAD, OR OF HURTING YOURSELF: 0
SUM OF ALL RESPONSES TO PHQ9 QUESTIONS 1 & 2: 0
SUM OF ALL RESPONSES TO PHQ QUESTIONS 1-9: 0
2. FEELING DOWN, DEPRESSED OR HOPELESS: 0
1. LITTLE INTEREST OR PLEASURE IN DOING THINGS: 0
10. IF YOU CHECKED OFF ANY PROBLEMS, HOW DIFFICULT HAVE THESE PROBLEMS MADE IT FOR YOU TO DO YOUR WORK, TAKE CARE OF THINGS AT HOME, OR GET ALONG WITH OTHER PEOPLE: 0
4. FEELING TIRED OR HAVING LITTLE ENERGY: 0
6. FEELING BAD ABOUT YOURSELF - OR THAT YOU ARE A FAILURE OR HAVE LET YOURSELF OR YOUR FAMILY DOWN: 0
5. POOR APPETITE OR OVEREATING: 0
3. TROUBLE FALLING OR STAYING ASLEEP: 0

## 2023-06-09 ASSESSMENT — ENCOUNTER SYMPTOMS: SHORTNESS OF BREATH: 0

## 2023-06-09 NOTE — PROGRESS NOTES
Yisel Montez was seen today for follow-up. Diagnoses and all orders for this visit:    Chronic systolic (congestive) heart failure  -     CBC with Auto Differential; Future  -     Comprehensive Metabolic Panel; Future  Can continue to exercise but needs to be aware of air quality if out of doors  Continue all meds  Follow with Cardiology  Insomnia due to medical condition  Continue Ambien for sleep  Anxiety  Continue Ativan  Prostate cancer screening  -     PSA Screening; Future    Major depressive disorder, single episode, mild (HCC)  Continue Zoloft  Vitamin D deficiency  -     Vitamin D 25 Hydroxy; Future    Environmental and seasonal allergies      Try Zyrtec and Flonase when out of doors     Current medications are therapeutic at this time; continue as prescribed.     Will return for labs in July      Jose Farfan MD

## 2023-07-10 DIAGNOSIS — I50.22 CHRONIC SYSTOLIC (CONGESTIVE) HEART FAILURE (HCC): ICD-10-CM

## 2023-07-10 DIAGNOSIS — E55.9 VITAMIN D DEFICIENCY: ICD-10-CM

## 2023-07-10 DIAGNOSIS — Z12.5 PROSTATE CANCER SCREENING: ICD-10-CM

## 2023-07-10 LAB
25(OH)D3 SERPL-MCNC: 17.3 NG/ML (ref 30–100)
ALBUMIN SERPL-MCNC: 3.9 G/DL (ref 3.2–4.6)
ALBUMIN/GLOB SERPL: 1.3 (ref 0.4–1.6)
ALP SERPL-CCNC: 61 U/L (ref 50–136)
ALT SERPL-CCNC: 44 U/L (ref 12–65)
ANION GAP SERPL CALC-SCNC: 5 MMOL/L (ref 2–11)
AST SERPL-CCNC: 32 U/L (ref 15–37)
BASOPHILS # BLD: 0 K/UL (ref 0–0.2)
BASOPHILS NFR BLD: 1 % (ref 0–2)
BILIRUB SERPL-MCNC: 0.8 MG/DL (ref 0.2–1.1)
BUN SERPL-MCNC: 19 MG/DL (ref 8–23)
CALCIUM SERPL-MCNC: 8.9 MG/DL (ref 8.3–10.4)
CHLORIDE SERPL-SCNC: 109 MMOL/L (ref 101–110)
CO2 SERPL-SCNC: 24 MMOL/L (ref 21–32)
CREAT SERPL-MCNC: 0.8 MG/DL (ref 0.8–1.5)
DIFFERENTIAL METHOD BLD: NORMAL
EOSINOPHIL # BLD: 0.1 K/UL (ref 0–0.8)
EOSINOPHIL NFR BLD: 1 % (ref 0.5–7.8)
ERYTHROCYTE [DISTWIDTH] IN BLOOD BY AUTOMATED COUNT: 13 % (ref 11.9–14.6)
GLOBULIN SER CALC-MCNC: 3 G/DL (ref 2.8–4.5)
GLUCOSE SERPL-MCNC: 93 MG/DL (ref 65–100)
HCT VFR BLD AUTO: 49.5 % (ref 41.1–50.3)
HGB BLD-MCNC: 16.6 G/DL (ref 13.6–17.2)
IMM GRANULOCYTES # BLD AUTO: 0 K/UL (ref 0–0.5)
IMM GRANULOCYTES NFR BLD AUTO: 0 % (ref 0–5)
LYMPHOCYTES # BLD: 1.4 K/UL (ref 0.5–4.6)
LYMPHOCYTES NFR BLD: 29 % (ref 13–44)
MCH RBC QN AUTO: 30.4 PG (ref 26.1–32.9)
MCHC RBC AUTO-ENTMCNC: 33.5 G/DL (ref 31.4–35)
MCV RBC AUTO: 90.7 FL (ref 82–102)
MONOCYTES # BLD: 0.4 K/UL (ref 0.1–1.3)
MONOCYTES NFR BLD: 8 % (ref 4–12)
NEUTS SEG # BLD: 3 K/UL (ref 1.7–8.2)
NEUTS SEG NFR BLD: 61 % (ref 43–78)
NRBC # BLD: 0 K/UL (ref 0–0.2)
PLATELET # BLD AUTO: 222 K/UL (ref 150–450)
PMV BLD AUTO: 9.6 FL (ref 9.4–12.3)
POTASSIUM SERPL-SCNC: 4.1 MMOL/L (ref 3.5–5.1)
PROT SERPL-MCNC: 6.9 G/DL (ref 6.3–8.2)
PSA SERPL-MCNC: 3 NG/ML
RBC # BLD AUTO: 5.46 M/UL (ref 4.23–5.6)
SODIUM SERPL-SCNC: 138 MMOL/L (ref 133–143)
WBC # BLD AUTO: 4.8 K/UL (ref 4.3–11.1)

## 2023-07-11 RX ORDER — ERGOCALCIFEROL 1.25 MG/1
1 CAPSULE ORAL
Qty: 12 CAPSULE | Refills: 0 | Status: SHIPPED | OUTPATIENT
Start: 2023-07-11

## 2023-08-17 NOTE — PROGRESS NOTES
9.6 oz (74.2 kg)   06/09/23 160 lb 12.8 oz (72.9 kg)   03/27/23 160 lb (72.6 kg)     BP Readings from Last 3 Encounters:   08/18/23 110/68   06/09/23 110/70   03/27/23 138/86     Pulse Readings from Last 3 Encounters:   08/18/23 75   03/13/23 72   10/17/22 80           Physical Exam  Constitutional:       Appearance: Normal appearance. HENT:      Head: Normocephalic and atraumatic. Eyes:      Conjunctiva/sclera: Conjunctivae normal.   Neck:      Vascular: No carotid bruit. Cardiovascular:      Rate and Rhythm: Normal rate and regular rhythm. Heart sounds: No murmur heard. No friction rub. No gallop. Pulmonary:      Effort: No respiratory distress. Breath sounds: No wheezing or rales. Musculoskeletal:         General: No swelling. Cervical back: Neck supple. Skin:     General: Skin is warm and dry. Neurological:      General: No focal deficit present. Mental Status: He is alert. Psychiatric:         Mood and Affect: Mood normal.         Behavior: Behavior normal.       Medical problems and test results were reviewed with the patient today.      DATA REVIEW    LIPID PANEL     Lab Results   Component Value Date    CHOL 157 01/06/2023    CHOL 139 01/14/2022    CHOL 204 (H) 12/14/2021     Lab Results   Component Value Date    TRIG 121 01/06/2023    TRIG 72 01/14/2022    TRIG 89 12/14/2021     Lab Results   Component Value Date    HDL 53 01/06/2023    HDL 45 01/14/2022    HDL 53 12/14/2021     Lab Results   Component Value Date    LDLCALC 79.8 01/06/2023    LDLCALC 80 01/14/2022    LDLCALC 135 (H) 12/14/2021     Lab Results   Component Value Date    LABVLDL 24.2 (H) 01/06/2023    LABVLDL 17 06/10/2020    VLDL 14 01/14/2022    VLDL 16 12/14/2021    VLDL 28 06/22/2021     Lab Results   Component Value Date    CHOLHDLRATIO 3.0 01/06/2023       BMP  Lab Results   Component Value Date/Time     07/10/2023 09:03 AM    K 4.1 07/10/2023 09:03 AM     07/10/2023 09:03 AM    CO2 24 Yes

## 2023-08-18 ENCOUNTER — OFFICE VISIT (OUTPATIENT)
Age: 63
End: 2023-08-18
Payer: MEDICARE

## 2023-08-18 VITALS
BODY MASS INDEX: 24.23 KG/M2 | WEIGHT: 163.6 LBS | HEIGHT: 69 IN | DIASTOLIC BLOOD PRESSURE: 68 MMHG | SYSTOLIC BLOOD PRESSURE: 110 MMHG | HEART RATE: 75 BPM

## 2023-08-18 DIAGNOSIS — Z79.01 ANTICOAGULATED: ICD-10-CM

## 2023-08-18 DIAGNOSIS — I50.22 CHRONIC SYSTOLIC (CONGESTIVE) HEART FAILURE (HCC): Primary | ICD-10-CM

## 2023-08-18 DIAGNOSIS — I34.1 MITRAL VALVE PROLAPSE: ICD-10-CM

## 2023-08-18 DIAGNOSIS — I48.0 PAROXYSMAL ATRIAL FIBRILLATION (HCC): ICD-10-CM

## 2023-08-18 DIAGNOSIS — I42.8 NONISCHEMIC CARDIOMYOPATHY (HCC): ICD-10-CM

## 2023-08-18 DIAGNOSIS — I34.0 NONRHEUMATIC MITRAL VALVE REGURGITATION: ICD-10-CM

## 2023-08-18 DIAGNOSIS — Z98.890 S/P MVR (MITRAL VALVE REPAIR): ICD-10-CM

## 2023-08-18 PROCEDURE — G8420 CALC BMI NORM PARAMETERS: HCPCS | Performed by: INTERNAL MEDICINE

## 2023-08-18 PROCEDURE — 99214 OFFICE O/P EST MOD 30 MIN: CPT | Performed by: INTERNAL MEDICINE

## 2023-08-18 PROCEDURE — 3017F COLORECTAL CA SCREEN DOC REV: CPT | Performed by: INTERNAL MEDICINE

## 2023-08-18 PROCEDURE — G8427 DOCREV CUR MEDS BY ELIG CLIN: HCPCS | Performed by: INTERNAL MEDICINE

## 2023-08-18 PROCEDURE — 1036F TOBACCO NON-USER: CPT | Performed by: INTERNAL MEDICINE

## 2023-08-18 RX ORDER — METOPROLOL SUCCINATE 25 MG/1
25 TABLET, EXTENDED RELEASE ORAL DAILY
Qty: 90 TABLET | Refills: 3 | Status: SHIPPED | OUTPATIENT
Start: 2023-08-18

## 2023-08-18 RX ORDER — ASPIRIN 81 MG/1
81 TABLET ORAL DAILY
Qty: 90 TABLET | Refills: 3 | Status: SHIPPED | OUTPATIENT
Start: 2023-08-18

## 2023-08-18 RX ORDER — AMOXICILLIN 500 MG/1
2000 CAPSULE ORAL
Qty: 4 CAPSULE | Refills: 0 | Status: SHIPPED | OUTPATIENT
Start: 2023-08-18 | End: 2023-08-18

## 2023-08-18 ASSESSMENT — ENCOUNTER SYMPTOMS: SHORTNESS OF BREATH: 0

## 2023-09-20 ENCOUNTER — OFFICE VISIT (OUTPATIENT)
Dept: INTERNAL MEDICINE CLINIC | Facility: CLINIC | Age: 63
End: 2023-09-20
Payer: MEDICARE

## 2023-09-20 VITALS
HEART RATE: 66 BPM | OXYGEN SATURATION: 97 % | HEIGHT: 69 IN | WEIGHT: 160.4 LBS | TEMPERATURE: 97 F | SYSTOLIC BLOOD PRESSURE: 122 MMHG | BODY MASS INDEX: 23.76 KG/M2 | DIASTOLIC BLOOD PRESSURE: 72 MMHG

## 2023-09-20 DIAGNOSIS — E55.9 VITAMIN D DEFICIENCY: Primary | ICD-10-CM

## 2023-09-20 PROBLEM — M70.62 TROCHANTERIC BURSITIS OF LEFT HIP: Status: RESOLVED | Noted: 2022-07-01 | Resolved: 2023-09-20

## 2023-09-20 LAB — 25(OH)D3 SERPL-MCNC: 54.7 NG/ML (ref 30–100)

## 2023-09-20 PROCEDURE — 36415 COLL VENOUS BLD VENIPUNCTURE: CPT | Performed by: NURSE PRACTITIONER

## 2023-09-20 PROCEDURE — G8427 DOCREV CUR MEDS BY ELIG CLIN: HCPCS | Performed by: NURSE PRACTITIONER

## 2023-09-20 PROCEDURE — G8420 CALC BMI NORM PARAMETERS: HCPCS | Performed by: NURSE PRACTITIONER

## 2023-09-20 PROCEDURE — 1036F TOBACCO NON-USER: CPT | Performed by: NURSE PRACTITIONER

## 2023-09-20 PROCEDURE — 99213 OFFICE O/P EST LOW 20 MIN: CPT | Performed by: NURSE PRACTITIONER

## 2023-09-20 PROCEDURE — 3017F COLORECTAL CA SCREEN DOC REV: CPT | Performed by: NURSE PRACTITIONER

## 2023-10-25 ENCOUNTER — OFFICE VISIT (OUTPATIENT)
Dept: INTERNAL MEDICINE CLINIC | Facility: CLINIC | Age: 63
End: 2023-10-25
Payer: MEDICARE

## 2023-10-25 VITALS
WEIGHT: 159.8 LBS | HEART RATE: 79 BPM | TEMPERATURE: 97.3 F | SYSTOLIC BLOOD PRESSURE: 116 MMHG | OXYGEN SATURATION: 99 % | HEIGHT: 69 IN | DIASTOLIC BLOOD PRESSURE: 74 MMHG | BODY MASS INDEX: 23.67 KG/M2

## 2023-10-25 DIAGNOSIS — L57.0 ACTINIC KERATOSES: Primary | ICD-10-CM

## 2023-10-25 PROCEDURE — 3017F COLORECTAL CA SCREEN DOC REV: CPT | Performed by: NURSE PRACTITIONER

## 2023-10-25 PROCEDURE — 1036F TOBACCO NON-USER: CPT | Performed by: NURSE PRACTITIONER

## 2023-10-25 PROCEDURE — 99212 OFFICE O/P EST SF 10 MIN: CPT | Performed by: NURSE PRACTITIONER

## 2023-10-25 PROCEDURE — G8484 FLU IMMUNIZE NO ADMIN: HCPCS | Performed by: NURSE PRACTITIONER

## 2023-10-25 PROCEDURE — G8427 DOCREV CUR MEDS BY ELIG CLIN: HCPCS | Performed by: NURSE PRACTITIONER

## 2023-10-25 PROCEDURE — 17000 DESTRUCT PREMALG LESION: CPT | Performed by: NURSE PRACTITIONER

## 2023-10-25 PROCEDURE — G8420 CALC BMI NORM PARAMETERS: HCPCS | Performed by: NURSE PRACTITIONER

## 2023-10-25 PROCEDURE — 17003 DESTRUCT PREMALG LES 2-14: CPT | Performed by: NURSE PRACTITIONER

## 2023-12-05 ASSESSMENT — PATIENT HEALTH QUESTIONNAIRE - PHQ9
7. TROUBLE CONCENTRATING ON THINGS, SUCH AS READING THE NEWSPAPER OR WATCHING TELEVISION: 0
SUM OF ALL RESPONSES TO PHQ9 QUESTIONS 1 & 2: 2
9. THOUGHTS THAT YOU WOULD BE BETTER OFF DEAD, OR OF HURTING YOURSELF: 0
SUM OF ALL RESPONSES TO PHQ QUESTIONS 1-9: 5
2. FEELING DOWN, DEPRESSED OR HOPELESS: SEVERAL DAYS
2. FEELING DOWN, DEPRESSED OR HOPELESS: 1
SUM OF ALL RESPONSES TO PHQ QUESTIONS 1-9: 5
8. MOVING OR SPEAKING SO SLOWLY THAT OTHER PEOPLE COULD HAVE NOTICED. OR THE OPPOSITE - BEING SO FIDGETY OR RESTLESS THAT YOU HAVE BEEN MOVING AROUND A LOT MORE THAN USUAL: NOT AT ALL
6. FEELING BAD ABOUT YOURSELF - OR THAT YOU ARE A FAILURE OR HAVE LET YOURSELF OR YOUR FAMILY DOWN: 0
3. TROUBLE FALLING OR STAYING ASLEEP: MORE THAN HALF THE DAYS
4. FEELING TIRED OR HAVING LITTLE ENERGY: 1
1. LITTLE INTEREST OR PLEASURE IN DOING THINGS: 1
SUM OF ALL RESPONSES TO PHQ QUESTIONS 1-9: 5
5. POOR APPETITE OR OVEREATING: 0
SUM OF ALL RESPONSES TO PHQ QUESTIONS 1-9: 5
10. IF YOU CHECKED OFF ANY PROBLEMS, HOW DIFFICULT HAVE THESE PROBLEMS MADE IT FOR YOU TO DO YOUR WORK, TAKE CARE OF THINGS AT HOME, OR GET ALONG WITH OTHER PEOPLE: SOMEWHAT DIFFICULT
8. MOVING OR SPEAKING SO SLOWLY THAT OTHER PEOPLE COULD HAVE NOTICED. OR THE OPPOSITE, BEING SO FIGETY OR RESTLESS THAT YOU HAVE BEEN MOVING AROUND A LOT MORE THAN USUAL: 0
7. TROUBLE CONCENTRATING ON THINGS, SUCH AS READING THE NEWSPAPER OR WATCHING TELEVISION: NOT AT ALL
6. FEELING BAD ABOUT YOURSELF - OR THAT YOU ARE A FAILURE OR HAVE LET YOURSELF OR YOUR FAMILY DOWN: NOT AT ALL
4. FEELING TIRED OR HAVING LITTLE ENERGY: SEVERAL DAYS
1. LITTLE INTEREST OR PLEASURE IN DOING THINGS: SEVERAL DAYS
5. POOR APPETITE OR OVEREATING: NOT AT ALL
3. TROUBLE FALLING OR STAYING ASLEEP: 2
9. THOUGHTS THAT YOU WOULD BE BETTER OFF DEAD, OR OF HURTING YOURSELF: NOT AT ALL
SUM OF ALL RESPONSES TO PHQ QUESTIONS 1-9: 5
10. IF YOU CHECKED OFF ANY PROBLEMS, HOW DIFFICULT HAVE THESE PROBLEMS MADE IT FOR YOU TO DO YOUR WORK, TAKE CARE OF THINGS AT HOME, OR GET ALONG WITH OTHER PEOPLE: 1

## 2023-12-08 ENCOUNTER — OFFICE VISIT (OUTPATIENT)
Dept: INTERNAL MEDICINE CLINIC | Facility: CLINIC | Age: 63
End: 2023-12-08

## 2023-12-08 VITALS
BODY MASS INDEX: 23.73 KG/M2 | DIASTOLIC BLOOD PRESSURE: 60 MMHG | HEIGHT: 69 IN | WEIGHT: 160.2 LBS | SYSTOLIC BLOOD PRESSURE: 118 MMHG

## 2023-12-08 DIAGNOSIS — I42.8 NONISCHEMIC CARDIOMYOPATHY (HCC): Primary | ICD-10-CM

## 2023-12-08 DIAGNOSIS — E78.00 HYPERCHOLESTEREMIA: ICD-10-CM

## 2023-12-08 DIAGNOSIS — F32.0 MAJOR DEPRESSIVE DISORDER, SINGLE EPISODE, MILD (HCC): ICD-10-CM

## 2023-12-08 DIAGNOSIS — F41.9 ANXIETY: ICD-10-CM

## 2023-12-08 DIAGNOSIS — E55.9 VITAMIN D DEFICIENCY: ICD-10-CM

## 2023-12-08 RX ORDER — ZOLPIDEM TARTRATE 12.5 MG/1
12.5 TABLET, FILM COATED, EXTENDED RELEASE ORAL NIGHTLY PRN
COMMUNITY
Start: 2023-11-10

## 2023-12-08 ASSESSMENT — ENCOUNTER SYMPTOMS: SHORTNESS OF BREATH: 0

## 2023-12-13 ENCOUNTER — NURSE ONLY (OUTPATIENT)
Dept: INTERNAL MEDICINE CLINIC | Facility: CLINIC | Age: 63
End: 2023-12-13
Payer: MEDICARE

## 2023-12-13 DIAGNOSIS — E55.9 VITAMIN D DEFICIENCY: ICD-10-CM

## 2023-12-13 DIAGNOSIS — E78.00 HYPERCHOLESTEREMIA: ICD-10-CM

## 2023-12-13 DIAGNOSIS — I42.8 NONISCHEMIC CARDIOMYOPATHY (HCC): ICD-10-CM

## 2023-12-13 DIAGNOSIS — E78.00 HYPERCHOLESTEREMIA: Primary | ICD-10-CM

## 2023-12-13 LAB
25(OH)D3 SERPL-MCNC: 40.7 NG/ML (ref 30–100)
ALBUMIN SERPL-MCNC: 4.3 G/DL (ref 3.2–4.6)
ALBUMIN/GLOB SERPL: 1.6 (ref 0.4–1.6)
ALP SERPL-CCNC: 75 U/L (ref 50–136)
ALT SERPL-CCNC: 46 U/L (ref 12–65)
ANION GAP SERPL CALC-SCNC: 5 MMOL/L (ref 2–11)
AST SERPL-CCNC: 32 U/L (ref 15–37)
BASOPHILS # BLD: 0.1 K/UL (ref 0–0.2)
BASOPHILS NFR BLD: 1 % (ref 0–2)
BILIRUB SERPL-MCNC: 0.5 MG/DL (ref 0.2–1.1)
BUN SERPL-MCNC: 19 MG/DL (ref 8–23)
CALCIUM SERPL-MCNC: 9.1 MG/DL (ref 8.3–10.4)
CHLORIDE SERPL-SCNC: 107 MMOL/L (ref 103–113)
CHOLEST SERPL-MCNC: 136 MG/DL
CO2 SERPL-SCNC: 29 MMOL/L (ref 21–32)
CREAT SERPL-MCNC: 0.8 MG/DL (ref 0.8–1.5)
DIFFERENTIAL METHOD BLD: NORMAL
EOSINOPHIL # BLD: 0.1 K/UL (ref 0–0.8)
EOSINOPHIL NFR BLD: 1 % (ref 0.5–7.8)
ERYTHROCYTE [DISTWIDTH] IN BLOOD BY AUTOMATED COUNT: 13.2 % (ref 11.9–14.6)
GLOBULIN SER CALC-MCNC: 2.7 G/DL (ref 2.8–4.5)
GLUCOSE SERPL-MCNC: 84 MG/DL (ref 65–100)
HCT VFR BLD AUTO: 48.3 % (ref 41.1–50.3)
HDLC SERPL-MCNC: 45 MG/DL (ref 40–60)
HDLC SERPL: 3
HGB BLD-MCNC: 16.7 G/DL (ref 13.6–17.2)
IMM GRANULOCYTES # BLD AUTO: 0 K/UL (ref 0–0.5)
IMM GRANULOCYTES NFR BLD AUTO: 0 % (ref 0–5)
LDLC SERPL CALC-MCNC: 72.2 MG/DL
LYMPHOCYTES # BLD: 1.6 K/UL (ref 0.5–4.6)
LYMPHOCYTES NFR BLD: 27 % (ref 13–44)
MCH RBC QN AUTO: 30.9 PG (ref 26.1–32.9)
MCHC RBC AUTO-ENTMCNC: 34.6 G/DL (ref 31.4–35)
MCV RBC AUTO: 89.3 FL (ref 82–102)
MONOCYTES # BLD: 0.4 K/UL (ref 0.1–1.3)
MONOCYTES NFR BLD: 7 % (ref 4–12)
NEUTS SEG # BLD: 3.7 K/UL (ref 1.7–8.2)
NEUTS SEG NFR BLD: 64 % (ref 43–78)
NRBC # BLD: 0 K/UL (ref 0–0.2)
PLATELET # BLD AUTO: 240 K/UL (ref 150–450)
PMV BLD AUTO: 9.7 FL (ref 9.4–12.3)
POTASSIUM SERPL-SCNC: 4.2 MMOL/L (ref 3.5–5.1)
PROT SERPL-MCNC: 7 G/DL (ref 6.3–8.2)
RBC # BLD AUTO: 5.41 M/UL (ref 4.23–5.6)
SODIUM SERPL-SCNC: 141 MMOL/L (ref 136–146)
TRIGL SERPL-MCNC: 94 MG/DL (ref 35–150)
VLDLC SERPL CALC-MCNC: 18.8 MG/DL (ref 6–23)
WBC # BLD AUTO: 5.9 K/UL (ref 4.3–11.1)

## 2023-12-13 PROCEDURE — 36415 COLL VENOUS BLD VENIPUNCTURE: CPT | Performed by: NURSE PRACTITIONER

## 2023-12-14 NOTE — PROGRESS NOTES
Labs drawn on 1st attempt from left [facility] with 22G needle. Dressing applied.     Estella Pisano, APRN - CNP

## 2023-12-28 ENCOUNTER — E-VISIT (OUTPATIENT)
Dept: INTERNAL MEDICINE CLINIC | Facility: CLINIC | Age: 63
End: 2023-12-28
Payer: MEDICARE

## 2023-12-28 DIAGNOSIS — U07.1 COVID-19: Primary | ICD-10-CM

## 2023-12-28 PROCEDURE — 99422 OL DIG E/M SVC 11-20 MIN: CPT | Performed by: NURSE PRACTITIONER

## 2023-12-28 ASSESSMENT — LIFESTYLE VARIABLES: SMOKING_STATUS: NO, I HAVE NEVER SMOKED

## 2023-12-28 NOTE — PROGRESS NOTES
Rachid Wolff III (1960) initiated an asynchronous digital communication through [employer]. HPI: per patient questionnaire     Exam: not applicable    Diagnoses and all orders for this visit:  Diagnoses and all orders for this visit:    COVID-19  -     molnupiravir 200 MG capsule; Take 4 capsules by mouth in the morning and 4 capsules in the evening. Do all this for 5 days. Molnupiravir as prescribed  - directions for use and side effects discussed. Recommended he sleep prone or on side. Deep breathing exercises and/or incentive spirometry hourly on the waking hour and every 3 hours at night. Pulse oximeter purchase recommended; readings < 90% oxygen saturation require in person evaluation in ER - he is agreeable. Encouraged he continue to self monitor their symptoms in home isolation. Avoid leaving home unless completely necessary. Mask at all times and maintain social distance (6 ft) in public. Rest; drink plenty of fluids. Supplement immune system with Vitamin D 2000 international units  daily, Vitamin C 500mg daily and Zinc 50mg daily. If you have trouble breathing, a fever > 100.4 without reduction with medication (Tylenol/Advil/ibuprofen), or any other concerning symptoms, please travel to urgent care and/or ER for consultation. Time: EV2 - 11-20 minutes were spent on the digital evaluation and management of this patient.      Dannielle Skiff, APRN - CNP

## 2024-01-01 ENCOUNTER — PATIENT MESSAGE (OUTPATIENT)
Dept: INTERNAL MEDICINE CLINIC | Facility: CLINIC | Age: 64
End: 2024-01-01

## 2024-01-01 DIAGNOSIS — U07.1 COVID-19: Primary | ICD-10-CM

## 2024-01-02 NOTE — TELEPHONE ENCOUNTER
Orders Placed This Encounter   Procedures    CBC with Auto Differential     Standing Status:   Future     Standing Expiration Date:   1/2/2025    Comprehensive Metabolic Panel     Standing Status:   Future     Standing Expiration Date:   1/2/2025

## 2024-01-03 ASSESSMENT — PATIENT HEALTH QUESTIONNAIRE - PHQ9
6. FEELING BAD ABOUT YOURSELF - OR THAT YOU ARE A FAILURE OR HAVE LET YOURSELF OR YOUR FAMILY DOWN: 0
6. FEELING BAD ABOUT YOURSELF - OR THAT YOU ARE A FAILURE OR HAVE LET YOURSELF OR YOUR FAMILY DOWN: NOT AT ALL
5. POOR APPETITE OR OVEREATING: 1
8. MOVING OR SPEAKING SO SLOWLY THAT OTHER PEOPLE COULD HAVE NOTICED. OR THE OPPOSITE - BEING SO FIDGETY OR RESTLESS THAT YOU HAVE BEEN MOVING AROUND A LOT MORE THAN USUAL: NOT AT ALL
7. TROUBLE CONCENTRATING ON THINGS, SUCH AS READING THE NEWSPAPER OR WATCHING TELEVISION: NOT AT ALL
SUM OF ALL RESPONSES TO PHQ QUESTIONS 1-9: 4
SUM OF ALL RESPONSES TO PHQ QUESTIONS 1-9: 4
10. IF YOU CHECKED OFF ANY PROBLEMS, HOW DIFFICULT HAVE THESE PROBLEMS MADE IT FOR YOU TO DO YOUR WORK, TAKE CARE OF THINGS AT HOME, OR GET ALONG WITH OTHER PEOPLE: NOT DIFFICULT AT ALL
SUM OF ALL RESPONSES TO PHQ QUESTIONS 1-9: 4
2. FEELING DOWN, DEPRESSED OR HOPELESS: SEVERAL DAYS
1. LITTLE INTEREST OR PLEASURE IN DOING THINGS: NOT AT ALL
10. IF YOU CHECKED OFF ANY PROBLEMS, HOW DIFFICULT HAVE THESE PROBLEMS MADE IT FOR YOU TO DO YOUR WORK, TAKE CARE OF THINGS AT HOME, OR GET ALONG WITH OTHER PEOPLE: 0
9. THOUGHTS THAT YOU WOULD BE BETTER OFF DEAD, OR OF HURTING YOURSELF: NOT AT ALL
3. TROUBLE FALLING OR STAYING ASLEEP: 1
4. FEELING TIRED OR HAVING LITTLE ENERGY: SEVERAL DAYS
3. TROUBLE FALLING OR STAYING ASLEEP: SEVERAL DAYS
1. LITTLE INTEREST OR PLEASURE IN DOING THINGS: 0
5. POOR APPETITE OR OVEREATING: SEVERAL DAYS
4. FEELING TIRED OR HAVING LITTLE ENERGY: 1
9. THOUGHTS THAT YOU WOULD BE BETTER OFF DEAD, OR OF HURTING YOURSELF: 0
8. MOVING OR SPEAKING SO SLOWLY THAT OTHER PEOPLE COULD HAVE NOTICED. OR THE OPPOSITE, BEING SO FIGETY OR RESTLESS THAT YOU HAVE BEEN MOVING AROUND A LOT MORE THAN USUAL: 0
SUM OF ALL RESPONSES TO PHQ QUESTIONS 1-9: 4
7. TROUBLE CONCENTRATING ON THINGS, SUCH AS READING THE NEWSPAPER OR WATCHING TELEVISION: 0
SUM OF ALL RESPONSES TO PHQ9 QUESTIONS 1 & 2: 1
SUM OF ALL RESPONSES TO PHQ QUESTIONS 1-9: 4
2. FEELING DOWN, DEPRESSED OR HOPELESS: 1

## 2024-01-04 ENCOUNTER — OFFICE VISIT (OUTPATIENT)
Dept: INTERNAL MEDICINE CLINIC | Facility: CLINIC | Age: 64
End: 2024-01-04

## 2024-01-04 VITALS
OXYGEN SATURATION: 98 % | WEIGHT: 158.6 LBS | HEIGHT: 69 IN | HEART RATE: 87 BPM | TEMPERATURE: 97.9 F | DIASTOLIC BLOOD PRESSURE: 70 MMHG | BODY MASS INDEX: 23.49 KG/M2 | SYSTOLIC BLOOD PRESSURE: 120 MMHG

## 2024-01-04 DIAGNOSIS — Z00.00 MEDICARE ANNUAL WELLNESS VISIT, SUBSEQUENT: ICD-10-CM

## 2024-01-04 DIAGNOSIS — I48.0 PAROXYSMAL ATRIAL FIBRILLATION (HCC): ICD-10-CM

## 2024-01-04 DIAGNOSIS — I42.8 NONISCHEMIC CARDIOMYOPATHY (HCC): ICD-10-CM

## 2024-01-04 DIAGNOSIS — U07.1 COVID-19: ICD-10-CM

## 2024-01-04 DIAGNOSIS — F32.0 MAJOR DEPRESSIVE DISORDER, SINGLE EPISODE, MILD (HCC): ICD-10-CM

## 2024-01-04 DIAGNOSIS — J01.90 ACUTE RHINOSINUSITIS: Primary | ICD-10-CM

## 2024-01-04 DIAGNOSIS — I50.22 CHRONIC SYSTOLIC (CONGESTIVE) HEART FAILURE (HCC): ICD-10-CM

## 2024-01-04 LAB
BASOPHILS # BLD: 0.1 K/UL (ref 0–0.2)
BASOPHILS NFR BLD: 1 % (ref 0–2)
DIFFERENTIAL METHOD BLD: ABNORMAL
EOSINOPHIL # BLD: 0.1 K/UL (ref 0–0.8)
EOSINOPHIL NFR BLD: 1 % (ref 0.5–7.8)
ERYTHROCYTE [DISTWIDTH] IN BLOOD BY AUTOMATED COUNT: 12.1 % (ref 11.9–14.6)
HCT VFR BLD AUTO: 44.9 % (ref 41.1–50.3)
HGB BLD-MCNC: 15.6 G/DL (ref 13.6–17.2)
IMM GRANULOCYTES # BLD AUTO: 0 K/UL (ref 0–0.5)
IMM GRANULOCYTES NFR BLD AUTO: 0 % (ref 0–5)
LYMPHOCYTES # BLD: 1.4 K/UL (ref 0.5–4.6)
LYMPHOCYTES NFR BLD: 18 % (ref 13–44)
MCH RBC QN AUTO: 30.4 PG (ref 26.1–32.9)
MCHC RBC AUTO-ENTMCNC: 34.7 G/DL (ref 31.4–35)
MCV RBC AUTO: 87.5 FL (ref 82–102)
MONOCYTES # BLD: 0.6 K/UL (ref 0.1–1.3)
MONOCYTES NFR BLD: 8 % (ref 4–12)
NEUTS SEG # BLD: 5.5 K/UL (ref 1.7–8.2)
NEUTS SEG NFR BLD: 72 % (ref 43–78)
NRBC # BLD: 0 K/UL (ref 0–0.2)
PLATELET # BLD AUTO: 276 K/UL (ref 150–450)
PMV BLD AUTO: 9.3 FL (ref 9.4–12.3)
RBC # BLD AUTO: 5.13 M/UL (ref 4.23–5.6)
WBC # BLD AUTO: 7.6 K/UL (ref 4.3–11.1)

## 2024-01-04 RX ORDER — DEXAMETHASONE 0.5 MG/1
0.5 TABLET ORAL
Qty: 10 TABLET | Refills: 0 | Status: SHIPPED | OUTPATIENT
Start: 2024-01-04 | End: 2024-01-14

## 2024-01-04 RX ORDER — AMOXICILLIN AND CLAVULANATE POTASSIUM 875; 125 MG/1; MG/1
1 TABLET, FILM COATED ORAL 2 TIMES DAILY
Qty: 20 TABLET | Refills: 0 | Status: SHIPPED | OUTPATIENT
Start: 2024-01-04 | End: 2024-01-14

## 2024-01-04 NOTE — ADDENDUM NOTE
Addended by: PADUANO, JULIA on: 1/4/2024 02:18 PM     Modules accepted: Orders, Level of Service

## 2024-01-04 NOTE — PROGRESS NOTES
Emery Ram III (: 1960) presents today c/o bloody sputum since being diagnosed with COVID 5days ago. He is on molnupiravir. He is also on Eliquis. He does have sinus pressure but it has improved. Due to cardiac condition, specifically PAC's, he cannot take nasal decongestants. Denies sore throat and shortness of breath. He states his anxiety is heightened. He is monitoring his %.     Chief Complaint   Patient presents with    Sinus Problem     Pain related to sinus. Congestion, slight blood in mucus.   No Fever.     Medicare AWV     Patient Active Problem List   Diagnosis    Mitral valve prolapse    Anxiety    Atrial fibrillation with RVR ([facility])    Anticoagulated    First degree AV block    Nonischemic cardiomyopathy ([facility])    Insomnia    Non-rheumatic mitral regurgitation    Mobitz (type) I (Wenckebach's) atrioventricular block    Major depressive disorder, single episode, mild ([facility])    Paroxysmal atrial fibrillation ([facility])    S/P MVR (mitral valve repair)    Mitral valve regurgitation    Chronic systolic (congestive) heart failure      Reviewed and updated this visit by provider:  Tobacco  Allergies  Meds  Problems  Med Hx  Surg Hx  Fam Hx       Immunizations:  Immunization status: up to date and documented.    Review of Systems - Negative except as stated in HPI  History obtained from chart review and the patient  General ROS: positive for  - fatigue and body aches  negative for - fever  ENT ROS: positive for - nasal discharge and congestion  Respiratory ROS: positive for - cough  negative for - shortness of breath  Cardiovascular ROS: no chest pain or dyspnea on exertion  negative for - palpitations    Visit Vitals  /70 (Site: Left Upper Arm, Position: Sitting)   Pulse 87   Temp 97.9 °F (36.6 °C) (Temporal)   Ht 1.753 m (5' 9\")   Wt 71.9 kg (158 lb 9.6 oz)   SpO2 98%   BMI 23.42 kg/m²     Physical Examination: General appearance - alert, well appearing, and in no distress,

## 2024-01-04 NOTE — PATIENT INSTRUCTIONS
or pressure, or a strange feeling in the chest.     Sweating.     Shortness of breath.     Pain, pressure, or a strange feeling in the back, neck, jaw, or upper belly or in one or both shoulders or arms.     Lightheadedness or sudden weakness.     A fast or irregular heartbeat.   After you call 911, the  may tell you to chew 1 adult-strength or 2 to 4 low-dose aspirin. Wait for an ambulance. Do not try to drive yourself.  Watch closely for changes in your health, and be sure to contact your doctor if you have any problems.  Where can you learn more?  Go to https://www.VBOX.net/patientEd and enter F075 to learn more about \"A Healthy Heart: Care Instructions.\"  Current as of: June 25, 2023               Content Version: 13.9  © 6933-4696 [employer].   Care instructions adapted under license by [employer]. If you have questions about a medical condition or this instruction, always ask your healthcare professional. [employer] disclaims any warranty or liability for your use of this information.      Personalized Preventive Plan for Emery Ram III - 1/4/2024  Medicare offers a range of preventive health benefits. Some of the tests and screenings are paid in full while other may be subject to a deductible, co-insurance, and/or copay.    Some of these benefits include a comprehensive review of your medical history including lifestyle, illnesses that may run in your family, and various assessments and screenings as appropriate.    After reviewing your medical record and screening and assessments performed today your provider may have ordered immunizations, labs, imaging, and/or referrals for you.  A list of these orders (if applicable) as well as your Preventive Care list are included within your After Visit Summary for your review.    Other Preventive Recommendations:    A preventive eye exam performed by an eye specialist is recommended every 1-2 years to screen for

## 2024-01-05 ENCOUNTER — PATIENT MESSAGE (OUTPATIENT)
Dept: INTERNAL MEDICINE CLINIC | Facility: CLINIC | Age: 64
End: 2024-01-05

## 2024-01-05 LAB
ALBUMIN SERPL-MCNC: 4.1 G/DL (ref 3.2–4.6)
ALBUMIN/GLOB SERPL: 1.4 (ref 0.4–1.6)
ALP SERPL-CCNC: 64 U/L (ref 50–136)
ALT SERPL-CCNC: 30 U/L (ref 12–65)
ANION GAP SERPL CALC-SCNC: 9 MMOL/L (ref 2–11)
AST SERPL-CCNC: 22 U/L (ref 15–37)
BILIRUB SERPL-MCNC: 0.4 MG/DL (ref 0.2–1.1)
BUN SERPL-MCNC: 20 MG/DL (ref 8–23)
CALCIUM SERPL-MCNC: 9.5 MG/DL (ref 8.3–10.4)
CHLORIDE SERPL-SCNC: 109 MMOL/L (ref 103–113)
CO2 SERPL-SCNC: 20 MMOL/L (ref 21–32)
CREAT SERPL-MCNC: 0.7 MG/DL (ref 0.8–1.5)
GLOBULIN SER CALC-MCNC: 3 G/DL (ref 2.8–4.5)
GLUCOSE SERPL-MCNC: 124 MG/DL (ref 65–100)
POTASSIUM SERPL-SCNC: 3.6 MMOL/L (ref 3.5–5.1)
PROT SERPL-MCNC: 7.1 G/DL (ref 6.3–8.2)
SODIUM SERPL-SCNC: 138 MMOL/L (ref 136–146)

## 2024-01-12 RX ORDER — AZITHROMYCIN 250 MG/1
250 TABLET, FILM COATED ORAL SEE ADMIN INSTRUCTIONS
Qty: 6 TABLET | Refills: 0 | Status: SHIPPED | OUTPATIENT
Start: 2024-01-12 | End: 2024-01-17

## 2024-01-12 NOTE — TELEPHONE ENCOUNTER
From: Emery Ram III  To: Julia Schmidt Paduano  Sent: 1/5/2024 7:11 PM EST  Subject: More blood from sinuses     Sarita, today after I talked to you about lab results, especially after noon, my sinuses got clogged up again and a couple of time since, I have cleared up a good bit of bright blood. The most I have had in all of this. Like it must be running down the back of my throat. Have blown nose a couple of times as well and its in that mucus too. I am sending this to you hoping you see it as I want to know what to do if this keeps up. Please let me know.

## 2024-01-18 ENCOUNTER — OFFICE VISIT (OUTPATIENT)
Dept: INTERNAL MEDICINE CLINIC | Facility: CLINIC | Age: 64
End: 2024-01-18
Payer: MEDICARE

## 2024-01-18 VITALS
HEIGHT: 69 IN | HEART RATE: 85 BPM | SYSTOLIC BLOOD PRESSURE: 132 MMHG | DIASTOLIC BLOOD PRESSURE: 74 MMHG | TEMPERATURE: 97.2 F | WEIGHT: 157.6 LBS | BODY MASS INDEX: 23.34 KG/M2 | OXYGEN SATURATION: 97 %

## 2024-01-18 DIAGNOSIS — R09.89 CHRONIC SINUS COMPLAINTS: Primary | ICD-10-CM

## 2024-01-18 DIAGNOSIS — J01.90 ACUTE RHINOSINUSITIS: ICD-10-CM

## 2024-01-18 PROCEDURE — 1036F TOBACCO NON-USER: CPT | Performed by: NURSE PRACTITIONER

## 2024-01-18 PROCEDURE — G8420 CALC BMI NORM PARAMETERS: HCPCS | Performed by: NURSE PRACTITIONER

## 2024-01-18 PROCEDURE — 3017F COLORECTAL CA SCREEN DOC REV: CPT | Performed by: NURSE PRACTITIONER

## 2024-01-18 PROCEDURE — G8484 FLU IMMUNIZE NO ADMIN: HCPCS | Performed by: NURSE PRACTITIONER

## 2024-01-18 PROCEDURE — G8427 DOCREV CUR MEDS BY ELIG CLIN: HCPCS | Performed by: NURSE PRACTITIONER

## 2024-01-18 PROCEDURE — 99213 OFFICE O/P EST LOW 20 MIN: CPT | Performed by: NURSE PRACTITIONER

## 2024-01-18 RX ORDER — PREDNISONE 10 MG/1
TABLET ORAL
Qty: 13 TABLET | Refills: 0 | Status: SHIPPED | OUTPATIENT
Start: 2024-01-18 | End: 2024-01-26

## 2024-01-18 ASSESSMENT — PATIENT HEALTH QUESTIONNAIRE - PHQ9
SUM OF ALL RESPONSES TO PHQ9 QUESTIONS 1 & 2: 0
SUM OF ALL RESPONSES TO PHQ QUESTIONS 1-9: 0
2. FEELING DOWN, DEPRESSED OR HOPELESS: 0
SUM OF ALL RESPONSES TO PHQ QUESTIONS 1-9: 0
SUM OF ALL RESPONSES TO PHQ QUESTIONS 1-9: 0
1. LITTLE INTEREST OR PLEASURE IN DOING THINGS: 0
SUM OF ALL RESPONSES TO PHQ QUESTIONS 1-9: 0

## 2024-01-18 NOTE — PROGRESS NOTES
Reviewed and updated this visit by provider:  Tobacco  Allergies  Meds  Problems  Med Hx  Surg Hx  Fam Hx       Immunizations:  Immunization status: up to date and documented.    Review of Systems - Negative except as stated in HPI  History obtained from chart review and the patient  General ROS: positive for  - fatigue  negative for - fever  ENT ROS: positive for - nasal discharge and congestion - dark brown (see media)  Respiratory ROS: positive for - cough  negative for - shortness of breath  Cardiovascular ROS: no chest pain or dyspnea on exertion  negative for - palpitations    Visit Vitals  /74 (Site: Left Upper Arm, Position: Sitting)   Pulse 85   Temp 97.2 °F (36.2 °C) (Temporal)   Ht 1.753 m (5' 9\")   Wt 71.5 kg (157 lb 9.6 oz)   SpO2 97%   BMI 23.27 kg/m²     Physical Examination: General appearance - alert, well appearing, and in no distress, oriented to person, place, and time, normal appearing weight, and acyanotic, in no respiratory distress  Mental status - alert, oriented to person, place, and time, normal mood, behavior, speech, dress, motor activity, and thought processes, affect appropriate to mood  Ears - TM's dull/intact bilaterally (L>R)  Nose - mucosal congestion and mucosal erythema  Mouth - erythematous  Neck - supple, no significant adenopathy, thyroid exam: thyroid is normal in size without nodules or tenderness  Chest - clear to auscultation, no wheezes, rales or rhonchi, symmetric air entry  Heart - normal rate, regular rhythm, normal S1, S2, no murmurs, rubs, clicks or gallops    Assessment/Plan:  1. Chronic sinus complaints    2. Acute rhinosinusitis      Orders Placed This Encounter   Procedures    CT SINUS WO CONTRAST     Standing Status:   Future     Standing Expiration Date:   1/18/2025     Orders Placed This Encounter   Medications    predniSONE (DELTASONE) 10 MG tablet     Sig: 3 tabs daily x 2 days; 2 tabs daily x 2 days; 1 tab daily x 2 days; 1/2 tab daily x 2

## 2024-01-23 ENCOUNTER — HOSPITAL ENCOUNTER (OUTPATIENT)
Dept: CT IMAGING | Age: 64
Discharge: HOME | End: 2024-01-25
Payer: MEDICARE

## 2024-01-23 DIAGNOSIS — R09.89 CHRONIC SINUS COMPLAINTS: Primary | ICD-10-CM

## 2024-01-23 DIAGNOSIS — J01.90 ACUTE RHINOSINUSITIS: ICD-10-CM

## 2024-01-23 DIAGNOSIS — R09.89 CHRONIC SINUS COMPLAINTS: ICD-10-CM

## 2024-01-23 PROCEDURE — 70486 CT MAXILLOFACIAL W/O DYE: CPT

## 2024-01-23 RX ORDER — AMOXICILLIN AND CLAVULANATE POTASSIUM 875; 125 MG/1; MG/1
1 TABLET, FILM COATED ORAL 2 TIMES DAILY
Qty: 20 TABLET | Refills: 0 | Status: SHIPPED | OUTPATIENT
Start: 2024-01-23 | End: 2024-02-02

## 2024-01-29 ENCOUNTER — OFFICE VISIT (OUTPATIENT)
Dept: ENT CLINIC | Age: 64
End: 2024-01-29
Payer: MEDICARE

## 2024-01-29 VITALS
HEIGHT: 69 IN | WEIGHT: 156 LBS | DIASTOLIC BLOOD PRESSURE: 72 MMHG | BODY MASS INDEX: 23.11 KG/M2 | SYSTOLIC BLOOD PRESSURE: 126 MMHG

## 2024-01-29 DIAGNOSIS — J32.4 CHRONIC PANSINUSITIS: Primary | ICD-10-CM

## 2024-01-29 DIAGNOSIS — J30.9 ALLERGIC RHINITIS, UNSPECIFIED SEASONALITY, UNSPECIFIED TRIGGER: ICD-10-CM

## 2024-01-29 DIAGNOSIS — J32.0 CHRONIC MAXILLARY SINUSITIS: ICD-10-CM

## 2024-01-29 DIAGNOSIS — H93.8X2 EAR PRESSURE, LEFT: ICD-10-CM

## 2024-01-29 PROCEDURE — G8428 CUR MEDS NOT DOCUMENT: HCPCS | Performed by: STUDENT IN AN ORGANIZED HEALTH CARE EDUCATION/TRAINING PROGRAM

## 2024-01-29 PROCEDURE — G8420 CALC BMI NORM PARAMETERS: HCPCS | Performed by: STUDENT IN AN ORGANIZED HEALTH CARE EDUCATION/TRAINING PROGRAM

## 2024-01-29 PROCEDURE — 1036F TOBACCO NON-USER: CPT | Performed by: STUDENT IN AN ORGANIZED HEALTH CARE EDUCATION/TRAINING PROGRAM

## 2024-01-29 PROCEDURE — 99204 OFFICE O/P NEW MOD 45 MIN: CPT | Performed by: STUDENT IN AN ORGANIZED HEALTH CARE EDUCATION/TRAINING PROGRAM

## 2024-01-29 PROCEDURE — 92567 TYMPANOMETRY: CPT | Performed by: STUDENT IN AN ORGANIZED HEALTH CARE EDUCATION/TRAINING PROGRAM

## 2024-01-29 PROCEDURE — G8484 FLU IMMUNIZE NO ADMIN: HCPCS | Performed by: STUDENT IN AN ORGANIZED HEALTH CARE EDUCATION/TRAINING PROGRAM

## 2024-01-29 PROCEDURE — 31231 NASAL ENDOSCOPY DX: CPT | Performed by: STUDENT IN AN ORGANIZED HEALTH CARE EDUCATION/TRAINING PROGRAM

## 2024-01-29 PROCEDURE — 3017F COLORECTAL CA SCREEN DOC REV: CPT | Performed by: STUDENT IN AN ORGANIZED HEALTH CARE EDUCATION/TRAINING PROGRAM

## 2024-01-29 RX ORDER — AZELASTINE 1 MG/ML
2 SPRAY, METERED NASAL 2 TIMES DAILY
Qty: 120 ML | Refills: 5 | Status: SHIPPED | OUTPATIENT
Start: 2024-01-29

## 2024-01-29 ASSESSMENT — ENCOUNTER SYMPTOMS
VOMITING: 0
SHORTNESS OF BREATH: 0
EYE REDNESS: 0
EYE ITCHING: 0

## 2024-01-29 NOTE — PROGRESS NOTES
[facility] Office Note    Patient: Emery Ram III  MRN: [id number]  : 1960  Gender:  male  Vital Signs: /72 (Site: Left Upper Arm, Position: Sitting)   Ht 1.753 m (5' 9\")   Wt 70.8 kg (156 lb)   BMI 23.04 kg/m²   Date: 2024    CC:   Chief Complaint   Patient presents with    New Patient     Patient presents today for chronic sinus issues. He mentions a crackling in his left ear.        HPI:  Emery Ram III is a 63 y.o. male here for evaluation of chronic sinusitis.  Notes from referring provider reviewed.  He was started on a course of Augmentin on 2024.  Prior to that he has been given a course of Augmentin and then doxycycline as well.  Sinus symptoms started in December after getting COVID.  He endorses discolored nasal drainage, facial pressure, dental pain, and nasal obstruction.  He takes Eliquis.  He is currently on Augmentin and symptoms are improving.  He also notes left ear pressure and popping.  No significant history of recurrent sinus infections.  Past Medical History, Past Surgical History, Family history, Social History, and Medications were all reviewed with the patient today and updated as necessary.     Allergies   Allergen Reactions    Dust Mite Extract     Molds & Smuts      Patient Active Problem List   Diagnosis    Mitral valve prolapse    Anxiety    Atrial fibrillation with RVR ([facility])    Anticoagulated    First degree AV block    Nonischemic cardiomyopathy ([facility])    Insomnia    Non-rheumatic mitral regurgitation    Mobitz (type) I (Wenckebach's) atrioventricular block    Major depressive disorder, single episode, mild ([facility])    Paroxysmal atrial fibrillation ([facility])    S/P MVR (mitral valve repair)    Mitral valve regurgitation    Chronic systolic (congestive) heart failure     Current Outpatient Medications   Medication Sig    azelastine (ASTELIN) 0.1 % nasal spray 2 sprays by Nasal route 2 times daily Use in each nostril as directed
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
## 2024-02-23 ENCOUNTER — OFFICE VISIT (OUTPATIENT)
Age: 64
End: 2024-02-23

## 2024-02-23 VITALS
HEART RATE: 82 BPM | HEIGHT: 69 IN | DIASTOLIC BLOOD PRESSURE: 78 MMHG | WEIGHT: 160 LBS | BODY MASS INDEX: 23.7 KG/M2 | SYSTOLIC BLOOD PRESSURE: 132 MMHG

## 2024-02-23 DIAGNOSIS — I50.22 CHRONIC SYSTOLIC (CONGESTIVE) HEART FAILURE (HCC): Primary | ICD-10-CM

## 2024-02-23 DIAGNOSIS — I42.8 NONISCHEMIC CARDIOMYOPATHY (HCC): ICD-10-CM

## 2024-02-23 DIAGNOSIS — Z98.890 S/P MVR (MITRAL VALVE REPAIR): ICD-10-CM

## 2024-02-23 DIAGNOSIS — I48.91 ATRIAL FIBRILLATION WITH RVR (HCC): ICD-10-CM

## 2024-02-23 DIAGNOSIS — I34.1 MITRAL VALVE PROLAPSE: ICD-10-CM

## 2024-02-23 DIAGNOSIS — I48.0 PAROXYSMAL ATRIAL FIBRILLATION (HCC): ICD-10-CM

## 2024-02-23 RX ORDER — METOPROLOL SUCCINATE 25 MG/1
25 TABLET, EXTENDED RELEASE ORAL DAILY
Qty: 90 TABLET | Refills: 3 | Status: SHIPPED | OUTPATIENT
Start: 2024-02-23

## 2024-02-23 RX ORDER — CETIRIZINE HYDROCHLORIDE 10 MG/1
10 TABLET ORAL DAILY
COMMUNITY

## 2024-02-23 ASSESSMENT — ENCOUNTER SYMPTOMS: SHORTNESS OF BREATH: 0

## 2024-02-23 NOTE — PATIENT INSTRUCTIONS
Patient Education        Learning About Mindfulness for Stress  What are mindfulness and stress?     Stress is your body's response to a hard situation. Your body can have a physical, emotional, or mental response. A lot of things can cause stress. You may feel stress when you go on a job interview, take a test, or run a race. This kind of short-term stress is normal and even useful. It can help you if you need to work hard or react quickly.  Stress also can last a long time. Long-term stress is caused by stressful situations or events. Examples of long-term stress include long-term health problems, ongoing problems at work, and conflicts in your family. Long-term stress can harm your health.  Mindfulness is a focus only on things happening in the present moment. It's a process of purposefully paying attention to and being aware of your surroundings, your emotions, your thoughts, and how your body feels. You are aware of these things, but you aren't judging these experiences as \"good\" or \"bad.\" Mindfulness can help you learn to calm your mind and body to help you cope with illness, pain, and stress.  How does mindfulness help to relieve stress?  Mindfulness can help quiet your mind and relax your body. Studies show that it can help some people sleep better, feel less anxious, and bring their blood pressure down. And it's been shown to help some people live and cope better with certain health problems like heart disease, depression, chronic pain, and cancer.  How do you practice mindfulness?  To be mindful is to pay attention, to be present, and to be accepting. Like any new skill or habit, being mindful can take practice.  When you're mindful, you do just one thing and you pay close attention to that one thing. For example, you may sit quietly and notice your emotions or how your food tastes and smells.  When you're present, you focus on the things that are happening right now. You let go of your thoughts about the

## 2024-03-04 RX ORDER — AMOXICILLIN 500 MG/1
2000 CAPSULE ORAL
Qty: 4 CAPSULE | Refills: 5 | Status: SHIPPED | OUTPATIENT
Start: 2024-03-04 | End: 2024-03-04

## 2024-04-17 ENCOUNTER — OFFICE VISIT (OUTPATIENT)
Dept: INTERNAL MEDICINE CLINIC | Facility: CLINIC | Age: 64
End: 2024-04-17
Payer: MEDICARE

## 2024-04-17 VITALS
DIASTOLIC BLOOD PRESSURE: 80 MMHG | OXYGEN SATURATION: 98 % | BODY MASS INDEX: 23.64 KG/M2 | TEMPERATURE: 98.1 F | WEIGHT: 159.6 LBS | SYSTOLIC BLOOD PRESSURE: 134 MMHG | HEART RATE: 85 BPM | HEIGHT: 69 IN

## 2024-04-17 DIAGNOSIS — J30.2 SEASONAL ALLERGIES: ICD-10-CM

## 2024-04-17 DIAGNOSIS — J01.90 ACUTE RHINOSINUSITIS: Primary | ICD-10-CM

## 2024-04-17 PROCEDURE — G8420 CALC BMI NORM PARAMETERS: HCPCS | Performed by: NURSE PRACTITIONER

## 2024-04-17 PROCEDURE — 99213 OFFICE O/P EST LOW 20 MIN: CPT | Performed by: NURSE PRACTITIONER

## 2024-04-17 PROCEDURE — 3017F COLORECTAL CA SCREEN DOC REV: CPT | Performed by: NURSE PRACTITIONER

## 2024-04-17 PROCEDURE — G8427 DOCREV CUR MEDS BY ELIG CLIN: HCPCS | Performed by: NURSE PRACTITIONER

## 2024-04-17 PROCEDURE — 1036F TOBACCO NON-USER: CPT | Performed by: NURSE PRACTITIONER

## 2024-04-17 RX ORDER — OLOPATADINE HYDROCHLORIDE AND MOMETASONE FUROATE 25; 665 UG/1; UG/1
SPRAY, METERED NASAL
Qty: 29 G | Refills: 1 | Status: SHIPPED | OUTPATIENT
Start: 2024-04-17

## 2024-04-17 RX ORDER — AZITHROMYCIN 250 MG/1
TABLET, FILM COATED ORAL
Qty: 6 TABLET | Refills: 0 | Status: SHIPPED | OUTPATIENT
Start: 2024-04-17 | End: 2024-04-17 | Stop reason: CLARIF

## 2024-04-17 RX ORDER — AMOXICILLIN AND CLAVULANATE POTASSIUM 875; 125 MG/1; MG/1
1 TABLET, FILM COATED ORAL 2 TIMES DAILY
Qty: 20 TABLET | Refills: 0 | Status: SHIPPED | OUTPATIENT
Start: 2024-04-17 | End: 2024-04-27

## 2024-04-17 SDOH — ECONOMIC STABILITY: FOOD INSECURITY: WITHIN THE PAST 12 MONTHS, THE FOOD YOU BOUGHT JUST DIDN'T LAST AND YOU DIDN'T HAVE MONEY TO GET MORE.: NEVER TRUE

## 2024-04-17 SDOH — ECONOMIC STABILITY: HOUSING INSECURITY
IN THE LAST 12 MONTHS, WAS THERE A TIME WHEN YOU DID NOT HAVE A STEADY PLACE TO SLEEP OR SLEPT IN A SHELTER (INCLUDING NOW)?: NO

## 2024-04-17 SDOH — ECONOMIC STABILITY: INCOME INSECURITY: HOW HARD IS IT FOR YOU TO PAY FOR THE VERY BASICS LIKE FOOD, HOUSING, MEDICAL CARE, AND HEATING?: NOT HARD AT ALL

## 2024-04-17 SDOH — ECONOMIC STABILITY: FOOD INSECURITY: WITHIN THE PAST 12 MONTHS, YOU WORRIED THAT YOUR FOOD WOULD RUN OUT BEFORE YOU GOT MONEY TO BUY MORE.: NEVER TRUE

## 2024-04-17 ASSESSMENT — PATIENT HEALTH QUESTIONNAIRE - PHQ9
SUM OF ALL RESPONSES TO PHQ9 QUESTIONS 1 & 2: 0
SUM OF ALL RESPONSES TO PHQ QUESTIONS 1-9: 0
2. FEELING DOWN, DEPRESSED OR HOPELESS: NOT AT ALL
SUM OF ALL RESPONSES TO PHQ QUESTIONS 1-9: 0
1. LITTLE INTEREST OR PLEASURE IN DOING THINGS: NOT AT ALL
SUM OF ALL RESPONSES TO PHQ QUESTIONS 1-9: 0
SUM OF ALL RESPONSES TO PHQ QUESTIONS 1-9: 0

## 2024-04-17 NOTE — PROGRESS NOTES
Emery Ram III (: 1960) presents today c/o nasal and sinus congestion for several weeks.  He had been running outdoors. Initially, he states it started with post-nasal drainage and sneezing. He started running indoors and taking Zyrtec daily.  Four days ago, he ran 5.5 miles.  Two days ago, he walked/ran 4 miles. Last night, he had started coughing up dark brown sputum. He also had left sided nare congestion and blew out a hard mucous plug and then bloody, brown mucus. Afebrile.     Chief Complaint   Patient presents with    Nasal Congestion    Sinus Problem     Patient Active Problem List   Diagnosis    Mitral valve prolapse    Anxiety    Atrial fibrillation with RVR ([facility])    Anticoagulated    First degree AV block    Nonischemic cardiomyopathy (HCC)    Insomnia    Non-rheumatic mitral regurgitation    Mobitz (type) I (Wenckebach's) atrioventricular block    Major depressive disorder, single episode, mild (HCC)    Paroxysmal atrial fibrillation ([facility])    S/P MVR (mitral valve repair)    Mitral valve regurgitation    Chronic systolic (congestive) heart failure        Reviewed and updated this visit by provider:  Tobacco  Allergies  Meds  Problems  Med Hx  Surg Hx  Fam Hx       Immunizations:  Immunization status: up to date and documented.    Review of Systems - Negative except as stated in HPI  History obtained from chart review and the patient  General ROS: negative for - fever  ENT ROS: positive for - nasal congestion, nasal discharge, sinus pain, and post-nasal drainage  Respiratory ROS: no cough, shortness of breath, or wheezing    Visit Vitals  /80 (Site: Left Upper Arm, Position: Sitting)   Pulse 85   Temp 98.1 °F (36.7 °C) (Temporal)   Ht 1.753 m (5' 9\")   Wt 72.4 kg (159 lb 9.6 oz)   SpO2 98%   BMI 23.57 kg/m²     Physical Examination: General appearance - alert, well appearing, and in no distress, oriented to person, place, and time, normal appearing weight, and acyanotic,

## 2024-04-22 ENCOUNTER — OFFICE VISIT (OUTPATIENT)
Dept: ENT CLINIC | Age: 64
End: 2024-04-22
Payer: MEDICARE

## 2024-04-22 VITALS
BODY MASS INDEX: 23.74 KG/M2 | DIASTOLIC BLOOD PRESSURE: 78 MMHG | WEIGHT: 160.3 LBS | HEIGHT: 69 IN | SYSTOLIC BLOOD PRESSURE: 128 MMHG

## 2024-04-22 DIAGNOSIS — J34.3 HYPERTROPHY OF BOTH INFERIOR NASAL TURBINATES: ICD-10-CM

## 2024-04-22 DIAGNOSIS — J32.0 CHRONIC MAXILLARY SINUSITIS: Primary | ICD-10-CM

## 2024-04-22 PROCEDURE — 31231 NASAL ENDOSCOPY DX: CPT | Performed by: STUDENT IN AN ORGANIZED HEALTH CARE EDUCATION/TRAINING PROGRAM

## 2024-04-22 PROCEDURE — G8420 CALC BMI NORM PARAMETERS: HCPCS | Performed by: STUDENT IN AN ORGANIZED HEALTH CARE EDUCATION/TRAINING PROGRAM

## 2024-04-22 PROCEDURE — G8427 DOCREV CUR MEDS BY ELIG CLIN: HCPCS | Performed by: STUDENT IN AN ORGANIZED HEALTH CARE EDUCATION/TRAINING PROGRAM

## 2024-04-22 PROCEDURE — 3017F COLORECTAL CA SCREEN DOC REV: CPT | Performed by: STUDENT IN AN ORGANIZED HEALTH CARE EDUCATION/TRAINING PROGRAM

## 2024-04-22 PROCEDURE — 1036F TOBACCO NON-USER: CPT | Performed by: STUDENT IN AN ORGANIZED HEALTH CARE EDUCATION/TRAINING PROGRAM

## 2024-04-22 PROCEDURE — 99213 OFFICE O/P EST LOW 20 MIN: CPT | Performed by: STUDENT IN AN ORGANIZED HEALTH CARE EDUCATION/TRAINING PROGRAM

## 2024-04-22 ASSESSMENT — ENCOUNTER SYMPTOMS
RHINORRHEA: 1
EYE REDNESS: 0
SHORTNESS OF BREATH: 0
VOMITING: 0
EYE ITCHING: 0

## 2024-04-22 NOTE — PROGRESS NOTES
[facility] Office Note    Patient: Emery Ram III  MRN: [id number]  : 1960  Gender:  male  Vital Signs: /78 (Site: Left Upper Arm, Position: Sitting)   Ht 1.753 m (5' 9\")   Wt 72.7 kg (160 lb 4.8 oz)   BMI 23.67 kg/m²   Date: 2024    CC:   Chief Complaint   Patient presents with    Sinus Problem     Patient presents today for sinus issues. He was having some brown drainage and sinus pressure on 24. He is currently on Augmentin.        HPI:  Emery Ram III is a 63 y.o. male w/ CRS. He was started on a course of Augmentin on 2024.  Prior to that he has been given a course of Augmentin and then doxycycline as well.  Sinus symptoms started in December after getting COVID.  He endorses discolored nasal drainage, facial pressure, dental pain, and nasal obstruction.  He takes Eliquis.  I gave him a 3-week course of doxycycline.  He is currently on Augmentin.  He uses Flonase and Zyrtec.  Astelin made him sleepy.  He has had discolored nasal drainage on the left.  He has had some bleeding from the left nasal passage.    Past Medical History, Past Surgical History, Family history, Social History, and Medications were all reviewed with the patient today and updated as necessary.     Allergies   Allergen Reactions    Dust Mite Extract     Molds & Smuts      Patient Active Problem List   Diagnosis    Mitral valve prolapse    Anxiety    Atrial fibrillation with RVR ([facility])    Anticoagulated    First degree AV block    Nonischemic cardiomyopathy ([facility])    Insomnia    Non-rheumatic mitral regurgitation    Mobitz (type) I (Wenckebach's) atrioventricular block    Major depressive disorder, single episode, mild ([facility])    Paroxysmal atrial fibrillation ([facility])    S/P MVR (mitral valve repair)    Mitral valve regurgitation    Chronic systolic (congestive) heart failure     Current Outpatient Medications   Medication Sig    Olopatadine-Mometasone (RYALTRIS) 665-25 MCG/ACT SUSP 2 sprays

## 2024-04-25 ENCOUNTER — TELEMEDICINE (OUTPATIENT)
Dept: INTERNAL MEDICINE CLINIC | Facility: CLINIC | Age: 64
End: 2024-04-25

## 2024-04-25 DIAGNOSIS — J01.90 ACUTE RHINOSINUSITIS: Primary | ICD-10-CM

## 2024-04-25 DIAGNOSIS — R09.89 CHRONIC SINUS COMPLAINTS: ICD-10-CM

## 2024-04-25 RX ORDER — AMOXICILLIN AND CLAVULANATE POTASSIUM 875; 125 MG/1; MG/1
1 TABLET, FILM COATED ORAL 2 TIMES DAILY
Qty: 28 TABLET | Refills: 0 | Status: SHIPPED | OUTPATIENT
Start: 2024-04-25 | End: 2024-05-09

## 2024-04-25 ASSESSMENT — ENCOUNTER SYMPTOMS
COUGH: 0
SINUS PRESSURE: 1
SINUS COMPLAINT: 1
SINUS PAIN: 1

## 2024-04-25 NOTE — PROGRESS NOTES
Emery Ram III, was evaluated through a synchronous (real-time) audio-video encounter. The patient (or guardian if applicable) is aware that this is a billable service, which includes applicable co-pays. This Virtual Visit was conducted with patient's (and/or legal guardian's) consent. Patient identification was verified, and a caregiver was present when appropriate.   The patient was located at Home: [address] [place] [id number]  Provider was located at Facility (Appt Dept): [facility] [address] [place] [phone]  Confirm you are appropriately licensed, registered, or certified to deliver care in the state where the patient is located as indicated above. If you are not or unsure, please re-schedule the visit: Yes, I confirm.     Emery Ram III (:  1960) is a Established patient, presenting virtually for evaluation of the following:    Assessment & Plan   Below is the assessment and plan developed based on review of pertinent history, physical exam, labs, studies, and medications.  1. Acute rhinosinusitis  -     amoxicillin-clavulanate (AUGMENTIN) 875-125 MG per tablet; Take 1 tablet by mouth 2 times daily for 14 days, Disp-28 tablet, R-0Normal  2. Chronic sinus complaints    Continue Augmentin to total 14 days.  Add steroid rinse as prescribed by Dr. Martinez (ENT).  Continue probiotic and immune support supplement.    Return if symptoms worsen or fail to improve.       Subjective   Emery Ram III (: 1960) c/o persistent sinus congestion. He is currently being treated with Augmentin 875mg BID starting on 2024. He saw ENT (Dr. Martinez) on 2024.  Nasal endoscopy showed left maxillofacial sinus inflammation and blood. He was prescribed \"steroid rinse\" via compound pharmacy. Afebrile. He is reluctant to sinus surgery secondary to daily Eliquis use.    Sinus Problem  Associated symptoms include congestion and sinus

## 2024-05-05 ENCOUNTER — PATIENT MESSAGE (OUTPATIENT)
Dept: INTERNAL MEDICINE CLINIC | Facility: CLINIC | Age: 64
End: 2024-05-05

## 2024-05-06 RX ORDER — HYDROCORTISONE 25 MG/G
CREAM TOPICAL
Qty: 28 G | Refills: 1 | Status: SHIPPED | OUTPATIENT
Start: 2024-05-06

## 2024-05-06 NOTE — TELEPHONE ENCOUNTER
From: Emery Ram III  To: Julia Schmidt Paduano  Sent: 5/5/2024 2:19 PM EDT  Subject: Can you refill hemorrhoid cream?    Sarita, Through all the antibiotics and diet changes I’ve had over the last few months, my hemorrhoid has flared up. Could you please refill the hemorrhoid cream with hydrocortisone 2.5/pramoxine that you gave me a couple of years ago? Send it to [facility]. Let me know. Thanks so much.     PS The Budesonide sinus rinse seems to be working pretty good so far. Breathing through my nose has really improved since starting it. Allergies are still bothering me some but hoping the pollen is going to be getting better sooner than later.

## 2024-05-06 NOTE — TELEPHONE ENCOUNTER
Requested Prescriptions     Signed Prescriptions Disp Refills    hydrocortisone (ANUSOL-HC) 2.5 % CREA rectal cream 28 g 1     Sig: Apply to rectum BID as needed.     Authorizing Provider: PADUANO, JULIA SCHMIDT

## 2024-06-03 ENCOUNTER — OFFICE VISIT (OUTPATIENT)
Dept: ENT CLINIC | Age: 64
End: 2024-06-03

## 2024-06-03 VITALS — WEIGHT: 160 LBS | HEIGHT: 69 IN | BODY MASS INDEX: 23.7 KG/M2

## 2024-06-03 DIAGNOSIS — J30.9 ALLERGIC RHINITIS, UNSPECIFIED SEASONALITY, UNSPECIFIED TRIGGER: ICD-10-CM

## 2024-06-03 DIAGNOSIS — J32.0 CHRONIC MAXILLARY SINUSITIS: Primary | ICD-10-CM

## 2024-06-03 RX ORDER — ASPIRIN 81 MG/1
81 TABLET ORAL DAILY
Qty: 90 TABLET | Refills: 3 | Status: SHIPPED | OUTPATIENT
Start: 2024-06-03

## 2024-06-03 RX ORDER — METOPROLOL SUCCINATE 25 MG/1
25 TABLET, EXTENDED RELEASE ORAL DAILY
Qty: 90 TABLET | Refills: 3 | Status: SHIPPED | OUTPATIENT
Start: 2024-06-03

## 2024-06-03 ASSESSMENT — ENCOUNTER SYMPTOMS
GASTROINTESTINAL NEGATIVE: 1
RESPIRATORY NEGATIVE: 1
ALLERGIC/IMMUNOLOGIC NEGATIVE: 1
EYES NEGATIVE: 1

## 2024-06-03 NOTE — PROGRESS NOTES
2 sprays each nostril BID    cetirizine (ZYRTEC) 10 MG tablet Take 1 tablet by mouth daily    zolpidem (AMBIEN CR) 12.5 MG extended release tablet Take 1 tablet by mouth nightly as needed.    apixaban (ELIQUIS) 5 MG TABS tablet Take 1 tablet by mouth 2 times daily TAKE 1 TABLET TWICE A DAY FOR TREATMENT TO PREVENT BLOOD CLOTS IN CHRONIC ATRIAL FIBRILLATION    LORazepam (ATIVAN) 1 MG tablet Take 1 tablet by mouth 2 times daily.    sertraline (ZOLOFT) 100 MG tablet Take 1 tablet by mouth daily     No current facility-administered medications for this visit.     Past Medical History:   Diagnosis Date    Allergic rhinitis due to other allergen 2/25/2015    Anticoagulated 4/3/2018    Anxiety state, unspecified 2/25/2015    CAD (coronary artery disease)     mild non obstructive , awaitng Maze for mitral valve repair/replacement    Depression     started with loss of parents 2010    First degree hemorrhoids 2/20/2017    Insomnia, unspecified 2/25/2015    Lipoma 2/25/2015    Mitral valve regurgitation     Paroxysmal A-fib (HCC)          Unspecified adjustment reaction 2/25/2015     Social History     Tobacco Use    Smoking status: Never    Smokeless tobacco: Never   Substance Use Topics    Alcohol use: No     Past Surgical History:   Procedure Laterality Date    ADENOIDECTOMY      as a child    CARDIAC CATHETERIZATION  03/15/2018    ECHO TRANSESOPHAGEAL (KAREN) W OR WO CONTR   3/15/2018         HEENT  1996    wisdom teeth ext    MITRAL VALVULOPLASTY  11/26/2019    Dr. Florian @ [facility]    MITRAL VALVULOPLASTY  2018    [facility]    ORTHOPEDIC SURGERY  late 1990's    cyst from left ring finger     TONSILLECTOMY      as a child    WISDOM TOOTH EXTRACTION       Family History   Problem Relation Age of Onset    Alcohol Abuse Mother     Heart Failure Paternal Grandfather     Coronary Art Dis Paternal Grandfather         ROS:    Review of Systems   Constitutional: Negative.    HENT: Negative.     Eyes: Negative.    Respiratory:

## 2024-06-03 NOTE — TELEPHONE ENCOUNTER
Requested Prescriptions     Pending Prescriptions Disp Refills    aspirin 81 MG EC tablet 90 tablet 3     Sig: Take 1 tablet by mouth daily TAKE 1 TABLET BY MOUTH DAILY    metoprolol succinate (TOPROL XL) 25 MG extended release tablet 90 tablet 3     Sig: Take 1 tablet by mouth daily     Rx verified last ov 2/23/24

## 2024-06-06 ENCOUNTER — PATIENT MESSAGE (OUTPATIENT)
Dept: INTERNAL MEDICINE CLINIC | Facility: CLINIC | Age: 64
End: 2024-06-06

## 2024-06-06 DIAGNOSIS — I48.0 PAROXYSMAL ATRIAL FIBRILLATION (HCC): ICD-10-CM

## 2024-06-06 DIAGNOSIS — E78.00 HYPERCHOLESTEREMIA: ICD-10-CM

## 2024-06-06 DIAGNOSIS — I50.22 CHRONIC SYSTOLIC (CONGESTIVE) HEART FAILURE (HCC): Primary | ICD-10-CM

## 2024-06-06 NOTE — TELEPHONE ENCOUNTER
From: Emery Ram III  To: Julia Schmidt Paduano  Sent: 6/6/2024 2:43 PM EDT  Subject: Bloodwork for 6-month visit next week    Sarita, I see Leah next Friday for my 6-month checkup. I wanted to ask you ahead of time, can I come up there next week sometime before Friday and get you to draw the blood? Just tell me when and I will be there. I’m also not sure whether I am to be fasting or not. Let me know if so and what time the night before to not eat. Thank you bery much! Chip

## 2024-06-10 ENCOUNTER — NURSE ONLY (OUTPATIENT)
Dept: INTERNAL MEDICINE CLINIC | Facility: CLINIC | Age: 64
End: 2024-06-10
Payer: MEDICARE

## 2024-06-10 DIAGNOSIS — E78.00 HYPERCHOLESTEREMIA: ICD-10-CM

## 2024-06-10 DIAGNOSIS — I48.0 PAROXYSMAL ATRIAL FIBRILLATION (HCC): ICD-10-CM

## 2024-06-10 DIAGNOSIS — I50.22 CHRONIC SYSTOLIC (CONGESTIVE) HEART FAILURE (HCC): Primary | ICD-10-CM

## 2024-06-10 DIAGNOSIS — I50.22 CHRONIC SYSTOLIC (CONGESTIVE) HEART FAILURE (HCC): ICD-10-CM

## 2024-06-10 LAB
ALBUMIN SERPL-MCNC: 4.5 G/DL (ref 3.2–4.6)
ALBUMIN/GLOB SERPL: 1.8 (ref 1–1.9)
ALP SERPL-CCNC: 76 U/L (ref 40–129)
ALT SERPL-CCNC: 27 U/L (ref 12–65)
ANION GAP SERPL CALC-SCNC: 12 MMOL/L (ref 9–18)
AST SERPL-CCNC: 33 U/L (ref 15–37)
BASOPHILS # BLD: 0.1 K/UL (ref 0–0.2)
BASOPHILS NFR BLD: 1 % (ref 0–2)
BILIRUB SERPL-MCNC: 0.8 MG/DL (ref 0–1.2)
BUN SERPL-MCNC: 17 MG/DL (ref 8–23)
CALCIUM SERPL-MCNC: 9.6 MG/DL (ref 8.8–10.2)
CHLORIDE SERPL-SCNC: 105 MMOL/L (ref 98–107)
CHOLEST SERPL-MCNC: 151 MG/DL (ref 0–200)
CO2 SERPL-SCNC: 25 MMOL/L (ref 20–28)
CREAT SERPL-MCNC: 0.83 MG/DL (ref 0.8–1.3)
DIFFERENTIAL METHOD BLD: NORMAL
EOSINOPHIL # BLD: 0.1 K/UL (ref 0–0.8)
EOSINOPHIL NFR BLD: 1 % (ref 0.5–7.8)
ERYTHROCYTE [DISTWIDTH] IN BLOOD BY AUTOMATED COUNT: 12.9 % (ref 11.9–14.6)
GLOBULIN SER CALC-MCNC: 2.5 G/DL (ref 2.3–3.5)
GLUCOSE SERPL-MCNC: 100 MG/DL (ref 70–99)
HCT VFR BLD AUTO: 49.3 % (ref 41.1–50.3)
HDLC SERPL-MCNC: 54 MG/DL (ref 40–60)
HDLC SERPL: 2.8 (ref 0–5)
HGB BLD-MCNC: 16.3 G/DL (ref 13.6–17.2)
IMM GRANULOCYTES # BLD AUTO: 0 K/UL (ref 0–0.5)
IMM GRANULOCYTES NFR BLD AUTO: 0 % (ref 0–5)
LDLC SERPL CALC-MCNC: 84 MG/DL (ref 0–100)
LYMPHOCYTES # BLD: 1.4 K/UL (ref 0.5–4.6)
LYMPHOCYTES NFR BLD: 22 % (ref 13–44)
MCH RBC QN AUTO: 30.4 PG (ref 26.1–32.9)
MCHC RBC AUTO-ENTMCNC: 33.1 G/DL (ref 31.4–35)
MCV RBC AUTO: 92 FL (ref 82–102)
MONOCYTES # BLD: 0.4 K/UL (ref 0.1–1.3)
MONOCYTES NFR BLD: 6 % (ref 4–12)
NEUTS SEG # BLD: 4.4 K/UL (ref 1.7–8.2)
NEUTS SEG NFR BLD: 70 % (ref 43–78)
NRBC # BLD: 0 K/UL (ref 0–0.2)
PLATELET # BLD AUTO: 218 K/UL (ref 150–450)
PMV BLD AUTO: 9.9 FL (ref 9.4–12.3)
POTASSIUM SERPL-SCNC: 4.6 MMOL/L (ref 3.5–5.1)
PROT SERPL-MCNC: 7 G/DL (ref 6.3–8.2)
RBC # BLD AUTO: 5.36 M/UL (ref 4.23–5.6)
SODIUM SERPL-SCNC: 142 MMOL/L (ref 136–145)
TRIGL SERPL-MCNC: 64 MG/DL (ref 0–150)
TSH, 3RD GENERATION: 2.56 UIU/ML (ref 0.27–4.2)
VLDLC SERPL CALC-MCNC: 13 MG/DL (ref 6–23)
WBC # BLD AUTO: 6.3 K/UL (ref 4.3–11.1)

## 2024-06-10 PROCEDURE — 36415 COLL VENOUS BLD VENIPUNCTURE: CPT | Performed by: NURSE PRACTITIONER

## 2024-06-10 NOTE — PROGRESS NOTES
Labs drawn on 2nd attempt from right AC with 22G needle. Dressing applied.    Julia K Paduano, APRN - CNP

## 2024-06-14 ENCOUNTER — OFFICE VISIT (OUTPATIENT)
Dept: INTERNAL MEDICINE CLINIC | Facility: CLINIC | Age: 64
End: 2024-06-14

## 2024-06-14 VITALS
WEIGHT: 159 LBS | HEIGHT: 69 IN | DIASTOLIC BLOOD PRESSURE: 70 MMHG | SYSTOLIC BLOOD PRESSURE: 132 MMHG | BODY MASS INDEX: 23.55 KG/M2

## 2024-06-14 DIAGNOSIS — K64.9 HEMORRHOIDS, UNSPECIFIED HEMORRHOID TYPE: ICD-10-CM

## 2024-06-14 DIAGNOSIS — G47.01 INSOMNIA DUE TO MEDICAL CONDITION: ICD-10-CM

## 2024-06-14 DIAGNOSIS — F41.9 ANXIETY: ICD-10-CM

## 2024-06-14 DIAGNOSIS — I42.8 NONISCHEMIC CARDIOMYOPATHY (HCC): Primary | ICD-10-CM

## 2024-06-14 DIAGNOSIS — R09.81 SINUS CONGESTION: ICD-10-CM

## 2024-06-14 RX ORDER — HYDROCORTISONE ACETATE 25 MG/1
25 SUPPOSITORY RECTAL 2 TIMES DAILY
Qty: 12 SUPPOSITORY | Refills: 1 | Status: SHIPPED | OUTPATIENT
Start: 2024-06-14

## 2024-06-14 NOTE — PROGRESS NOTES
HPI: Emery Ram III (: 1960)      Has been seeing ENT for chronic sinus complaints and has been on sinus rinse and now for maintenance can do OTC    Has been having issues with int hemorrhoids since May with some bleeding noted        Problem List:  Patient Active Problem List   Diagnosis   • Mitral valve prolapse   • Anxiety   • Atrial fibrillation with RVR ([facility])   • Anticoagulated   • First degree AV block   • Nonischemic cardiomyopathy ([facility])   • Insomnia   • Non-rheumatic mitral regurgitation   • Mobitz (type) I (Wenckebach's) atrioventricular block   • Major depressive disorder, single episode, mild ([facility])   • Paroxysmal atrial fibrillation ([facility])   • S/P MVR (mitral valve repair)   • Mitral valve regurgitation   • Chronic systolic (congestive) heart failure       History:  Past Medical History:   Diagnosis Date   • Allergic rhinitis due to other allergen 2015   • Anticoagulated 4/3/2018   • Anxiety state, unspecified 2015   • CAD (coronary artery disease)     mild non obstructive , awaitng Maze for mitral valve repair/replacement   • Depression     started with loss of parents    • First degree hemorrhoids 2017   • Insomnia, unspecified 2015   • Lipoma 2015   • Mitral valve regurgitation    • Paroxysmal A-fib ([facility])         • Unspecified adjustment reaction 2015       Allergies:  Allergies   Allergen Reactions   • Dust Mite Extract    • Molds & Smuts        Current Medications:  Current Outpatient Medications   Medication Sig Dispense Refill   • aspirin 81 MG EC tablet Take 1 tablet by mouth daily TAKE 1 TABLET BY MOUTH DAILY 90 tablet 3   • metoprolol succinate (TOPROL XL) 25 MG extended release tablet Take 1 tablet by mouth daily 90 tablet 3   • hydrocortisone (ANUSOL-HC) 2.5 % CREA rectal cream Apply to rectum BID as needed. 28 g 1   • cetirizine (ZYRTEC) 10 MG tablet Take 1 tablet by mouth daily     • zolpidem (AMBIEN CR) 12.5 MG extended release tablet

## 2024-07-02 ENCOUNTER — PATIENT MESSAGE (OUTPATIENT)
Dept: INTERNAL MEDICINE CLINIC | Facility: CLINIC | Age: 64
End: 2024-07-02

## 2024-07-02 ENCOUNTER — E-VISIT (OUTPATIENT)
Dept: INTERNAL MEDICINE CLINIC | Facility: CLINIC | Age: 64
End: 2024-07-02
Payer: MEDICARE

## 2024-07-02 DIAGNOSIS — R21 RASH: Primary | ICD-10-CM

## 2024-07-02 DIAGNOSIS — B37.2 CANDIDAL DERMATITIS: Primary | ICD-10-CM

## 2024-07-02 PROCEDURE — 99422 OL DIG E/M SVC 11-20 MIN: CPT | Performed by: NURSE PRACTITIONER

## 2024-07-02 RX ORDER — FLUCONAZOLE 100 MG/1
100 TABLET ORAL DAILY
Qty: 7 TABLET | Refills: 0 | Status: SHIPPED | OUTPATIENT
Start: 2024-07-02 | End: 2024-07-09

## 2024-07-02 NOTE — TELEPHONE ENCOUNTER
From: Emery Ram III  To: Sarita Pitts Paduanmalathi  Sent: 7/2/2024 1:23 PM EDT  Subject: Oral medication for jock itch    Sarita, I have gotten what appears to be a jock itch over last few days. It is located though more around my anal area and in the cheek crack. Skin cracks, burning/itching and general irritation. Since it came up, I have been using Lotrimin spray, but it seems to get in the area I don't want it to go and seems to dry that area out. I’ve been sweating a lot during my workouts lately and between that and probably not drying off the area good enough has caused it. Back in 2018 after my first surgery I got one of these and Jermaine gave me an oral medication that I believe I took only 3 or 4 times for it. Would it hurt if you gave me this to take now? I would. Jose to hasten it going away as much as possible. If not, please let me know and call it in to the [facility] in [place]. Will watch for your reply. Thank you! Chip

## 2024-07-02 NOTE — PROGRESS NOTES
Emery Ram III (1960) initiated an asynchronous digital communication through [employer].    HPI: per patient questionnaire     Exam: not applicable    Diagnoses and all orders for this visit:  Diagnoses and all orders for this visit:    Candidal dermatitis  -     fluconazole (DIFLUCAN) 100 MG tablet; Take 1 tablet by mouth daily for 7 days    Diflucan as prescribed  - directions for use and side effects discussed.  Continue topical Lotrimin spray.  Keep skin cool/dry.    OV w/o improvement.      Time: EV2 - 11-20 minutes were spent on the digital evaluation and management of this patient.     Julia K Paduano, APRN - CNP

## 2024-07-26 ENCOUNTER — TELEPHONE (OUTPATIENT)
Age: 64
End: 2024-07-26

## 2024-07-26 NOTE — TELEPHONE ENCOUNTER
Attempted to relay information from Dr. Magallanes, however pt had a hard time hearing me. I suggested sending the recommendations to his MyChart, he agrees. Done                    Lisa Arciniega MD  [facility] [id number] hours ago (4:55 PM)     BM  Recommend patient stop taking extra medications for isolated mild elevations of BP, runs the risk of overtreatment.  Do not check BP so frequently.  Check it 2-3 days a week, always 2-3 hours after medications.  If > 220/110, go to ER.  Otherwise, just make a note of it and close the diary.  Add guided deep breathing twice daily for 2-3 minutes shown to lower BP as much as some medications.  Low salt diet.  (He has a history of severe anxiety).  After 3-4 weeks, upload diary to My Chart.

## 2024-07-26 NOTE — TELEPHONE ENCOUNTER
----- Message from Lisa Arciniega MD sent at 7/25/2024  4:52 PM EDT -----  Regarding: FW: BP has been getting high since 7/9/24  Contact: [phone]        ----- Message -----  From: Nely Lau MA  Sent: 7/25/2024  12:58 PM EDT  To: Lisa Arciniega MD  Subject: FW: BP has been getting high since 7/9/24          ----- Message -----  From: Epifanio Ram MA  Sent: 7/25/2024   9:57 AM EDT  To: Nely Lau MA  Subject: FW: BP has been getting high since 7/9/24          ----- Message -----  From: Emery Ram III \"Chip\"  Sent: 7/24/2024   8:33 PM EDT  To: [facility] Clinical Staff  Subject: BP has been getting high since 7/9/24            Dr. Magallanes, since 7/9, my BP has been getting up to as high as 145/97, mostly at night, but also has in the early afternoon as well. It has done this the last two consecutive evenings, so I thought it would be best to contact you for guidance. I have been taking an extra 1/2 of a 25mg metoprolol since the 9th, but last night I did take a whole one since it got up to 145/97. It has usually come down after a couple of hours. My HR has not been getting too high during these times, but my resting heart rate has gone up from about 62 to 68 as of today. I have not been feeling as good lately either. I have not been over-exercising at all and I have been drinking plenty of water. Could I come in to see you? I only have my 6-month appointment with you until September 5th. Your help be greatly appreciated. Thank you, Chip

## 2024-07-30 RX ORDER — APIXABAN 5 MG/1
TABLET, FILM COATED ORAL
Qty: 180 TABLET | Refills: 3 | Status: SHIPPED | OUTPATIENT
Start: 2024-07-30

## 2024-07-31 ENCOUNTER — OFFICE VISIT (OUTPATIENT)
Dept: INTERNAL MEDICINE CLINIC | Facility: CLINIC | Age: 64
End: 2024-07-31
Payer: MEDICARE

## 2024-07-31 VITALS
HEIGHT: 69 IN | TEMPERATURE: 97.1 F | SYSTOLIC BLOOD PRESSURE: 132 MMHG | BODY MASS INDEX: 23.46 KG/M2 | OXYGEN SATURATION: 98 % | HEART RATE: 75 BPM | WEIGHT: 158.4 LBS | DIASTOLIC BLOOD PRESSURE: 74 MMHG

## 2024-07-31 DIAGNOSIS — R00.0 TACHYCARDIA: Primary | ICD-10-CM

## 2024-07-31 DIAGNOSIS — B37.2 CANDIDAL DERMATITIS: ICD-10-CM

## 2024-07-31 DIAGNOSIS — I48.0 PAROXYSMAL ATRIAL FIBRILLATION (HCC): Chronic | ICD-10-CM

## 2024-07-31 DIAGNOSIS — Z98.890 S/P MVR (MITRAL VALVE REPAIR): ICD-10-CM

## 2024-07-31 PROCEDURE — G8420 CALC BMI NORM PARAMETERS: HCPCS | Performed by: NURSE PRACTITIONER

## 2024-07-31 PROCEDURE — 93000 ELECTROCARDIOGRAM COMPLETE: CPT | Performed by: NURSE PRACTITIONER

## 2024-07-31 PROCEDURE — G8427 DOCREV CUR MEDS BY ELIG CLIN: HCPCS | Performed by: NURSE PRACTITIONER

## 2024-07-31 PROCEDURE — 3017F COLORECTAL CA SCREEN DOC REV: CPT | Performed by: NURSE PRACTITIONER

## 2024-07-31 PROCEDURE — 1036F TOBACCO NON-USER: CPT | Performed by: NURSE PRACTITIONER

## 2024-07-31 PROCEDURE — 99215 OFFICE O/P EST HI 40 MIN: CPT | Performed by: NURSE PRACTITIONER

## 2024-07-31 ASSESSMENT — PATIENT HEALTH QUESTIONNAIRE - PHQ9
SUM OF ALL RESPONSES TO PHQ9 QUESTIONS 1 & 2: 0
SUM OF ALL RESPONSES TO PHQ QUESTIONS 1-9: 0
2. FEELING DOWN, DEPRESSED OR HOPELESS: NOT AT ALL
SUM OF ALL RESPONSES TO PHQ QUESTIONS 1-9: 0
1. LITTLE INTEREST OR PLEASURE IN DOING THINGS: NOT AT ALL
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
## 2024-08-12 RX ORDER — HYDROCORTISONE ACETATE 25 MG/1
25 SUPPOSITORY RECTAL 2 TIMES DAILY
Qty: 12 SUPPOSITORY | Refills: 1 | Status: SHIPPED | OUTPATIENT
Start: 2024-08-12

## 2024-08-12 NOTE — TELEPHONE ENCOUNTER
Requested Prescriptions     Pending Prescriptions Disp Refills    hydrocortisone (ANUSOL-HC) 25 MG suppository 12 suppository 1     Sig: Place 1 suppository rectally 2 times daily

## 2024-08-21 ENCOUNTER — PATIENT MESSAGE (OUTPATIENT)
Dept: INTERNAL MEDICINE CLINIC | Facility: CLINIC | Age: 64
End: 2024-08-21

## 2024-08-21 RX ORDER — HYDROCORTISONE ACETATE 25 MG/1
25 SUPPOSITORY RECTAL 2 TIMES DAILY
Qty: 12 SUPPOSITORY | Refills: 1 | Status: SHIPPED | OUTPATIENT
Start: 2024-08-21

## 2024-08-26 ENCOUNTER — OFFICE VISIT (OUTPATIENT)
Dept: INTERNAL MEDICINE CLINIC | Facility: CLINIC | Age: 64
End: 2024-08-26
Payer: MEDICARE

## 2024-08-26 VITALS
WEIGHT: 156.4 LBS | OXYGEN SATURATION: 98 % | SYSTOLIC BLOOD PRESSURE: 130 MMHG | HEIGHT: 69 IN | DIASTOLIC BLOOD PRESSURE: 84 MMHG | BODY MASS INDEX: 23.16 KG/M2 | HEART RATE: 82 BPM | TEMPERATURE: 97.4 F

## 2024-08-26 DIAGNOSIS — I48.0 PAROXYSMAL ATRIAL FIBRILLATION (HCC): Chronic | ICD-10-CM

## 2024-08-26 DIAGNOSIS — Z98.890 S/P MVR (MITRAL VALVE REPAIR): ICD-10-CM

## 2024-08-26 DIAGNOSIS — R23.2 FACIAL FLUSHING: ICD-10-CM

## 2024-08-26 DIAGNOSIS — R63.4 WEIGHT LOSS: Primary | ICD-10-CM

## 2024-08-26 DIAGNOSIS — K64.9 HEMORRHOIDS, UNSPECIFIED HEMORRHOID TYPE: Chronic | ICD-10-CM

## 2024-08-26 LAB
ALBUMIN SERPL-MCNC: 4.4 G/DL (ref 3.2–4.6)
ALBUMIN/GLOB SERPL: 1.6 (ref 1–1.9)
ALP SERPL-CCNC: 69 U/L (ref 40–129)
ALT SERPL-CCNC: 31 U/L (ref 12–65)
ANION GAP SERPL CALC-SCNC: 12 MMOL/L (ref 9–18)
AST SERPL-CCNC: 32 U/L (ref 15–37)
BASOPHILS # BLD: 0.1 K/UL (ref 0–0.2)
BASOPHILS NFR BLD: 2 % (ref 0–2)
BILIRUB SERPL-MCNC: 0.5 MG/DL (ref 0–1.2)
BUN SERPL-MCNC: 22 MG/DL (ref 8–23)
CALCIUM SERPL-MCNC: 9.5 MG/DL (ref 8.8–10.2)
CHLORIDE SERPL-SCNC: 107 MMOL/L (ref 98–107)
CO2 SERPL-SCNC: 23 MMOL/L (ref 20–28)
CREAT SERPL-MCNC: 0.85 MG/DL (ref 0.8–1.3)
DIFFERENTIAL METHOD BLD: NORMAL
EOSINOPHIL # BLD: 0.1 K/UL (ref 0–0.8)
EOSINOPHIL NFR BLD: 1 % (ref 0.5–7.8)
ERYTHROCYTE [DISTWIDTH] IN BLOOD BY AUTOMATED COUNT: 12.8 % (ref 11.9–14.6)
ERYTHROCYTE [SEDIMENTATION RATE] IN BLOOD: 2 MM/HR
GLOBULIN SER CALC-MCNC: 2.8 G/DL (ref 2.3–3.5)
GLUCOSE SERPL-MCNC: 84 MG/DL (ref 70–99)
HCT VFR BLD AUTO: 48.9 % (ref 41.1–50.3)
HGB BLD-MCNC: 16.9 G/DL (ref 13.6–17.2)
IMM GRANULOCYTES # BLD AUTO: 0 K/UL (ref 0–0.5)
IMM GRANULOCYTES NFR BLD AUTO: 0 % (ref 0–5)
LYMPHOCYTES # BLD: 1.5 K/UL (ref 0.5–4.6)
LYMPHOCYTES NFR BLD: 24 % (ref 13–44)
MAGNESIUM SERPL-MCNC: 2.3 MG/DL (ref 1.8–2.4)
MCH RBC QN AUTO: 30.5 PG (ref 26.1–32.9)
MCHC RBC AUTO-ENTMCNC: 34.6 G/DL (ref 31.4–35)
MCV RBC AUTO: 88.3 FL (ref 82–102)
MONOCYTES # BLD: 0.4 K/UL (ref 0.1–1.3)
MONOCYTES NFR BLD: 7 % (ref 4–12)
NEUTS SEG # BLD: 4.2 K/UL (ref 1.7–8.2)
NEUTS SEG NFR BLD: 66 % (ref 43–78)
NRBC # BLD: 0 K/UL (ref 0–0.2)
PLATELET # BLD AUTO: 244 K/UL (ref 150–450)
PMV BLD AUTO: 9.6 FL (ref 9.4–12.3)
POTASSIUM SERPL-SCNC: 4.2 MMOL/L (ref 3.5–5.1)
PROT SERPL-MCNC: 7.3 G/DL (ref 6.3–8.2)
RBC # BLD AUTO: 5.54 M/UL (ref 4.23–5.6)
SODIUM SERPL-SCNC: 142 MMOL/L (ref 136–145)
TSH W FREE THYROID IF ABNORMAL: 2.98 UIU/ML (ref 0.27–4.2)
WBC # BLD AUTO: 6.2 K/UL (ref 4.3–11.1)

## 2024-08-26 PROCEDURE — 1036F TOBACCO NON-USER: CPT | Performed by: NURSE PRACTITIONER

## 2024-08-26 PROCEDURE — 99214 OFFICE O/P EST MOD 30 MIN: CPT | Performed by: NURSE PRACTITIONER

## 2024-08-26 PROCEDURE — G8427 DOCREV CUR MEDS BY ELIG CLIN: HCPCS | Performed by: NURSE PRACTITIONER

## 2024-08-26 PROCEDURE — 3017F COLORECTAL CA SCREEN DOC REV: CPT | Performed by: NURSE PRACTITIONER

## 2024-08-26 PROCEDURE — G8420 CALC BMI NORM PARAMETERS: HCPCS | Performed by: NURSE PRACTITIONER

## 2024-08-26 PROCEDURE — 36415 COLL VENOUS BLD VENIPUNCTURE: CPT | Performed by: NURSE PRACTITIONER

## 2024-08-26 NOTE — PROGRESS NOTES
Emery Ram III (: 1960) presents today c/o hemorrhoids since May.  He has been using topical Anusol HC cream.  He states since , they have been pretty consistent.  He was prescribed Diflucan for rash around the anus 2024. On 2024, he had an incident after exercising on the treadmill. A few weeks ago, he went to the rec department and jogged on the treadmill (note: he is an avid, lifelong runner).  During the exercise, his heart rate stayed about 120's but he did not feel \"good\".  An hour later, his heart rate was 108. /97.  He increased his water intake and the following day felt better.  This week, however, he started feeling flushed and his face turned red. /95. He states \"I have not been feeling real good.\"  Denies chest pain.  He wears smart watch which has note noted any arrhythmias.     He ran outdoors last week due to the cooler weather. On Thursday, when running he felt off.  The following day, he felt \"unwell\" all day - overwhelming fatigue, \"drained\". No chest pain, palpitations. His resting heart rate has gone from 62 to 70 since July.  EKG on 2024: Sinus Rhythm ; incomplete right bundle branch block and anterior fascicular block.    He feels like his face/ears get \"fire hot\" intermittently without known cause/trigger. Denies sweating; no night sweats.    No known family hx/o auto-immune in parents; does have aunt with arteritis.    He check his blood pressure once daily a few hours after morning meds - metoprolol, Eliquis.  (112/70 on average). He takes 2mg Ativan Qhs (instead of 1mg BID).     Chief Complaint   Patient presents with    Hemorrhoids    Fatigue     Patient Active Problem List   Diagnosis    Mitral valve prolapse    Anxiety    Atrial fibrillation with RVR (HCC)    Anticoagulated    First degree AV block    Nonischemic cardiomyopathy (HCC)    Insomnia    Non-rheumatic mitral regurgitation    Mobitz (type) I (Wenckebach's) atrioventricular

## 2024-08-27 LAB
ANA SER QL: NEGATIVE
CCP IGA+IGG SERPL IA-ACNC: 6 UNITS (ref 0–19)
CRP SERPL-MCNC: <1 MG/L (ref 0–10)
DRVVT RATIO: 1.1 RATIO (ref 0.8–1.2)
LA 2 SCREEN W REFLEX-IMP: ABNORMAL
MIXING DRVVT: 45 SEC (ref 0–40.4)
RHEUMATOID FACT SER QL LA: NEGATIVE
SCREEN APTT: 37.5 SEC (ref 0–43.5)
SCREEN DRVVT: 59.2 SEC (ref 0–47)

## 2024-09-09 ENCOUNTER — OFFICE VISIT (OUTPATIENT)
Dept: ENT CLINIC | Age: 64
End: 2024-09-09
Payer: MEDICARE

## 2024-09-09 ENCOUNTER — PATIENT MESSAGE (OUTPATIENT)
Dept: INTERNAL MEDICINE CLINIC | Facility: CLINIC | Age: 64
End: 2024-09-09

## 2024-09-09 VITALS — HEIGHT: 69 IN | RESPIRATION RATE: 16 BRPM | BODY MASS INDEX: 23.11 KG/M2 | WEIGHT: 156 LBS

## 2024-09-09 DIAGNOSIS — J30.9 ALLERGIC RHINITIS, UNSPECIFIED SEASONALITY, UNSPECIFIED TRIGGER: Primary | ICD-10-CM

## 2024-09-09 DIAGNOSIS — J32.0 CHRONIC MAXILLARY SINUSITIS: ICD-10-CM

## 2024-09-09 PROCEDURE — 1036F TOBACCO NON-USER: CPT | Performed by: STUDENT IN AN ORGANIZED HEALTH CARE EDUCATION/TRAINING PROGRAM

## 2024-09-09 PROCEDURE — 99214 OFFICE O/P EST MOD 30 MIN: CPT | Performed by: STUDENT IN AN ORGANIZED HEALTH CARE EDUCATION/TRAINING PROGRAM

## 2024-09-09 PROCEDURE — G8427 DOCREV CUR MEDS BY ELIG CLIN: HCPCS | Performed by: STUDENT IN AN ORGANIZED HEALTH CARE EDUCATION/TRAINING PROGRAM

## 2024-09-09 PROCEDURE — 31231 NASAL ENDOSCOPY DX: CPT | Performed by: STUDENT IN AN ORGANIZED HEALTH CARE EDUCATION/TRAINING PROGRAM

## 2024-09-09 PROCEDURE — G8420 CALC BMI NORM PARAMETERS: HCPCS | Performed by: STUDENT IN AN ORGANIZED HEALTH CARE EDUCATION/TRAINING PROGRAM

## 2024-09-09 PROCEDURE — 3017F COLORECTAL CA SCREEN DOC REV: CPT | Performed by: STUDENT IN AN ORGANIZED HEALTH CARE EDUCATION/TRAINING PROGRAM

## 2024-09-09 ASSESSMENT — ENCOUNTER SYMPTOMS
SHORTNESS OF BREATH: 0
SINUS PAIN: 0
FACIAL SWELLING: 0
COUGH: 0
DIARRHEA: 0
EYE DISCHARGE: 0
STRIDOR: 0
SINUS PRESSURE: 0
EYE PAIN: 0
WHEEZING: 0
CONSTIPATION: 0
NAUSEA: 0
EYE ITCHING: 0
APNEA: 0
CHOKING: 0

## 2024-09-10 RX ORDER — HYDROCORTISONE ACETATE 25 MG/1
25 SUPPOSITORY RECTAL 2 TIMES DAILY
Qty: 12 SUPPOSITORY | Refills: 1 | Status: SHIPPED | OUTPATIENT
Start: 2024-09-10

## 2024-09-23 ENCOUNTER — OFFICE VISIT (OUTPATIENT)
Age: 64
End: 2024-09-23

## 2024-09-23 VITALS
WEIGHT: 150.6 LBS | HEIGHT: 69 IN | DIASTOLIC BLOOD PRESSURE: 78 MMHG | SYSTOLIC BLOOD PRESSURE: 120 MMHG | HEART RATE: 76 BPM | BODY MASS INDEX: 22.31 KG/M2

## 2024-09-23 DIAGNOSIS — Z98.890 S/P MVR (MITRAL VALVE REPAIR): Primary | ICD-10-CM

## 2024-09-23 DIAGNOSIS — R01.1 HEART MURMUR: ICD-10-CM

## 2024-09-23 DIAGNOSIS — I50.22 HEART FAILURE WITH MILDLY REDUCED EJECTION FRACTION (HFMREF) (HCC): ICD-10-CM

## 2024-09-23 DIAGNOSIS — I48.0 PAROXYSMAL ATRIAL FIBRILLATION (HCC): ICD-10-CM

## 2024-09-23 DIAGNOSIS — Z79.01 ANTICOAGULATED: ICD-10-CM

## 2024-09-23 ASSESSMENT — ENCOUNTER SYMPTOMS
COUGH: 0
APHONIA: 0
EYE PAIN: 0
ABDOMINAL PAIN: 0
STRIDOR: 0
NAIL CHANGES: 0

## 2024-10-31 RX ORDER — HYDROCORTISONE ACETATE 25 MG/1
25 SUPPOSITORY RECTAL 2 TIMES DAILY
Qty: 24 SUPPOSITORY | Refills: 1 | Status: SHIPPED | OUTPATIENT
Start: 2024-10-31

## 2024-11-14 ENCOUNTER — PATIENT MESSAGE (OUTPATIENT)
Dept: INTERNAL MEDICINE CLINIC | Facility: CLINIC | Age: 64
End: 2024-11-14

## 2024-11-14 RX ORDER — MUPIROCIN 20 MG/G
OINTMENT TOPICAL
Qty: 30 G | Refills: 5 | Status: SHIPPED | OUTPATIENT
Start: 2024-11-14

## 2024-11-15 NOTE — TELEPHONE ENCOUNTER
Bactroban ointment as prescribed; cleanse with soap and water.    OV w/o improvement.    Julia S Paduano, JENNIFER - CNP

## 2024-12-20 ENCOUNTER — OFFICE VISIT (OUTPATIENT)
Dept: INTERNAL MEDICINE CLINIC | Facility: CLINIC | Age: 64
End: 2024-12-20

## 2024-12-20 VITALS
HEIGHT: 69 IN | SYSTOLIC BLOOD PRESSURE: 118 MMHG | DIASTOLIC BLOOD PRESSURE: 72 MMHG | BODY MASS INDEX: 22.9 KG/M2 | WEIGHT: 154.6 LBS

## 2024-12-20 DIAGNOSIS — G47.01 INSOMNIA DUE TO MEDICAL CONDITION: ICD-10-CM

## 2024-12-20 DIAGNOSIS — F41.9 ANXIETY: ICD-10-CM

## 2024-12-20 DIAGNOSIS — I50.22 CHRONIC SYSTOLIC (CONGESTIVE) HEART FAILURE (HCC): Primary | ICD-10-CM

## 2024-12-20 PROBLEM — I48.91 ATRIAL FIBRILLATION WITH RVR (HCC): Status: RESOLVED | Noted: 2019-08-24 | Resolved: 2024-12-20

## 2024-12-20 RX ORDER — KETOCONAZOLE 20 MG/ML
SHAMPOO, SUSPENSION TOPICAL DAILY PRN
COMMUNITY
Start: 2024-12-18

## 2024-12-20 ASSESSMENT — ENCOUNTER SYMPTOMS: SHORTNESS OF BREATH: 0

## 2024-12-20 NOTE — PROGRESS NOTES
HPI: Emery Ram III (: 1960)    Follow up    Had labs recently    Problem List:  Patient Active Problem List   Diagnosis   • Mitral valve prolapse   • Anxiety   • Anticoagulated   • First degree AV block   • Nonischemic cardiomyopathy (HCC)   • Insomnia   • Non-rheumatic mitral regurgitation   • Mobitz (type) I (Wenckebach's) atrioventricular block   • Major depressive disorder, single episode, mild (HCC)   • Paroxysmal atrial fibrillation (HCC)   • S/P MVR (mitral valve repair)   • Mitral valve regurgitation   • Chronic systolic (congestive) heart failure       History:  Past Medical History:   Diagnosis Date   • Allergic rhinitis due to other allergen 2015   • Anticoagulated 4/3/2018   • Anxiety state, unspecified 2015   • CAD (coronary artery disease)     mild non obstructive , awaitng Maze for mitral valve repair/replacement   • Depression     started with loss of parents    • First degree hemorrhoids 2017   • Insomnia, unspecified 2015   • Lipoma 2015   • Mitral valve regurgitation    • Paroxysmal A-fib (HCC)         • Unspecified adjustment reaction 2015       Allergies:  Allergies   Allergen Reactions   • Dust Mite Extract    • Molds & Smuts        Current Medications:  Current Outpatient Medications   Medication Sig Dispense Refill   • ketoconazole (NIZORAL) 2 % shampoo Apply topically daily as needed     • mupirocin (BACTROBAN) 2 % ointment Apply topically 3 times daily. 30 g 5   • hydrocortisone (ANUSOL-HC) 25 MG suppository Place 1 suppository rectally 2 times daily 24 suppository 1   • ELIQUIS 5 MG TABS tablet TAKE 1 TABLET TWICE A DAY FOR TREATMENT TO PREVENT BLOOD CLOTS IN CHRONIC ATRIAL FIBRILLATION 180 tablet 3   • aspirin 81 MG EC tablet Take 1 tablet by mouth daily TAKE 1 TABLET BY MOUTH DAILY 90 tablet 3   • metoprolol succinate (TOPROL XL) 25 MG extended release tablet Take 1 tablet by mouth daily 90 tablet 3   • hydrocortisone (ANUSOL-HC)

## 2025-02-03 ENCOUNTER — PATIENT MESSAGE (OUTPATIENT)
Dept: INTERNAL MEDICINE CLINIC | Facility: CLINIC | Age: 65
End: 2025-02-03

## 2025-02-03 NOTE — TELEPHONE ENCOUNTER
Patient called with stomach bloating and cramps. He has had some issues with constipation as he is having to force to have BM. He was taking 3/4 dose of Miralax daily. Thought this may be causing issues with his stomach so he discontinued and his stomach did feel better. Tried Colace for three days and developed bleeding with hemorrhoids. Patient notifed per CJB to switch to daily colace and ok to use Anusol.  He is also scheduled to see proctologist 2/17/25.

## 2025-02-06 ENCOUNTER — OFFICE VISIT (OUTPATIENT)
Dept: INTERNAL MEDICINE CLINIC | Facility: CLINIC | Age: 65
End: 2025-02-06

## 2025-02-06 VITALS
WEIGHT: 153.6 LBS | HEIGHT: 69 IN | DIASTOLIC BLOOD PRESSURE: 80 MMHG | SYSTOLIC BLOOD PRESSURE: 120 MMHG | BODY MASS INDEX: 22.75 KG/M2

## 2025-02-06 DIAGNOSIS — K62.5 RECTAL BLEEDING: ICD-10-CM

## 2025-02-06 DIAGNOSIS — I48.0 PAROXYSMAL ATRIAL FIBRILLATION (HCC): ICD-10-CM

## 2025-02-06 DIAGNOSIS — K64.9 HEMORRHOIDS, UNSPECIFIED HEMORRHOID TYPE: ICD-10-CM

## 2025-02-06 DIAGNOSIS — Z00.00 MEDICARE ANNUAL WELLNESS VISIT, SUBSEQUENT: ICD-10-CM

## 2025-02-06 DIAGNOSIS — K59.00 CONSTIPATION, UNSPECIFIED CONSTIPATION TYPE: Primary | ICD-10-CM

## 2025-02-06 DIAGNOSIS — I50.22 CHRONIC SYSTOLIC (CONGESTIVE) HEART FAILURE (HCC): ICD-10-CM

## 2025-02-06 LAB
ALBUMIN SERPL-MCNC: 4.6 G/DL (ref 3.2–4.6)
ALBUMIN/GLOB SERPL: 1.7 (ref 1–1.9)
ALP SERPL-CCNC: 70 U/L (ref 40–129)
ALT SERPL-CCNC: 32 U/L (ref 8–55)
ANION GAP SERPL CALC-SCNC: 14 MMOL/L (ref 7–16)
AST SERPL-CCNC: 38 U/L (ref 15–37)
BASOPHILS # BLD: 0.05 K/UL (ref 0–0.2)
BASOPHILS NFR BLD: 0.9 % (ref 0–2)
BILIRUB SERPL-MCNC: 0.6 MG/DL (ref 0–1.2)
BUN SERPL-MCNC: 15 MG/DL (ref 8–23)
CALCIUM SERPL-MCNC: 9.8 MG/DL (ref 8.8–10.2)
CHLORIDE SERPL-SCNC: 106 MMOL/L (ref 98–107)
CO2 SERPL-SCNC: 23 MMOL/L (ref 20–29)
CREAT SERPL-MCNC: 0.8 MG/DL (ref 0.8–1.3)
DIFFERENTIAL METHOD BLD: ABNORMAL
EOSINOPHIL # BLD: 0.04 K/UL (ref 0–0.8)
EOSINOPHIL NFR BLD: 0.7 % (ref 0.5–7.8)
ERYTHROCYTE [DISTWIDTH] IN BLOOD BY AUTOMATED COUNT: 12.6 % (ref 11.9–14.6)
GLOBULIN SER CALC-MCNC: 2.8 G/DL (ref 2.3–3.5)
GLUCOSE SERPL-MCNC: 81 MG/DL (ref 70–99)
HCT VFR BLD AUTO: 49.8 % (ref 41.1–50.3)
HGB BLD-MCNC: 17 G/DL (ref 13.6–17.2)
IMM GRANULOCYTES # BLD AUTO: 0.03 K/UL (ref 0–0.5)
IMM GRANULOCYTES NFR BLD AUTO: 0.5 % (ref 0–5)
LYMPHOCYTES # BLD: 1.41 K/UL (ref 0.5–4.6)
LYMPHOCYTES NFR BLD: 24.6 % (ref 13–44)
MCH RBC QN AUTO: 30.2 PG (ref 26.1–32.9)
MCHC RBC AUTO-ENTMCNC: 34.1 G/DL (ref 31.4–35)
MCV RBC AUTO: 88.6 FL (ref 82–102)
MONOCYTES # BLD: 0.46 K/UL (ref 0.1–1.3)
MONOCYTES NFR BLD: 8 % (ref 4–12)
NEUTS SEG # BLD: 3.75 K/UL (ref 1.7–8.2)
NEUTS SEG NFR BLD: 65.3 % (ref 43–78)
NRBC # BLD: 0 K/UL (ref 0–0.2)
PLATELET # BLD AUTO: 246 K/UL (ref 150–450)
PMV BLD AUTO: 9.5 FL (ref 9.4–12.3)
POTASSIUM SERPL-SCNC: 4.4 MMOL/L (ref 3.5–5.1)
PROT SERPL-MCNC: 7.5 G/DL (ref 6.3–8.2)
RBC # BLD AUTO: 5.62 M/UL (ref 4.23–5.6)
SODIUM SERPL-SCNC: 143 MMOL/L (ref 136–145)
WBC # BLD AUTO: 5.7 K/UL (ref 4.3–11.1)

## 2025-02-06 RX ORDER — FEXOFENADINE HCL 60 MG/1
60 TABLET, FILM COATED ORAL DAILY
COMMUNITY

## 2025-02-06 SDOH — ECONOMIC STABILITY: FOOD INSECURITY: WITHIN THE PAST 12 MONTHS, THE FOOD YOU BOUGHT JUST DIDN'T LAST AND YOU DIDN'T HAVE MONEY TO GET MORE.: NEVER TRUE

## 2025-02-06 SDOH — ECONOMIC STABILITY: FOOD INSECURITY: WITHIN THE PAST 12 MONTHS, YOU WORRIED THAT YOUR FOOD WOULD RUN OUT BEFORE YOU GOT MONEY TO BUY MORE.: NEVER TRUE

## 2025-02-06 ASSESSMENT — PATIENT HEALTH QUESTIONNAIRE - PHQ9
SUM OF ALL RESPONSES TO PHQ9 QUESTIONS 1 & 2: 0
10. IF YOU CHECKED OFF ANY PROBLEMS, HOW DIFFICULT HAVE THESE PROBLEMS MADE IT FOR YOU TO DO YOUR WORK, TAKE CARE OF THINGS AT HOME, OR GET ALONG WITH OTHER PEOPLE: NOT DIFFICULT AT ALL
SUM OF ALL RESPONSES TO PHQ QUESTIONS 1-9: 2
5. POOR APPETITE OR OVEREATING: NOT AT ALL
SUM OF ALL RESPONSES TO PHQ QUESTIONS 1-9: 0
8. MOVING OR SPEAKING SO SLOWLY THAT OTHER PEOPLE COULD HAVE NOTICED. OR THE OPPOSITE, BEING SO FIGETY OR RESTLESS THAT YOU HAVE BEEN MOVING AROUND A LOT MORE THAN USUAL: NOT AT ALL
5. POOR APPETITE OR OVEREATING: NOT AT ALL
SUM OF ALL RESPONSES TO PHQ QUESTIONS 1-9: 0
10. IF YOU CHECKED OFF ANY PROBLEMS, HOW DIFFICULT HAVE THESE PROBLEMS MADE IT FOR YOU TO DO YOUR WORK, TAKE CARE OF THINGS AT HOME, OR GET ALONG WITH OTHER PEOPLE: NOT DIFFICULT AT ALL
SUM OF ALL RESPONSES TO PHQ9 QUESTIONS 1 & 2: 1
SUM OF ALL RESPONSES TO PHQ QUESTIONS 1-9: 0
2. FEELING DOWN, DEPRESSED OR HOPELESS: NOT AT ALL
SUM OF ALL RESPONSES TO PHQ QUESTIONS 1-9: 2
SUM OF ALL RESPONSES TO PHQ QUESTIONS 1-9: 2
7. TROUBLE CONCENTRATING ON THINGS, SUCH AS READING THE NEWSPAPER OR WATCHING TELEVISION: NOT AT ALL
3. TROUBLE FALLING OR STAYING ASLEEP: NOT AT ALL
SUM OF ALL RESPONSES TO PHQ QUESTIONS 1-9: 2
9. THOUGHTS THAT YOU WOULD BE BETTER OFF DEAD, OR OF HURTING YOURSELF: NOT AT ALL
9. THOUGHTS THAT YOU WOULD BE BETTER OFF DEAD, OR OF HURTING YOURSELF: NOT AT ALL
6. FEELING BAD ABOUT YOURSELF - OR THAT YOU ARE A FAILURE OR HAVE LET YOURSELF OR YOUR FAMILY DOWN: NOT AT ALL
1. LITTLE INTEREST OR PLEASURE IN DOING THINGS: SEVERAL DAYS
4. FEELING TIRED OR HAVING LITTLE ENERGY: NOT AT ALL
1. LITTLE INTEREST OR PLEASURE IN DOING THINGS: NOT AT ALL
3. TROUBLE FALLING OR STAYING ASLEEP: NOT AT ALL
6. FEELING BAD ABOUT YOURSELF - OR THAT YOU ARE A FAILURE OR HAVE LET YOURSELF OR YOUR FAMILY DOWN: NOT AT ALL
7. TROUBLE CONCENTRATING ON THINGS, SUCH AS READING THE NEWSPAPER OR WATCHING TELEVISION: NOT AT ALL
8. MOVING OR SPEAKING SO SLOWLY THAT OTHER PEOPLE COULD HAVE NOTICED. OR THE OPPOSITE, BEING SO FIGETY OR RESTLESS THAT YOU HAVE BEEN MOVING AROUND A LOT MORE THAN USUAL: NOT AT ALL
SUM OF ALL RESPONSES TO PHQ QUESTIONS 1-9: 0
2. FEELING DOWN, DEPRESSED OR HOPELESS: NOT AT ALL
4. FEELING TIRED OR HAVING LITTLE ENERGY: SEVERAL DAYS

## 2025-02-06 NOTE — PROGRESS NOTES
Emery Ram III (: 1960)     History of Present Illness  The patient presents for evaluation of constipation and hemorrhoids.    He has been experiencing persistent abdominal discomfort since the day before , characterized by gas and bloating. Initially, he attributed these symptoms to a potential infection acquired during a colonoscopy visit at [facility]. Despite a brief period of symptom relief on Ami Day, the discomfort recurred after resuming his normal diet the following Saturday. He describes the sensation as deep and akin to hunger, which has led to a decrease in his food intake. He reports no heartburn but admits to occasional burping. He has been consuming a high-fiber diet, including dried beans daily, and supplementing with MiraLAX. However, he discontinued MiraLAX on 2024 due to prolonged stomach discomfort and switched to Colace. After three days of Colace, he experienced an improvement in his symptoms, but also noticed blood in his stool, a new development for him. He resumed MiraLAX but has not felt the urge to defecate in the mornings since the previous . He does not believe he is currently constipated but reports having to exert significant effort during bowel movements. He has been using Gas-X and is considering milk of magnesia for his symptoms.    He has been dealing with hemorrhoids and has been incorporating more fiber into his diet. He has been using hydrocortisone suppositories for his hemorrhoids. He has been postponing a scheduled colonoscopy due to his use of Eliquis and concerns about potential bleeding during the procedure. He has a follow-up appointment with Dr. Vargas on 03/15/2025.    MEDICATIONS  Current: MiraLAX, Colace, Eliquis, Anusol suppositories, hydrocortisone, Gas-X      Chief Complaint   Patient presents with    Follow-up     Constipation, gas, and bloated.     Medicare AWV     Patient Active Problem List   Diagnosis

## 2025-02-10 ENCOUNTER — HOSPITAL ENCOUNTER (OUTPATIENT)
Dept: ULTRASOUND IMAGING | Age: 65
Discharge: HOME | End: 2025-02-12
Payer: MEDICARE

## 2025-02-10 DIAGNOSIS — K62.5 RECTAL BLEEDING: ICD-10-CM

## 2025-02-10 DIAGNOSIS — K59.00 CONSTIPATION, UNSPECIFIED CONSTIPATION TYPE: ICD-10-CM

## 2025-02-10 PROCEDURE — 76700 US EXAM ABDOM COMPLETE: CPT

## 2025-02-24 ENCOUNTER — TELEPHONE (OUTPATIENT)
Age: 65
End: 2025-02-24

## 2025-02-24 NOTE — TELEPHONE ENCOUNTER
Cardiac Clearance        Physician or Practice Requesting:Dr janay Wilkinson   : office   Contact Phone Number: [phone]  Fax Number: [phone]  Date of Surgery/Procedure: 04/25/25  Type of Surgery or Procedure: colonoscopy   Type of Anesthesia: general  Type of Clearance Requested: Cardiac Clearance and Medication Hold  Medication to Hold:Eliquis   Days to Hold: 2 days

## 2025-03-24 ENCOUNTER — OFFICE VISIT (OUTPATIENT)
Age: 65
End: 2025-03-24
Payer: MEDICARE

## 2025-03-24 VITALS
HEART RATE: 77 BPM | WEIGHT: 161 LBS | SYSTOLIC BLOOD PRESSURE: 120 MMHG | DIASTOLIC BLOOD PRESSURE: 80 MMHG | BODY MASS INDEX: 23.78 KG/M2

## 2025-03-24 DIAGNOSIS — I48.0 PAROXYSMAL ATRIAL FIBRILLATION (HCC): ICD-10-CM

## 2025-03-24 DIAGNOSIS — I50.22 HEART FAILURE WITH MILDLY REDUCED EJECTION FRACTION (HFMREF) (HCC): ICD-10-CM

## 2025-03-24 DIAGNOSIS — Z98.890 H/O MITRAL VALVE REPAIR: Primary | ICD-10-CM

## 2025-03-24 PROCEDURE — G8420 CALC BMI NORM PARAMETERS: HCPCS | Performed by: INTERNAL MEDICINE

## 2025-03-24 PROCEDURE — 1036F TOBACCO NON-USER: CPT | Performed by: INTERNAL MEDICINE

## 2025-03-24 PROCEDURE — 99214 OFFICE O/P EST MOD 30 MIN: CPT | Performed by: INTERNAL MEDICINE

## 2025-03-24 PROCEDURE — 93000 ELECTROCARDIOGRAM COMPLETE: CPT | Performed by: INTERNAL MEDICINE

## 2025-03-24 PROCEDURE — G8428 CUR MEDS NOT DOCUMENT: HCPCS | Performed by: INTERNAL MEDICINE

## 2025-03-24 PROCEDURE — 3017F COLORECTAL CA SCREEN DOC REV: CPT | Performed by: INTERNAL MEDICINE

## 2025-03-24 RX ORDER — ASPIRIN 81 MG/1
81 TABLET ORAL DAILY
Qty: 90 TABLET | Refills: 3 | Status: SHIPPED | OUTPATIENT
Start: 2025-03-24

## 2025-03-24 RX ORDER — METOPROLOL SUCCINATE 25 MG/1
25 TABLET, EXTENDED RELEASE ORAL DAILY
Qty: 90 TABLET | Refills: 3 | Status: SHIPPED | OUTPATIENT
Start: 2025-03-24

## 2025-03-24 NOTE — PROGRESS NOTES
[address], [address]  [place] [address]  PHONE: [phone]    SUBJECTIVE:   Emery Ram III is a 64 y.o. male 1960   seen for a follow up visit regarding the following:     Chief Complaint   Patient presents with    Congestive Heart Failure    Atrial Fibrillation         History of Present Illness  The patient is a 64-year-old male with a history of heart failure with mildly reduced ejection fraction, mitral valve prolapse status post repair, and paroxysmal atrial fibrillation.    The patient reports increased nasal congestion attributed to pollen exposure during outdoor walks, with symptoms particularly severe over the past weekend. He experiences epistaxis when nasal passages are dry, especially at night. Robitussin DM was initiated for symptomatic relief but proved ineffective.    The patient's heart rate is monitored using an Apple watch equipped with electrocardiogram (EKG) capabilities. No symptoms of atrial fibrillation were noted during this episode. Blood pressure is checked monthly in the evenings and has consistently been within normal limits. Yesterday, the patient observed a sudden increase in heart rate from the 60s to the 90s after being awake for approximately one hour, which remained elevated until returning to normal around 1700 hours. The patient suspects that Robitussin DM may have contributed to the tachycardia.    PAST SURGICAL HISTORY:  Mitral valve repair with ring placement in 2019, performed by Dr. Florian.    SOCIAL HISTORY  - Exercise: Walks regularly        Interval history:  Cardiac history:  2/2018  day monitor - NSR with episodes of atrial tachycardia converting to atypical atrial flutter, then to afib and back to sinus rhythm.  Frequently reports fast heart beat and flutter which does correlate with arrhythmia but is also reported during  normal sinus rhythm\"  4/19/18 (Dr Drew) MITRAL VALVE REPAIR with quadrant resection

## 2025-05-28 ENCOUNTER — OFFICE VISIT (OUTPATIENT)
Dept: INTERNAL MEDICINE CLINIC | Facility: CLINIC | Age: 65
End: 2025-05-28
Payer: MEDICARE

## 2025-05-28 VITALS
BODY MASS INDEX: 23.34 KG/M2 | SYSTOLIC BLOOD PRESSURE: 134 MMHG | OXYGEN SATURATION: 98 % | TEMPERATURE: 98.1 F | HEIGHT: 69 IN | HEART RATE: 88 BPM | WEIGHT: 157.6 LBS | DIASTOLIC BLOOD PRESSURE: 70 MMHG

## 2025-05-28 DIAGNOSIS — J01.90 ACUTE RHINOSINUSITIS: Primary | ICD-10-CM

## 2025-05-28 DIAGNOSIS — K64.9 HEMORRHOIDS, UNSPECIFIED HEMORRHOID TYPE: Chronic | ICD-10-CM

## 2025-05-28 DIAGNOSIS — H61.23 BILATERAL IMPACTED CERUMEN: ICD-10-CM

## 2025-05-28 PROCEDURE — 1036F TOBACCO NON-USER: CPT | Performed by: NURSE PRACTITIONER

## 2025-05-28 PROCEDURE — 3017F COLORECTAL CA SCREEN DOC REV: CPT | Performed by: NURSE PRACTITIONER

## 2025-05-28 PROCEDURE — 99214 OFFICE O/P EST MOD 30 MIN: CPT | Performed by: NURSE PRACTITIONER

## 2025-05-28 PROCEDURE — G8420 CALC BMI NORM PARAMETERS: HCPCS | Performed by: NURSE PRACTITIONER

## 2025-05-28 PROCEDURE — G8427 DOCREV CUR MEDS BY ELIG CLIN: HCPCS | Performed by: NURSE PRACTITIONER

## 2025-05-28 PROCEDURE — 69210 REMOVE IMPACTED EAR WAX UNI: CPT | Performed by: NURSE PRACTITIONER

## 2025-05-28 RX ORDER — AZITHROMYCIN 250 MG/1
TABLET, FILM COATED ORAL
Qty: 6 TABLET | Refills: 0 | Status: SHIPPED | OUTPATIENT
Start: 2025-05-28 | End: 2025-06-07

## 2025-05-28 RX ORDER — HYDROCORTISONE ACETATE 25 MG/1
25 SUPPOSITORY RECTAL 2 TIMES DAILY
Qty: 24 SUPPOSITORY | Refills: 1 | Status: SHIPPED | OUTPATIENT
Start: 2025-05-28

## 2025-05-28 NOTE — PROGRESS NOTES
Emery Ram III (: 1960)     History of Present Illness  The patient presents for evaluation of sinus infection and hemorrhoids.    He reports a recent exposure to his grandson, who had a wet cough. Approximately 12 days ago, he began experiencing symptoms of sneezing, itching, and congestion, similar to an episode in 2025. Over the past week, his symptoms have escalated, with increased congestion and the production of yellow-green nasal discharge, which is particularly severe in the mornings. He also reports occasional bloody discharge and a sore nose from frequent blowing. He has been using NeilMed Sinus Rinse, which previously alleviated his symptoms within a couple of weeks. He expresses reluctance to use antibiotics unless absolutely necessary, having last used them in 2024. Over the past 4 to 5 days, he has noticed a nasal quality to his voice, although his breathing remains unobstructed. He has been using NeilMed Sinus Rinse at night, which provides significant relief. He has been taking Allegra since 2024, after Zyrtec proved ineffective. He occasionally uses Bactroban for his nose. He has not experienced any fevers but reports feeling unwell. He has previously used Flonase, which resulted in nosebleeds after a few days of use. Astelin was prescribed as an alternative but caused grogginess. He was also prescribed Nasacort but did not use it due to concerns about similar side effects to Flonase. He uses an air purifier in his room due to dustiness. He has been taking Culturelle and budesonide, which  on 2025.    He reports a significant improvement in his hemorrhoids compared to 2024 and 2024. He was informed that his hemorrhoids were not severe enough to warrant surgery. He was scheduled for a colonoscopy in 2025 but decided against it due to anxiety about the preparation process. He reports no blood in his stool. In 2025, he was prescribed 25 mg

## 2025-06-12 ENCOUNTER — RESULTS FOLLOW-UP (OUTPATIENT)
Dept: INTERNAL MEDICINE CLINIC | Facility: CLINIC | Age: 65
End: 2025-06-12

## 2025-06-12 ENCOUNTER — OFFICE VISIT (OUTPATIENT)
Dept: INTERNAL MEDICINE CLINIC | Facility: CLINIC | Age: 65
End: 2025-06-12

## 2025-06-12 VITALS
SYSTOLIC BLOOD PRESSURE: 120 MMHG | OXYGEN SATURATION: 97 % | TEMPERATURE: 98.1 F | HEART RATE: 74 BPM | WEIGHT: 159.6 LBS | BODY MASS INDEX: 23.64 KG/M2 | HEIGHT: 69 IN | DIASTOLIC BLOOD PRESSURE: 78 MMHG

## 2025-06-12 DIAGNOSIS — Z13.220 SCREENING FOR LIPID DISORDERS: ICD-10-CM

## 2025-06-12 DIAGNOSIS — R09.89 CHRONIC SINUS COMPLAINTS: Chronic | ICD-10-CM

## 2025-06-12 DIAGNOSIS — I48.0 PAROXYSMAL ATRIAL FIBRILLATION (HCC): Primary | Chronic | ICD-10-CM

## 2025-06-12 DIAGNOSIS — Z98.890 S/P MVR (MITRAL VALVE REPAIR): ICD-10-CM

## 2025-06-12 DIAGNOSIS — F32.0 MAJOR DEPRESSIVE DISORDER, SINGLE EPISODE, MILD: ICD-10-CM

## 2025-06-12 DIAGNOSIS — F41.9 ANXIETY: Chronic | ICD-10-CM

## 2025-06-12 DIAGNOSIS — I50.22 CHRONIC SYSTOLIC (CONGESTIVE) HEART FAILURE (HCC): Chronic | ICD-10-CM

## 2025-06-12 DIAGNOSIS — L57.0 ACTINIC KERATOSES: ICD-10-CM

## 2025-06-12 DIAGNOSIS — Z12.5 SCREENING PSA (PROSTATE SPECIFIC ANTIGEN): ICD-10-CM

## 2025-06-12 LAB
ALBUMIN SERPL-MCNC: 4.3 G/DL (ref 3.2–4.6)
ALBUMIN/GLOB SERPL: 1.6 (ref 1–1.9)
ALP SERPL-CCNC: 84 U/L (ref 40–129)
ALT SERPL-CCNC: 40 U/L (ref 8–55)
ANION GAP SERPL CALC-SCNC: 14 MMOL/L (ref 7–16)
AST SERPL-CCNC: 42 U/L (ref 15–37)
BASOPHILS # BLD: 0.08 K/UL (ref 0–0.2)
BASOPHILS NFR BLD: 1.2 % (ref 0–2)
BILIRUB SERPL-MCNC: 0.7 MG/DL (ref 0–1.2)
BUN SERPL-MCNC: 14 MG/DL (ref 8–23)
CALCIUM SERPL-MCNC: 9.7 MG/DL (ref 8.8–10.2)
CHLORIDE SERPL-SCNC: 105 MMOL/L (ref 98–107)
CHOLEST SERPL-MCNC: 158 MG/DL (ref 0–200)
CO2 SERPL-SCNC: 22 MMOL/L (ref 20–29)
CREAT SERPL-MCNC: 0.93 MG/DL (ref 0.8–1.3)
DIFFERENTIAL METHOD BLD: ABNORMAL
EOSINOPHIL # BLD: 0.06 K/UL (ref 0–0.8)
EOSINOPHIL NFR BLD: 0.9 % (ref 0.5–7.8)
ERYTHROCYTE [DISTWIDTH] IN BLOOD BY AUTOMATED COUNT: 12.9 % (ref 11.9–14.6)
GLOBULIN SER CALC-MCNC: 2.7 G/DL (ref 2.3–3.5)
GLUCOSE SERPL-MCNC: 68 MG/DL (ref 70–99)
HCT VFR BLD AUTO: 50.4 % (ref 41.1–50.3)
HDLC SERPL-MCNC: 50 MG/DL (ref 40–60)
HDLC SERPL: 3.1 (ref 0–5)
HGB BLD-MCNC: 17.3 G/DL (ref 13.6–17.2)
IMM GRANULOCYTES # BLD AUTO: 0.02 K/UL (ref 0–0.5)
IMM GRANULOCYTES NFR BLD AUTO: 0.3 % (ref 0–5)
LDLC SERPL CALC-MCNC: 91 MG/DL (ref 0–100)
LYMPHOCYTES # BLD: 1.6 K/UL (ref 0.5–4.6)
LYMPHOCYTES NFR BLD: 23.1 % (ref 13–44)
MCH RBC QN AUTO: 30.6 PG (ref 26.1–32.9)
MCHC RBC AUTO-ENTMCNC: 34.3 G/DL (ref 31.4–35)
MCV RBC AUTO: 89 FL (ref 82–102)
MONOCYTES # BLD: 0.49 K/UL (ref 0.1–1.3)
MONOCYTES NFR BLD: 7.1 % (ref 4–12)
NEUTS SEG # BLD: 4.68 K/UL (ref 1.7–8.2)
NEUTS SEG NFR BLD: 67.4 % (ref 43–78)
NRBC # BLD: 0 K/UL (ref 0–0.2)
PLATELET # BLD AUTO: 240 K/UL (ref 150–450)
PMV BLD AUTO: 9.5 FL (ref 9.4–12.3)
POTASSIUM SERPL-SCNC: 5.4 MMOL/L (ref 3.5–5.1)
PROT SERPL-MCNC: 6.9 G/DL (ref 6.3–8.2)
PSA SERPL-MCNC: 3.4 NG/ML (ref 0–4)
RBC # BLD AUTO: 5.66 M/UL (ref 4.23–5.6)
SODIUM SERPL-SCNC: 140 MMOL/L (ref 136–145)
TRIGL SERPL-MCNC: 84 MG/DL (ref 0–150)
TSH, 3RD GENERATION: 2.99 UIU/ML (ref 0.27–4.2)
VLDLC SERPL CALC-MCNC: 17 MG/DL (ref 6–23)
WBC # BLD AUTO: 6.9 K/UL (ref 4.3–11.1)

## 2025-06-12 RX ORDER — AMOXICILLIN 500 MG/1
500 CAPSULE ORAL 3 TIMES DAILY PRN
COMMUNITY

## 2025-06-12 NOTE — ASSESSMENT & PLAN NOTE
Rate controlled on metoprolol and anticoagulated on Eliquis.  Labs today - will contact with results.    Orders:    Comprehensive Metabolic Panel    CBC with Auto Differential    TSH    COLLECTION VENOUS BLOOD,VENIPUNCTURE

## 2025-06-12 NOTE — PROGRESS NOTES
antigen)     Labs today - will contact with results.    Orders:    PSA Screening    COLLECTION VENOUS BLOOD,VENIPUNCTURE    Screening for lipid disorders     Labs today - will contact with results.    Orders:    Lipid Panel    COLLECTION VENOUS BLOOD,VENIPUNCTURE      Return in about 6 months (around 12/12/2025).    The patient (or guardian, if applicable) and other individuals in attendance with the patient were advised that Artificial Intelligence will be utilized during this visit to record, process the conversation to generate a clinical note, and support improvement of the AI technology. The patient (or guardian, if applicable) and other individuals in attendance at the appointment consented to the use of AI, including the recording.    Julia S Paduano, APRN - CNP

## 2025-06-25 RX ORDER — APIXABAN 5 MG/1
TABLET, FILM COATED ORAL
Qty: 180 TABLET | Refills: 3 | Status: SHIPPED | OUTPATIENT
Start: 2025-06-25

## 2025-07-01 ENCOUNTER — CLINICAL SUPPORT (OUTPATIENT)
Dept: INTERNAL MEDICINE CLINIC | Facility: CLINIC | Age: 65
End: 2025-07-01
Payer: MEDICARE

## 2025-07-01 DIAGNOSIS — E87.5 HYPERKALEMIA: ICD-10-CM

## 2025-07-01 DIAGNOSIS — I48.0 PAROXYSMAL ATRIAL FIBRILLATION (HCC): Primary | ICD-10-CM

## 2025-07-01 LAB
ALBUMIN/GLOB SERPL: 1.6 (ref 1–1.9)
ALP SERPL-CCNC: 71 U/L (ref 40–129)
ALT SERPL-CCNC: 33 U/L (ref 8–55)
ANION GAP SERPL CALC-SCNC: 13 MMOL/L (ref 7–16)
AST SERPL-CCNC: 40 U/L (ref 15–37)
BASOPHILS # BLD: 0.08 K/UL (ref 0–0.2)
BASOPHILS NFR BLD: 1.7 % (ref 0–2)
BUN SERPL-MCNC: 14 MG/DL (ref 8–23)
CALCIUM SERPL-MCNC: 9.5 MG/DL (ref 8.8–10.2)
CO2 SERPL-SCNC: 21 MMOL/L (ref 20–29)
CREAT SERPL-MCNC: 0.92 MG/DL (ref 0.8–1.3)
DIFFERENTIAL METHOD BLD: NORMAL
EOSINOPHIL # BLD: 0.04 K/UL (ref 0–0.8)
ERYTHROCYTE [DISTWIDTH] IN BLOOD BY AUTOMATED COUNT: 13.2 % (ref 11.9–14.6)
GLOBULIN SER CALC-MCNC: 2.6 G/DL (ref 2.3–3.5)
GLUCOSE SERPL-MCNC: 92 MG/DL (ref 70–99)
HCT VFR BLD AUTO: 47.9 % (ref 41.1–50.3)
HGB BLD-MCNC: 16.5 G/DL (ref 13.6–17.2)
IMM GRANULOCYTES # BLD AUTO: 0.01 K/UL (ref 0–0.5)
IMM GRANULOCYTES NFR BLD AUTO: 0.2 % (ref 0–5)
LYMPHOCYTES # BLD: 1.42 K/UL (ref 0.5–4.6)
LYMPHOCYTES NFR BLD: 29.4 % (ref 13–44)
MCH RBC QN AUTO: 31.3 PG (ref 26.1–32.9)
MCHC RBC AUTO-ENTMCNC: 34.4 G/DL (ref 31.4–35)
MCV RBC AUTO: 90.7 FL (ref 82–102)
MONOCYTES # BLD: 0.47 K/UL (ref 0.1–1.3)
MONOCYTES NFR BLD: 9.7 % (ref 4–12)
NEUTS SEG # BLD: 2.81 K/UL (ref 1.7–8.2)
NEUTS SEG NFR BLD: 58.2 % (ref 43–78)
NRBC # BLD: 0 K/UL (ref 0–0.2)
PLATELET # BLD AUTO: 221 K/UL (ref 150–450)
POTASSIUM SERPL-SCNC: 4.2 MMOL/L (ref 3.5–5.1)
PROT SERPL-MCNC: 6.9 G/DL (ref 6.3–8.2)
SODIUM SERPL-SCNC: 134 MMOL/L (ref 136–145)
WBC # BLD AUTO: 4.8 K/UL (ref 4.3–11.1)

## 2025-07-01 PROCEDURE — 36415 COLL VENOUS BLD VENIPUNCTURE: CPT | Performed by: NURSE PRACTITIONER

## 2025-07-01 NOTE — PROGRESS NOTES
Labs drawn on 1st attempt from left hand with 22G needle. Dressing applied.    Julia S Paduano, APRN - CNP

## 2025-07-02 ENCOUNTER — RESULTS FOLLOW-UP (OUTPATIENT)
Dept: INTERNAL MEDICINE CLINIC | Facility: CLINIC | Age: 65
End: 2025-07-02

## 2025-07-24 ENCOUNTER — PATIENT MESSAGE (OUTPATIENT)
Dept: INTERNAL MEDICINE CLINIC | Facility: CLINIC | Age: 65
End: 2025-07-24

## 2025-07-25 RX ORDER — AMOXICILLIN 500 MG/1
CAPSULE ORAL
Qty: 4 CAPSULE | Refills: 0 | Status: SHIPPED | OUTPATIENT
Start: 2025-07-25

## 2025-07-25 NOTE — TELEPHONE ENCOUNTER
Requested Prescriptions     Signed Prescriptions Disp Refills    amoxicillin (AMOXIL) 500 MG capsule 4 capsule 0     Sig: Take 4 tablets (30-60 min) prior to dental appointment     Authorizing Provider: PADUANO, JULIA S

## (undated) DEVICE — SUTURE COAT VCRL SZ 0 L36IN ABSRB VLT CTX L48MM TAPERPOINT J370H

## (undated) DEVICE — WAX SURG 2.5GM HEMSTAT BNE BEESWAX PARAFFIN ISO PALMITATE

## (undated) DEVICE — COR-KNOT® QUICK LOAD® 6-POUCH: Brand: COR-KNOT® QUICK LOAD®

## (undated) DEVICE — DRAIN SURG L3/8-1/2IN DIA3/16IN SIL CARD CONN 1:1 BLAK

## (undated) DEVICE — SUT PROL 4-0 36IN RB1 DA BLU --

## (undated) DEVICE — 3000CC GUARDIAN II: Brand: GUARDIAN

## (undated) DEVICE — INTENDED TO BE USED TO OCCLUDE, RETRACT AND IDENTIFY ARTERIES, VEINS, TENDONS AND NERVES IN SURGICAL PROCEDURES: Brand: STERION®  VESSEL LOOP

## (undated) DEVICE — CATHETER THOR 32FR L23IN PVC 6 EYELET STR ATRAUM

## (undated) DEVICE — SUTURE SILK PERMAHAND PRECUT 6 X 30 IN SZ 1 BLK BRAID A307H

## (undated) DEVICE — Device

## (undated) DEVICE — CATH RECT FUN END OPN TIP 22FR --

## (undated) DEVICE — BUTTON SWITCH PENCIL BLADE ELECTRODE, HOLSTER: Brand: EDGE

## (undated) DEVICE — SUTURE PERMAHAND SZ 0 L30IN NONABSORBABLE BLK L30MM PSL 3/8 590H

## (undated) DEVICE — LEAD PCMKR MYOCARDL BPLR TEMP. --

## (undated) DEVICE — REM POLYHESIVE ADULT PATIENT RETURN ELECTRODE: Brand: VALLEYLAB

## (undated) DEVICE — SUTURE ETHBND EXCEL SZ 0 L18IN NONABSORBABLE GRN L36MM CT-1 CX21D

## (undated) DEVICE — SUTURE S STL SZ 5 L18IN NONABSORBABLE SIL CCS L48MM 1/2 CIR M653G

## (undated) DEVICE — KIT INFUS PMP 270ML 2M/HR SOAK CATH L2.5IN N NARC ON-Q

## (undated) DEVICE — (D)STRIP SKN CLSR 0.5X4IN WHT --

## (undated) DEVICE — SUT ETHBND 5-0 36IN RB1 DA GRN --

## (undated) DEVICE — SUT PROL 3-0 36IN MH DA BLU --

## (undated) DEVICE — SOLUTION IRRIG 1000ML LAC RINGER PLAS POUR BTL

## (undated) DEVICE — PLEDGET VASC W3/16XL3/8IN THK1/16IN PTFE SFT

## (undated) DEVICE — CATH URETH INTMIT ROB 14FR FUN -- USE ITEM 179521

## (undated) DEVICE — 6 FOOT DISPOSABLE EXTENSION CABLE WITH SAFE CONNECT / SCREW-DOWN

## (undated) DEVICE — CLAMP INSERT: Brand: STEALTH® CLAMP INSERT

## (undated) DEVICE — BASIC SINGLE BASIN-LF: Brand: MEDLINE INDUSTRIES, INC.

## (undated) DEVICE — 48" PROBE COVER W/GEL, ULTRASOUND, STERILE: Brand: SITE-RITE

## (undated) DEVICE — (D)GLOVE SURG 8 PWD LTX -- DISC BY MFR USE ITEM 110052

## (undated) DEVICE — PROBE CRYOABLATION L10CM ALUM SMOOTH MAL CRYOICE

## (undated) DEVICE — MEDI-TRACE CADENCE ADULT, DEFIBRILLATION ELECTRODE -RTS  (10 PR/PK) - ZOLL: Brand: MEDI-TRACE CADENCE

## (undated) DEVICE — TROCAR SITE CLOSURE DEVICE: Brand: ENDO CLOSE

## (undated) DEVICE — Z DISCONTINUED NO SUB IDED DRAIN SURG 2 COLL PT TB FOR ATS BG OASIS

## (undated) DEVICE — 3M™ TEGADERM™ TRANSPARENT FILM DRESSING FRAME STYLE, 1626W, 4 IN X 4-3/4 IN (10 CM X 12 CM), 50/CT 4CT/CASE: Brand: 3M™ TEGADERM™

## (undated) DEVICE — SUTURE VCRL SZ 3-0 L27IN ABSRB UD L24MM PS-1 3/8 CIR PRIM J936H

## (undated) DEVICE — BLADE ELECTRODE: Brand: EDGE

## (undated) DEVICE — CATH SUC CTRL PRT TRIFLO 14FR --

## (undated) DEVICE — SUTURE ETHBND EXCEL SZ 3-0 L36IN NONABSORBABLE GRN RB-1 X558H

## (undated) DEVICE — INTENT TO BE USED WITH SUTURE MATERIAL FOR TISSUE CLOSURE: Brand: RICHARD-ALLAN® NEEDLE 1/2 CIRCLE TAPER

## (undated) DEVICE — JP CHANNEL DRAIN, 19FR HUBLESS: Brand: CARDINAL HEALTH

## (undated) DEVICE — SUT PROL 3-0 36IN SH DA BLU --

## (undated) DEVICE — WOUND RETRACTOR AND PROTECTOR: Brand: ALEXIS O WOUND PROTECTOR-RETRACTOR

## (undated) DEVICE — SUTURE ETHBND EXCEL 2-0 L30IN NONABSORBABLE GRN L26MM SH MX563

## (undated) DEVICE — TAPE UMB 1/8X30IN MP COT WHT --

## (undated) DEVICE — SOLUTION IV 1000ML 0.9% SOD CHL

## (undated) DEVICE — MEDI-VAC YANKAUER SUCTION HANDLE W/BULBOUS TIP: Brand: CARDINAL HEALTH

## (undated) DEVICE — SOLUTION IRRIG 3000ML 0.9% SOD CHL FLX CONT 0797208] ICU MEDICAL INC]

## (undated) DEVICE — DRAIN CHEST ADL/PED DRY/WET -- PLEUR-EVAC

## (undated) DEVICE — INTENDED USED TO PROTECT, TAG AND HELP LOCATED SUTURES DURING SURGERY: Brand: STERION®SUTURE AID BOOTIES

## (undated) DEVICE — SUTURE ETHBND 1 L30IN NONABSORBABLE GRN L70MM XLH 1/2 CIR X190G

## (undated) DEVICE — AMD ANTIMICROBIAL NON-ADHERENT PAD,0.2% POLYHEXAMETHYLENE BIGUANIDE HCI (PHMB): Brand: TELFA

## (undated) DEVICE — SUTURE ABSRB 27IN SZ 4-0 17MM RB-1 1/2 CIR TAPR PNT MFIL U207H

## (undated) DEVICE — AMD ANTIMICROBIAL GAUZE SPONGES,12 PLY USP TYPE VII, 0.2% POLYHEXAMETHYLENE BIGUANIDE HCI (PHMB): Brand: CURITY

## (undated) DEVICE — SUTURE NONABSORBABLE BRAID SILK BLK RB-1 3-0 24IN K572H

## (undated) DEVICE — CATHETER URETH 18FR RED RUB INTMIT ALL PURP

## (undated) DEVICE — DRAPE SLUSH DISC W44XL66IN ST FOR RND BSIN HUSH SLUSH SYS

## (undated) DEVICE — TRAY FOLEY 16FR TEMP SENS F400 --

## (undated) DEVICE — (D)PREP SKN CHLRAPRP APPL 26ML -- CONVERT TO ITEM 371833

## (undated) DEVICE — BLADE SAW W10XL54MM FOR PRI REPEAT STRNOTMY